# Patient Record
Sex: FEMALE | Race: WHITE | NOT HISPANIC OR LATINO | Employment: FULL TIME | URBAN - METROPOLITAN AREA
[De-identification: names, ages, dates, MRNs, and addresses within clinical notes are randomized per-mention and may not be internally consistent; named-entity substitution may affect disease eponyms.]

---

## 2017-01-03 ENCOUNTER — GENERIC CONVERSION - ENCOUNTER (OUTPATIENT)
Dept: OTHER | Facility: OTHER | Age: 32
End: 2017-01-03

## 2017-01-06 ENCOUNTER — ALLSCRIPTS OFFICE VISIT (OUTPATIENT)
Dept: OTHER | Facility: OTHER | Age: 32
End: 2017-01-06

## 2017-01-06 ENCOUNTER — GENERIC CONVERSION - ENCOUNTER (OUTPATIENT)
Dept: OTHER | Facility: OTHER | Age: 32
End: 2017-01-06

## 2017-01-06 LAB
GLUCOSE (HISTORICAL): NEGATIVE
PROT UR STRIP-MCNC: NEGATIVE MG/DL

## 2017-01-10 ENCOUNTER — GENERIC CONVERSION - ENCOUNTER (OUTPATIENT)
Dept: OTHER | Facility: OTHER | Age: 32
End: 2017-01-10

## 2017-01-12 ENCOUNTER — GENERIC CONVERSION - ENCOUNTER (OUTPATIENT)
Dept: OTHER | Facility: OTHER | Age: 32
End: 2017-01-12

## 2017-01-13 ENCOUNTER — GENERIC CONVERSION - ENCOUNTER (OUTPATIENT)
Dept: OTHER | Facility: OTHER | Age: 32
End: 2017-01-13

## 2017-01-14 ENCOUNTER — TRANSCRIBE ORDERS (OUTPATIENT)
Dept: LAB | Facility: CLINIC | Age: 32
End: 2017-01-14

## 2017-01-14 ENCOUNTER — APPOINTMENT (OUTPATIENT)
Dept: LAB | Facility: CLINIC | Age: 32
End: 2017-01-14
Payer: COMMERCIAL

## 2017-01-14 DIAGNOSIS — Z34.90 PREGNANCY, UNSPECIFIED GESTATIONAL AGE: ICD-10-CM

## 2017-01-14 DIAGNOSIS — Z34.90 PREGNANCY, UNSPECIFIED GESTATIONAL AGE: Primary | ICD-10-CM

## 2017-01-14 PROCEDURE — 36415 COLL VENOUS BLD VENIPUNCTURE: CPT

## 2017-01-15 LAB — SCAN RESULT: NORMAL

## 2017-01-17 ENCOUNTER — TRANSCRIBE ORDERS (OUTPATIENT)
Dept: URGENT CARE | Facility: MEDICAL CENTER | Age: 32
End: 2017-01-17

## 2017-01-17 ENCOUNTER — APPOINTMENT (OUTPATIENT)
Dept: URGENT CARE | Facility: MEDICAL CENTER | Age: 32
End: 2017-01-17
Payer: COMMERCIAL

## 2017-01-17 ENCOUNTER — OFFICE VISIT (OUTPATIENT)
Dept: URGENT CARE | Facility: MEDICAL CENTER | Age: 32
End: 2017-01-17
Payer: COMMERCIAL

## 2017-01-17 ENCOUNTER — APPOINTMENT (OUTPATIENT)
Dept: LAB | Facility: MEDICAL CENTER | Age: 32
End: 2017-01-17
Payer: COMMERCIAL

## 2017-01-17 ENCOUNTER — GENERIC CONVERSION - ENCOUNTER (OUTPATIENT)
Dept: OTHER | Facility: OTHER | Age: 32
End: 2017-01-17

## 2017-01-17 DIAGNOSIS — J02.9 SORE THROAT: ICD-10-CM

## 2017-01-17 DIAGNOSIS — J02.9 SORE THROAT: Primary | ICD-10-CM

## 2017-01-17 DIAGNOSIS — J02.9 ACUTE PHARYNGITIS: ICD-10-CM

## 2017-01-17 PROCEDURE — 99213 OFFICE O/P EST LOW 20 MIN: CPT

## 2017-01-17 PROCEDURE — 87070 CULTURE OTHR SPECIMN AEROBIC: CPT

## 2017-01-19 ENCOUNTER — APPOINTMENT (OUTPATIENT)
Dept: PERINATAL CARE | Facility: CLINIC | Age: 32
End: 2017-01-19
Payer: COMMERCIAL

## 2017-01-19 ENCOUNTER — GENERIC CONVERSION - ENCOUNTER (OUTPATIENT)
Dept: OTHER | Facility: OTHER | Age: 32
End: 2017-01-19

## 2017-01-19 LAB — BACTERIA THROAT CULT: NORMAL

## 2017-01-19 PROCEDURE — 96372 THER/PROPH/DIAG INJ SC/IM: CPT | Performed by: OBSTETRICS & GYNECOLOGY

## 2017-01-25 ENCOUNTER — GENERIC CONVERSION - ENCOUNTER (OUTPATIENT)
Dept: OTHER | Facility: OTHER | Age: 32
End: 2017-01-25

## 2017-01-26 ENCOUNTER — ALLSCRIPTS OFFICE VISIT (OUTPATIENT)
Dept: PERINATAL CARE | Facility: CLINIC | Age: 32
End: 2017-01-26
Payer: COMMERCIAL

## 2017-01-26 PROCEDURE — 96372 THER/PROPH/DIAG INJ SC/IM: CPT | Performed by: OBSTETRICS & GYNECOLOGY

## 2017-02-02 ENCOUNTER — APPOINTMENT (OUTPATIENT)
Dept: PERINATAL CARE | Facility: CLINIC | Age: 32
End: 2017-02-02
Payer: COMMERCIAL

## 2017-02-02 ENCOUNTER — GENERIC CONVERSION - ENCOUNTER (OUTPATIENT)
Dept: OTHER | Facility: OTHER | Age: 32
End: 2017-02-02

## 2017-02-02 PROCEDURE — 96372 THER/PROPH/DIAG INJ SC/IM: CPT | Performed by: OBSTETRICS & GYNECOLOGY

## 2017-02-03 ENCOUNTER — ALLSCRIPTS OFFICE VISIT (OUTPATIENT)
Dept: PERINATAL CARE | Facility: MEDICAL CENTER | Age: 32
End: 2017-02-03
Payer: COMMERCIAL

## 2017-02-03 ENCOUNTER — GENERIC CONVERSION - ENCOUNTER (OUTPATIENT)
Dept: OTHER | Facility: OTHER | Age: 32
End: 2017-02-03

## 2017-02-03 PROCEDURE — 76811 OB US DETAILED SNGL FETUS: CPT | Performed by: OBSTETRICS & GYNECOLOGY

## 2017-02-03 PROCEDURE — 76817 TRANSVAGINAL US OBSTETRIC: CPT | Performed by: OBSTETRICS & GYNECOLOGY

## 2017-02-06 ENCOUNTER — ALLSCRIPTS OFFICE VISIT (OUTPATIENT)
Dept: OTHER | Facility: OTHER | Age: 32
End: 2017-02-06

## 2017-02-06 LAB
GLUCOSE (HISTORICAL): NEGATIVE
PROT UR STRIP-MCNC: NEGATIVE MG/DL

## 2017-02-09 ENCOUNTER — ALLSCRIPTS OFFICE VISIT (OUTPATIENT)
Dept: PERINATAL CARE | Facility: CLINIC | Age: 32
End: 2017-02-09
Payer: COMMERCIAL

## 2017-02-09 PROCEDURE — 96372 THER/PROPH/DIAG INJ SC/IM: CPT | Performed by: OBSTETRICS & GYNECOLOGY

## 2017-02-16 ENCOUNTER — ALLSCRIPTS OFFICE VISIT (OUTPATIENT)
Dept: PERINATAL CARE | Facility: CLINIC | Age: 32
End: 2017-02-16
Payer: COMMERCIAL

## 2017-02-16 ENCOUNTER — GENERIC CONVERSION - ENCOUNTER (OUTPATIENT)
Dept: OTHER | Facility: OTHER | Age: 32
End: 2017-02-16

## 2017-02-16 PROCEDURE — 76815 OB US LIMITED FETUS(S): CPT | Performed by: OBSTETRICS & GYNECOLOGY

## 2017-02-16 PROCEDURE — 96372 THER/PROPH/DIAG INJ SC/IM: CPT | Performed by: OBSTETRICS & GYNECOLOGY

## 2017-02-16 PROCEDURE — 76816 OB US FOLLOW-UP PER FETUS: CPT | Performed by: OBSTETRICS & GYNECOLOGY

## 2017-02-23 ENCOUNTER — GENERIC CONVERSION - ENCOUNTER (OUTPATIENT)
Dept: OTHER | Facility: OTHER | Age: 32
End: 2017-02-23

## 2017-02-23 ENCOUNTER — APPOINTMENT (OUTPATIENT)
Dept: PERINATAL CARE | Facility: CLINIC | Age: 32
End: 2017-02-23
Payer: COMMERCIAL

## 2017-02-23 PROCEDURE — 96372 THER/PROPH/DIAG INJ SC/IM: CPT | Performed by: OBSTETRICS & GYNECOLOGY

## 2017-03-02 ENCOUNTER — GENERIC CONVERSION - ENCOUNTER (OUTPATIENT)
Dept: OTHER | Facility: OTHER | Age: 32
End: 2017-03-02

## 2017-03-02 ENCOUNTER — ALLSCRIPTS OFFICE VISIT (OUTPATIENT)
Dept: PERINATAL CARE | Facility: CLINIC | Age: 32
End: 2017-03-02
Payer: COMMERCIAL

## 2017-03-02 PROCEDURE — 76817 TRANSVAGINAL US OBSTETRIC: CPT | Performed by: OBSTETRICS & GYNECOLOGY

## 2017-03-08 ENCOUNTER — GENERIC CONVERSION - ENCOUNTER (OUTPATIENT)
Dept: OTHER | Facility: OTHER | Age: 32
End: 2017-03-08

## 2017-03-09 ENCOUNTER — GENERIC CONVERSION - ENCOUNTER (OUTPATIENT)
Dept: OTHER | Facility: OTHER | Age: 32
End: 2017-03-09

## 2017-03-09 ENCOUNTER — APPOINTMENT (OUTPATIENT)
Dept: PERINATAL CARE | Facility: CLINIC | Age: 32
End: 2017-03-09
Payer: COMMERCIAL

## 2017-03-09 PROCEDURE — 96372 THER/PROPH/DIAG INJ SC/IM: CPT | Performed by: OBSTETRICS & GYNECOLOGY

## 2017-03-15 ENCOUNTER — GENERIC CONVERSION - ENCOUNTER (OUTPATIENT)
Dept: OTHER | Facility: OTHER | Age: 32
End: 2017-03-15

## 2017-03-16 ENCOUNTER — ALLSCRIPTS OFFICE VISIT (OUTPATIENT)
Dept: PERINATAL CARE | Facility: CLINIC | Age: 32
End: 2017-03-16
Payer: COMMERCIAL

## 2017-03-16 PROCEDURE — 96372 THER/PROPH/DIAG INJ SC/IM: CPT | Performed by: OBSTETRICS & GYNECOLOGY

## 2017-03-23 ENCOUNTER — ALLSCRIPTS OFFICE VISIT (OUTPATIENT)
Dept: PERINATAL CARE | Facility: CLINIC | Age: 32
End: 2017-03-23
Payer: COMMERCIAL

## 2017-03-23 PROCEDURE — 96372 THER/PROPH/DIAG INJ SC/IM: CPT | Performed by: OBSTETRICS & GYNECOLOGY

## 2017-03-30 ENCOUNTER — GENERIC CONVERSION - ENCOUNTER (OUTPATIENT)
Dept: OTHER | Facility: OTHER | Age: 32
End: 2017-03-30

## 2017-03-30 ENCOUNTER — ALLSCRIPTS OFFICE VISIT (OUTPATIENT)
Dept: PERINATAL CARE | Facility: CLINIC | Age: 32
End: 2017-03-30
Payer: COMMERCIAL

## 2017-03-30 PROCEDURE — 76816 OB US FOLLOW-UP PER FETUS: CPT | Performed by: OBSTETRICS & GYNECOLOGY

## 2017-04-05 ENCOUNTER — GENERIC CONVERSION - ENCOUNTER (OUTPATIENT)
Dept: OTHER | Facility: OTHER | Age: 32
End: 2017-04-05

## 2017-04-05 DIAGNOSIS — Z34.82 ENCOUNTER FOR SUPERVISION OF OTHER NORMAL PREGNANCY, SECOND TRIMESTER: ICD-10-CM

## 2017-04-06 ENCOUNTER — APPOINTMENT (OUTPATIENT)
Dept: PERINATAL CARE | Facility: CLINIC | Age: 32
End: 2017-04-06
Payer: COMMERCIAL

## 2017-04-06 ENCOUNTER — GENERIC CONVERSION - ENCOUNTER (OUTPATIENT)
Dept: OTHER | Facility: OTHER | Age: 32
End: 2017-04-06

## 2017-04-06 PROCEDURE — 96372 THER/PROPH/DIAG INJ SC/IM: CPT | Performed by: OBSTETRICS & GYNECOLOGY

## 2017-04-08 ENCOUNTER — APPOINTMENT (OUTPATIENT)
Dept: LAB | Facility: CLINIC | Age: 32
End: 2017-04-08
Payer: COMMERCIAL

## 2017-04-08 ENCOUNTER — TRANSCRIBE ORDERS (OUTPATIENT)
Dept: LAB | Facility: CLINIC | Age: 32
End: 2017-04-08

## 2017-04-08 DIAGNOSIS — Z34.82 ENCOUNTER FOR SUPERVISION OF OTHER NORMAL PREGNANCY, SECOND TRIMESTER: ICD-10-CM

## 2017-04-08 LAB
BASOPHILS # BLD AUTO: 0.02 THOUSANDS/ΜL (ref 0–0.1)
BASOPHILS NFR BLD AUTO: 0 % (ref 0–1)
EOSINOPHIL # BLD AUTO: 1.09 THOUSAND/ΜL (ref 0–0.61)
EOSINOPHIL NFR BLD AUTO: 9 % (ref 0–6)
ERYTHROCYTE [DISTWIDTH] IN BLOOD BY AUTOMATED COUNT: 12.4 % (ref 11.6–15.1)
GLUCOSE 1H P 50 G GLC PO SERPL-MCNC: 98 MG/DL
HCT VFR BLD AUTO: 34.6 % (ref 34.8–46.1)
HGB BLD-MCNC: 11.8 G/DL (ref 11.5–15.4)
LYMPHOCYTES # BLD AUTO: 2.01 THOUSANDS/ΜL (ref 0.6–4.47)
LYMPHOCYTES NFR BLD AUTO: 17 % (ref 14–44)
MCH RBC QN AUTO: 29.9 PG (ref 26.8–34.3)
MCHC RBC AUTO-ENTMCNC: 34.1 G/DL (ref 31.4–37.4)
MCV RBC AUTO: 88 FL (ref 82–98)
MONOCYTES # BLD AUTO: 0.59 THOUSAND/ΜL (ref 0.17–1.22)
MONOCYTES NFR BLD AUTO: 5 % (ref 4–12)
NEUTROPHILS # BLD AUTO: 8.21 THOUSANDS/ΜL (ref 1.85–7.62)
NEUTS SEG NFR BLD AUTO: 69 % (ref 43–75)
PLATELET # BLD AUTO: 214 THOUSANDS/UL (ref 149–390)
PMV BLD AUTO: 10 FL (ref 8.9–12.7)
RBC # BLD AUTO: 3.95 MILLION/UL (ref 3.81–5.12)
WBC # BLD AUTO: 11.92 THOUSAND/UL (ref 4.31–10.16)

## 2017-04-08 PROCEDURE — 85025 COMPLETE CBC W/AUTO DIFF WBC: CPT

## 2017-04-08 PROCEDURE — 36415 COLL VENOUS BLD VENIPUNCTURE: CPT

## 2017-04-08 PROCEDURE — 82950 GLUCOSE TEST: CPT

## 2017-04-13 ENCOUNTER — APPOINTMENT (OUTPATIENT)
Dept: PERINATAL CARE | Facility: CLINIC | Age: 32
End: 2017-04-13
Payer: COMMERCIAL

## 2017-04-13 ENCOUNTER — GENERIC CONVERSION - ENCOUNTER (OUTPATIENT)
Dept: OTHER | Facility: OTHER | Age: 32
End: 2017-04-13

## 2017-04-13 PROCEDURE — 96372 THER/PROPH/DIAG INJ SC/IM: CPT | Performed by: OBSTETRICS & GYNECOLOGY

## 2017-04-18 ENCOUNTER — ALLSCRIPTS OFFICE VISIT (OUTPATIENT)
Dept: OTHER | Facility: OTHER | Age: 32
End: 2017-04-18

## 2017-04-18 ENCOUNTER — GENERIC CONVERSION - ENCOUNTER (OUTPATIENT)
Dept: OTHER | Facility: OTHER | Age: 32
End: 2017-04-18

## 2017-04-20 ENCOUNTER — ALLSCRIPTS OFFICE VISIT (OUTPATIENT)
Dept: PERINATAL CARE | Facility: CLINIC | Age: 32
End: 2017-04-20
Payer: COMMERCIAL

## 2017-04-20 PROCEDURE — 96372 THER/PROPH/DIAG INJ SC/IM: CPT | Performed by: OBSTETRICS & GYNECOLOGY

## 2017-04-27 ENCOUNTER — ALLSCRIPTS OFFICE VISIT (OUTPATIENT)
Dept: PERINATAL CARE | Facility: CLINIC | Age: 32
End: 2017-04-27
Payer: COMMERCIAL

## 2017-04-27 PROCEDURE — 96372 THER/PROPH/DIAG INJ SC/IM: CPT | Performed by: OBSTETRICS & GYNECOLOGY

## 2017-04-28 ENCOUNTER — GENERIC CONVERSION - ENCOUNTER (OUTPATIENT)
Dept: OBGYN CLINIC | Facility: CLINIC | Age: 32
End: 2017-04-28

## 2017-05-05 ENCOUNTER — GENERIC CONVERSION - ENCOUNTER (OUTPATIENT)
Dept: OTHER | Facility: OTHER | Age: 32
End: 2017-05-05

## 2017-05-05 ENCOUNTER — ALLSCRIPTS OFFICE VISIT (OUTPATIENT)
Dept: PERINATAL CARE | Facility: CLINIC | Age: 32
End: 2017-05-05
Payer: COMMERCIAL

## 2017-05-05 PROCEDURE — 96372 THER/PROPH/DIAG INJ SC/IM: CPT | Performed by: OBSTETRICS & GYNECOLOGY

## 2017-05-05 PROCEDURE — 76816 OB US FOLLOW-UP PER FETUS: CPT | Performed by: OBSTETRICS & GYNECOLOGY

## 2017-05-11 ENCOUNTER — APPOINTMENT (OUTPATIENT)
Dept: PERINATAL CARE | Facility: CLINIC | Age: 32
End: 2017-05-11
Payer: COMMERCIAL

## 2017-05-11 ENCOUNTER — GENERIC CONVERSION - ENCOUNTER (OUTPATIENT)
Dept: OTHER | Facility: OTHER | Age: 32
End: 2017-05-11

## 2017-05-11 PROCEDURE — 96372 THER/PROPH/DIAG INJ SC/IM: CPT | Performed by: OBSTETRICS & GYNECOLOGY

## 2017-05-12 ENCOUNTER — GENERIC CONVERSION - ENCOUNTER (OUTPATIENT)
Dept: OTHER | Facility: OTHER | Age: 32
End: 2017-05-12

## 2017-05-18 ENCOUNTER — APPOINTMENT (OUTPATIENT)
Dept: PERINATAL CARE | Facility: CLINIC | Age: 32
End: 2017-05-18
Payer: COMMERCIAL

## 2017-05-18 ENCOUNTER — GENERIC CONVERSION - ENCOUNTER (OUTPATIENT)
Dept: OTHER | Facility: OTHER | Age: 32
End: 2017-05-18

## 2017-05-18 PROCEDURE — 96372 THER/PROPH/DIAG INJ SC/IM: CPT | Performed by: OBSTETRICS & GYNECOLOGY

## 2017-05-25 ENCOUNTER — APPOINTMENT (OUTPATIENT)
Dept: PERINATAL CARE | Facility: CLINIC | Age: 32
End: 2017-05-25
Payer: COMMERCIAL

## 2017-05-25 ENCOUNTER — GENERIC CONVERSION - ENCOUNTER (OUTPATIENT)
Dept: OTHER | Facility: OTHER | Age: 32
End: 2017-05-25

## 2017-05-25 PROCEDURE — 96372 THER/PROPH/DIAG INJ SC/IM: CPT | Performed by: OBSTETRICS & GYNECOLOGY

## 2017-05-26 ENCOUNTER — ALLSCRIPTS OFFICE VISIT (OUTPATIENT)
Dept: OTHER | Facility: OTHER | Age: 32
End: 2017-05-26

## 2017-05-29 LAB — CULT.,B-STREP SENSI PENICILLIN (HISTORICAL): NEGATIVE

## 2017-06-01 ENCOUNTER — GENERIC CONVERSION - ENCOUNTER (OUTPATIENT)
Dept: OTHER | Facility: OTHER | Age: 32
End: 2017-06-01

## 2017-06-01 ENCOUNTER — APPOINTMENT (OUTPATIENT)
Dept: PERINATAL CARE | Facility: CLINIC | Age: 32
End: 2017-06-01
Payer: COMMERCIAL

## 2017-06-01 PROCEDURE — 96372 THER/PROPH/DIAG INJ SC/IM: CPT | Performed by: OBSTETRICS & GYNECOLOGY

## 2017-06-02 ENCOUNTER — GENERIC CONVERSION - ENCOUNTER (OUTPATIENT)
Dept: OTHER | Facility: OTHER | Age: 32
End: 2017-06-02

## 2017-06-09 ENCOUNTER — GENERIC CONVERSION - ENCOUNTER (OUTPATIENT)
Dept: OBGYN CLINIC | Facility: CLINIC | Age: 32
End: 2017-06-09

## 2017-06-16 ENCOUNTER — GENERIC CONVERSION - ENCOUNTER (OUTPATIENT)
Dept: OTHER | Facility: OTHER | Age: 32
End: 2017-06-16

## 2017-06-23 ENCOUNTER — GENERIC CONVERSION - ENCOUNTER (OUTPATIENT)
Dept: OBGYN CLINIC | Facility: CLINIC | Age: 32
End: 2017-06-23

## 2017-06-29 ENCOUNTER — HOSPITAL ENCOUNTER (OUTPATIENT)
Dept: LABOR AND DELIVERY | Facility: HOSPITAL | Age: 32
Discharge: HOME | End: 2017-06-29
Payer: COMMERCIAL

## 2017-06-29 ENCOUNTER — ANESTHESIA EVENT (INPATIENT)
Dept: LABOR AND DELIVERY | Facility: HOSPITAL | Age: 32
End: 2017-06-29
Payer: COMMERCIAL

## 2017-06-29 ENCOUNTER — ANESTHESIA (INPATIENT)
Dept: LABOR AND DELIVERY | Facility: HOSPITAL | Age: 32
End: 2017-06-29
Payer: COMMERCIAL

## 2017-06-29 ENCOUNTER — HOSPITAL ENCOUNTER (INPATIENT)
Facility: HOSPITAL | Age: 32
LOS: 2 days | Discharge: HOME | End: 2017-07-01
Attending: OBSTETRICS & GYNECOLOGY | Admitting: OBSTETRICS & GYNECOLOGY
Payer: COMMERCIAL

## 2017-06-29 DIAGNOSIS — Z3A.40 40 WEEKS GESTATION OF PREGNANCY: ICD-10-CM

## 2017-06-29 PROBLEM — J45.909 ASTHMA: Status: ACTIVE | Noted: 2017-06-29

## 2017-06-29 PROBLEM — F41.9 ANXIETY DISORDER: Status: ACTIVE | Noted: 2017-06-29

## 2017-06-29 LAB
ABO GROUP BLD: NORMAL
BASE EXCESS BLDCOA CALC-SCNC: -3.4 MMOL/L (ref 3–11)
BASE EXCESS BLDCOV CALC-SCNC: -1 MMOL/L (ref 1–9)
BASOPHILS # BLD AUTO: 0.03 THOUSANDS/ΜL (ref 0–0.1)
BASOPHILS NFR BLD AUTO: 0 % (ref 0–1)
BLD GP AB SCN SERPL QL: NEGATIVE
EOSINOPHIL # BLD AUTO: 0.09 THOUSAND/ΜL (ref 0–0.61)
EOSINOPHIL NFR BLD AUTO: 1 % (ref 0–6)
ERYTHROCYTE [DISTWIDTH] IN BLOOD BY AUTOMATED COUNT: 13.3 % (ref 11.6–15.1)
HCO3 BLDCOA-SCNC: 25.8 MMOL/L (ref 17.3–27.3)
HCO3 BLDCOV-SCNC: 24.8 MMOL/L (ref 12.2–28.6)
HCT VFR BLD AUTO: 35.5 % (ref 34.8–46.1)
HGB BLD-MCNC: 11.8 G/DL (ref 11.5–15.4)
LYMPHOCYTES # BLD AUTO: 1.76 THOUSANDS/ΜL (ref 0.6–4.47)
LYMPHOCYTES NFR BLD AUTO: 20 % (ref 14–44)
MCH RBC QN AUTO: 28.2 PG (ref 26.8–34.3)
MCHC RBC AUTO-ENTMCNC: 33.2 G/DL (ref 31.4–37.4)
MCV RBC AUTO: 85 FL (ref 82–98)
MONOCYTES # BLD AUTO: 0.5 THOUSAND/ΜL (ref 0.17–1.22)
MONOCYTES NFR BLD AUTO: 6 % (ref 4–12)
NEUTROPHILS # BLD AUTO: 6.55 THOUSANDS/ΜL (ref 1.85–7.62)
NEUTS SEG NFR BLD AUTO: 73 % (ref 43–75)
NRBC BLD AUTO-RTO: 0 /100 WBCS
O2 CT VFR BLDCOA CALC: 9.5 ML/DL
OXYHGB MFR BLDCOA: 37.5 %
OXYHGB MFR BLDCOV: 52.4 %
PCO2 BLDCOA: 62.5 MM[HG] (ref 30–60)
PCO2 BLDCOV: 44.9 MM HG (ref 27–43)
PH BLDCOA: 7.23 [PH] (ref 7.23–7.43)
PH BLDCOV: 7.36 [PH] (ref 7.19–7.49)
PLATELET # BLD AUTO: 180 THOUSANDS/UL (ref 149–390)
PMV BLD AUTO: 12.6 FL (ref 8.9–12.7)
PO2 BLDCOA: 21.9 MM HG (ref 5–25)
PO2 BLDCOV: 23.6 MM HG (ref 15–45)
RBC # BLD AUTO: 4.19 MILLION/UL (ref 3.81–5.12)
RH BLD: POSITIVE
SAO2 % BLDCOV: 13.1 ML/DL
SPECIMEN EXPIRATION DATE: NORMAL
WBC # BLD AUTO: 8.97 THOUSAND/UL (ref 4.31–10.16)

## 2017-06-29 PROCEDURE — 86900 BLOOD TYPING SEROLOGIC ABO: CPT | Performed by: OBSTETRICS & GYNECOLOGY

## 2017-06-29 PROCEDURE — 82805 BLOOD GASES W/O2 SATURATION: CPT | Performed by: OBSTETRICS & GYNECOLOGY

## 2017-06-29 PROCEDURE — 10D17ZZ EXTRACTION OF PRODUCTS OF CONCEPTION, RETAINED, VIA NATURAL OR ARTIFICIAL OPENING: ICD-10-PCS | Performed by: OBSTETRICS & GYNECOLOGY

## 2017-06-29 PROCEDURE — 86850 RBC ANTIBODY SCREEN: CPT | Performed by: OBSTETRICS & GYNECOLOGY

## 2017-06-29 PROCEDURE — 10907ZC DRAINAGE OF AMNIOTIC FLUID, THERAPEUTIC FROM PRODUCTS OF CONCEPTION, VIA NATURAL OR ARTIFICIAL OPENING: ICD-10-PCS | Performed by: OBSTETRICS & GYNECOLOGY

## 2017-06-29 PROCEDURE — 85025 COMPLETE CBC W/AUTO DIFF WBC: CPT | Performed by: OBSTETRICS & GYNECOLOGY

## 2017-06-29 PROCEDURE — 86901 BLOOD TYPING SEROLOGIC RH(D): CPT | Performed by: OBSTETRICS & GYNECOLOGY

## 2017-06-29 PROCEDURE — 88305 TISSUE EXAM BY PATHOLOGIST: CPT | Performed by: OBSTETRICS & GYNECOLOGY

## 2017-06-29 PROCEDURE — 3E033VJ INTRODUCTION OF OTHER HORMONE INTO PERIPHERAL VEIN, PERCUTANEOUS APPROACH: ICD-10-PCS | Performed by: OBSTETRICS & GYNECOLOGY

## 2017-06-29 PROCEDURE — 86592 SYPHILIS TEST NON-TREP QUAL: CPT | Performed by: OBSTETRICS & GYNECOLOGY

## 2017-06-29 RX ORDER — OXYTOCIN/RINGER'S LACTATE 30/500 ML
1-30 PLASTIC BAG, INJECTION (ML) INTRAVENOUS
Status: DISCONTINUED | OUTPATIENT
Start: 2017-06-29 | End: 2017-07-01 | Stop reason: HOSPADM

## 2017-06-29 RX ORDER — DOCUSATE SODIUM 100 MG/1
100 CAPSULE, LIQUID FILLED ORAL 2 TIMES DAILY
Status: DISCONTINUED | OUTPATIENT
Start: 2017-06-29 | End: 2017-07-01 | Stop reason: HOSPADM

## 2017-06-29 RX ORDER — ACETAMINOPHEN 325 MG/1
650 TABLET ORAL EVERY 6 HOURS PRN
Status: DISCONTINUED | OUTPATIENT
Start: 2017-06-29 | End: 2017-07-01 | Stop reason: HOSPADM

## 2017-06-29 RX ORDER — BISACODYL 10 MG
10 SUPPOSITORY, RECTAL RECTAL AS NEEDED
Status: DISCONTINUED | OUTPATIENT
Start: 2017-06-29 | End: 2017-07-01 | Stop reason: HOSPADM

## 2017-06-29 RX ORDER — LIDOCAINE HYDROCHLORIDE AND EPINEPHRINE 15; 5 MG/ML; UG/ML
INJECTION, SOLUTION EPIDURAL AS NEEDED
Status: DISCONTINUED | OUTPATIENT
Start: 2017-06-29 | End: 2017-06-29 | Stop reason: SURG

## 2017-06-29 RX ORDER — SENNOSIDES 8.6 MG
1 TABLET ORAL DAILY
Status: DISCONTINUED | OUTPATIENT
Start: 2017-06-30 | End: 2017-07-01 | Stop reason: HOSPADM

## 2017-06-29 RX ORDER — DIPHENHYDRAMINE HCL 25 MG
25 TABLET ORAL EVERY 6 HOURS PRN
Status: DISCONTINUED | OUTPATIENT
Start: 2017-06-29 | End: 2017-07-01 | Stop reason: HOSPADM

## 2017-06-29 RX ORDER — SIMETHICONE 80 MG
80 TABLET,CHEWABLE ORAL 4 TIMES DAILY PRN
Status: DISCONTINUED | OUTPATIENT
Start: 2017-06-29 | End: 2017-07-01 | Stop reason: HOSPADM

## 2017-06-29 RX ORDER — METOCLOPRAMIDE HYDROCHLORIDE 5 MG/ML
10 INJECTION INTRAMUSCULAR; INTRAVENOUS EVERY 6 HOURS PRN
Status: DISCONTINUED | OUTPATIENT
Start: 2017-06-29 | End: 2017-07-01 | Stop reason: HOSPADM

## 2017-06-29 RX ORDER — OXYCODONE HYDROCHLORIDE AND ACETAMINOPHEN 5; 325 MG/1; MG/1
1 TABLET ORAL EVERY 4 HOURS PRN
Status: DISCONTINUED | OUTPATIENT
Start: 2017-06-29 | End: 2017-07-01 | Stop reason: HOSPADM

## 2017-06-29 RX ORDER — OXYCODONE HYDROCHLORIDE AND ACETAMINOPHEN 5; 325 MG/1; MG/1
2 TABLET ORAL EVERY 4 HOURS PRN
Status: DISCONTINUED | OUTPATIENT
Start: 2017-06-29 | End: 2017-07-01 | Stop reason: HOSPADM

## 2017-06-29 RX ORDER — DIAPER,BRIEF,INFANT-TODD,DISP
1 EACH MISCELLANEOUS AS NEEDED
Status: DISCONTINUED | OUTPATIENT
Start: 2017-06-29 | End: 2017-07-01 | Stop reason: HOSPADM

## 2017-06-29 RX ORDER — ROPIVACAINE HYDROCHLORIDE 2 MG/ML
INJECTION, SOLUTION EPIDURAL; INFILTRATION; PERINEURAL AS NEEDED
Status: DISCONTINUED | OUTPATIENT
Start: 2017-06-29 | End: 2017-06-29 | Stop reason: SURG

## 2017-06-29 RX ORDER — OXYTOCIN/RINGER'S LACTATE 30/500 ML
250 PLASTIC BAG, INJECTION (ML) INTRAVENOUS CONTINUOUS
Status: DISPENSED | OUTPATIENT
Start: 2017-06-29 | End: 2017-06-29

## 2017-06-29 RX ORDER — SODIUM CHLORIDE, SODIUM LACTATE, POTASSIUM CHLORIDE, CALCIUM CHLORIDE 600; 310; 30; 20 MG/100ML; MG/100ML; MG/100ML; MG/100ML
125 INJECTION, SOLUTION INTRAVENOUS CONTINUOUS
Status: DISCONTINUED | OUTPATIENT
Start: 2017-06-29 | End: 2017-07-01 | Stop reason: HOSPADM

## 2017-06-29 RX ORDER — IBUPROFEN 600 MG/1
600 TABLET ORAL EVERY 6 HOURS PRN
Status: DISCONTINUED | OUTPATIENT
Start: 2017-06-29 | End: 2017-07-01 | Stop reason: HOSPADM

## 2017-06-29 RX ORDER — IBUPROFEN 600 MG/1
TABLET ORAL
Status: COMPLETED
Start: 2017-06-29 | End: 2017-06-29

## 2017-06-29 RX ORDER — OXYTOCIN/RINGER'S LACTATE 30/500 ML
250 PLASTIC BAG, INJECTION (ML) INTRAVENOUS CONTINUOUS
Status: ACTIVE | OUTPATIENT
Start: 2017-06-29 | End: 2017-06-29

## 2017-06-29 RX ORDER — ONDANSETRON 2 MG/ML
4 INJECTION INTRAMUSCULAR; INTRAVENOUS EVERY 6 HOURS PRN
Status: DISCONTINUED | OUTPATIENT
Start: 2017-06-29 | End: 2017-07-01 | Stop reason: HOSPADM

## 2017-06-29 RX ADMIN — SODIUM CHLORIDE, SODIUM LACTATE, POTASSIUM CHLORIDE, AND CALCIUM CHLORIDE 999 ML/HR: .6; .31; .03; .02 INJECTION, SOLUTION INTRAVENOUS at 12:15

## 2017-06-29 RX ADMIN — ROPIVACAINE HYDROCHLORIDE: 2 INJECTION, SOLUTION EPIDURAL; INFILTRATION at 16:05

## 2017-06-29 RX ADMIN — IBUPROFEN 600 MG: 600 TABLET, FILM COATED ORAL at 19:22

## 2017-06-29 RX ADMIN — ROPIVACAINE HYDROCHLORIDE 10 ML: 2 INJECTION, SOLUTION EPIDURAL; INFILTRATION at 16:02

## 2017-06-29 RX ADMIN — ONDANSETRON 4 MG: 2 INJECTION INTRAMUSCULAR; INTRAVENOUS at 16:32

## 2017-06-29 RX ADMIN — LIDOCAINE HYDROCHLORIDE AND EPINEPHRINE 3 ML: 15; 5 INJECTION, SOLUTION EPIDURAL at 15:57

## 2017-06-29 RX ADMIN — SODIUM CHLORIDE, SODIUM LACTATE, POTASSIUM CHLORIDE, AND CALCIUM CHLORIDE 125 ML/HR: .6; .31; .03; .02 INJECTION, SOLUTION INTRAVENOUS at 16:26

## 2017-06-29 RX ADMIN — SODIUM CHLORIDE, SODIUM LACTATE, POTASSIUM CHLORIDE, AND CALCIUM CHLORIDE 125 ML/HR: .6; .31; .03; .02 INJECTION, SOLUTION INTRAVENOUS at 13:04

## 2017-06-29 RX ADMIN — Medication 2 MILLI-UNITS/MIN: at 12:16

## 2017-06-29 RX ADMIN — Medication 250 MILLI-UNITS/MIN: at 20:39

## 2017-06-30 LAB
EXTERNAL GROUP B STREP ANTIGEN: NEGATIVE
RPR SER QL: NORMAL

## 2017-06-30 RX ADMIN — BENZOCAINE AND MENTHOL 1 APPLICATION: 20; .5 SPRAY TOPICAL at 08:35

## 2017-06-30 RX ADMIN — IBUPROFEN 600 MG: 600 TABLET, FILM COATED ORAL at 05:46

## 2017-06-30 RX ADMIN — IBUPROFEN 600 MG: 600 TABLET, FILM COATED ORAL at 18:20

## 2017-06-30 RX ADMIN — WITCH HAZEL 1 PAD: 500 SOLUTION RECTAL; TOPICAL at 08:35

## 2017-06-30 RX ADMIN — DOCUSATE SODIUM 100 MG: 100 CAPSULE, LIQUID FILLED ORAL at 08:35

## 2017-06-30 RX ADMIN — IBUPROFEN 600 MG: 600 TABLET, FILM COATED ORAL at 12:39

## 2017-07-01 VITALS
DIASTOLIC BLOOD PRESSURE: 71 MMHG | RESPIRATION RATE: 18 BRPM | HEART RATE: 67 BPM | TEMPERATURE: 98.1 F | OXYGEN SATURATION: 100 % | SYSTOLIC BLOOD PRESSURE: 128 MMHG

## 2017-07-01 RX ORDER — IBUPROFEN 600 MG/1
600 TABLET ORAL EVERY 6 HOURS PRN
Qty: 30 TABLET | Refills: 0 | Status: SHIPPED | OUTPATIENT
Start: 2017-07-01 | End: 2019-11-07

## 2017-07-01 RX ORDER — DOCUSATE SODIUM 100 MG/1
100 CAPSULE, LIQUID FILLED ORAL 2 TIMES DAILY
Qty: 10 CAPSULE | Refills: 0 | Status: SHIPPED | OUTPATIENT
Start: 2017-07-01 | End: 2019-11-07

## 2017-07-01 RX ADMIN — IBUPROFEN 600 MG: 600 TABLET, FILM COATED ORAL at 09:53

## 2017-07-01 RX ADMIN — DOCUSATE SODIUM 100 MG: 100 CAPSULE, LIQUID FILLED ORAL at 09:53

## 2017-07-07 LAB — PLACENTA IN STORAGE: NORMAL

## 2017-07-28 ENCOUNTER — GENERIC CONVERSION - ENCOUNTER (OUTPATIENT)
Dept: OTHER | Facility: OTHER | Age: 32
End: 2017-07-28

## 2018-01-09 NOTE — MISCELLANEOUS
Message  Left message on pt voicemail [facility] will be contacting her for shipment approval of Marisa to [facility]  Medication approved per [address] [facility] [place] supervisor  Phone number provided to pt [phone]  Active Problems    1  Allergic rhinitis (477 9) (J30 9)   2  Antepartum bleeding (641 93) (O46 90)   3  Anxiety disorder (300 00) (F41 9)   4  Asthma (493 90) (J45 909)   5  Encounter for pregnancy related examination in second trimester (V22 1) (Z34 82)   6  History of placental abruption (V13 29) (Z87 59)   7  History of premature delivery, currently pregnant (V23 41) (O09 219)   8  Nephrolithiasis (592 0) (N20 0)   9  History of  premature rupture of membranes in third trimester, unspecified   duration to onset of labor (658 13) (O42 913)   10  Rhinosinusitis (473 9) (J32 9)    Current Meds   1  Advair Diskus 250-50 MCG/DOSE Inhalation Aerosol Powder Breath Activated; PRN; Therapy: (Recorded:63Ljj7152) to Recorded   2  PreNatal [facility] CAPS; Therapy: (Recorded:73Vbp4859) to Recorded   3  Prenatal One Daily TABS; Therapy: (Recorded:98Gkv8712) to Recorded    Allergies    1  Sulfa Drugs    2  No Known Environmental Allergies   3   No Known Food Allergies    Signatures   Electronically signed by : Sal Garces RN; 2017  9:06AM EST                       (Author)

## 2018-01-10 NOTE — MISCELLANEOUS
Message  Contacted [employer] regarding if prior auth required for Willard code and diagnosis codes given  Maria Fernanda Ridley stated no prior auth required reference number [id number]  Jam FAGAN and reference info given to them to expedite approval process  Active Problems    1  Allergic rhinitis (477 9) (J30 9)   2  Antepartum bleeding (641 93) (O46 90)   3  Anxiety disorder (300 00) (F41 9)   4  Asthma (493 90) (J45 909)   5  Encounter for pregnancy related examination in second trimester (V22 1) (Z34 82)   6  History of placental abruption (V13 29) (Z87 59)   7  History of premature delivery, currently pregnant (V23 41) (O09 219)   8  Nephrolithiasis (592 0) (N20 0)   9  History of  premature rupture of membranes in third trimester, unspecified   duration to onset of labor (658 13) (O42 913)   10  Rhinosinusitis (473 9) (J32 9)    Current Meds   1  Advair Diskus 250-50 MCG/DOSE Inhalation Aerosol Powder Breath Activated; PRN; Therapy: (Recorded:51Yct5516) to Recorded   2  PreNatal [facility] CAPS; Therapy: (Recorded:92Dec2954) to Recorded   3  Prenatal One Daily TABS; Therapy: (Recorded:10Fhd0996) to Recorded    Allergies    1  Sulfa Drugs    2  No Known Environmental Allergies   3   No Known Food Allergies    Signatures   Electronically signed by : Shyla Land RN; Bruce 10 2017  9:41AM EST                       (Author)

## 2018-01-12 VITALS
SYSTOLIC BLOOD PRESSURE: 119 MMHG | DIASTOLIC BLOOD PRESSURE: 62 MMHG | HEIGHT: 62 IN | WEIGHT: 138.4 LBS | BODY MASS INDEX: 25.47 KG/M2

## 2018-01-12 VITALS
HEIGHT: 62 IN | SYSTOLIC BLOOD PRESSURE: 97 MMHG | DIASTOLIC BLOOD PRESSURE: 66 MMHG | WEIGHT: 154.03 LBS | BODY MASS INDEX: 28.35 KG/M2

## 2018-01-12 NOTE — PROGRESS NOTES
2017         RE: Nikko Bazzinik                                To: [facility]   MR#: [id number]                                   [address]   :  [address], [address] [facility]   ENC: [id number]                             Fax: [phone]   (Exam #: [id number])      The LMP of this 27year old,  G4, P0-1-2-1 patient was unknown, her   working [employer] is  and the current gestational age is 22 weeks 2   days by [address] [place] [address]  A sonographic examination was performed on 2017 using real time equipment  The ultrasound examination was   performed using abdominal & vaginal techniques  The patient has a BMI of   25 8  Her blood pressure today was 132/63  Patient reports irregular 28 to 32 day menstrual cycles prior to   conception  Earliest ultrasound found in her record:   16    5w2d  7/3/17   11/7/16    6w6d  17  This ultrasound was used for dating   11/15/16  8w1d  17 MICKY               Filomena Weathers has no complaints  She denies abdominal or pelvic pain, vaginal   bleeding, mucoid vaginal discharge, or suspected PPROM  Zeyad Bryan is   currently treated with weekly IM progesterone given her history of a prior   spontaneous  birth  Cardiac motion was observed at 129 bpm       INDICATIONS      previous  delivery   missed anatomy follow up      Exam Types      Transvaginal   Level I      RESULTS      Fetus # 1 of 1   Vertex presentation   Placenta Location = Anterior   No placenta previa   Placenta Grade = I      AMNIOTIC FLUID         Largest Vertical Pocket = 4 2 cm   Amniotic Fluid: Normal      CERVICAL EVALUATION      The cervix appeared normal (Ultrasound Examination)  SUPINE      Cervical Length: 3 60 cm      OTHER TEST RESULTS           Funneling?: No             Dynamic Changes?: No        Resp   To TFP?: No                      Debris?: No      ANATOMY DETAILS      Visualized Appearing Sonographically Normal:   HEART: (Four [facility] [employer], Ductal Arch, Interventricular Septum,   Interatrial Septum, IVC, SVC, Cardiac Axis, Cardiac Position)      ANATOMY COMMENTS      The prior fetal anatomic survey was limited with respect to evaluation of   the cardiac four-chamber, septal, ductal arch, and caval views  These   anatomic views were seen today as sonographically normal within the   inherent limitations of fetal ultrasound  IMPRESSION      Jimenez IUP   21 weeks and 2 days by 1st Tri Sono  (MICKY=JUN 27 2017)   Vertex presentation   Regular fetal heart rate of 129 bpm   Anterior placenta   No placenta previa      GENERAL COMMENT      The cervix is normal in appearance by transvaginal sonography  The   cervical length is normal   Cervical debris is not present  Cervical   funneling is not present  Neither provocative nor dynamic change is   appreciated  Detailed evaluation of fetal growth and anatomy was not performed at the   visit today  Instead, attention was drawn toward assessment of cardiac   anatomic targets not imaged well on the level II ultrasound study  These   cardiac views appear normal today  The placenta and amniotic fluid volume   appear normal       [facility] was given an IM injection of 250 mg of 17-OHPC at her visit today  Today's ultrasound findings and suggested follow-up were discussed in   detail with [facility]  She will return to the [facility]  in 2 weeks for   cervical sonography  Cervical cerclage is recommended with cervical   shortening below 25 mm prior to 24 weeks gestation  Fetal growth will be   reassessed in the [facility]  in the early third trimester  Continuation of weekly 17-OHPC is recommended through 36 completed weeks   gestation  The face to face time, in addition to time spent discussing ultrasound   results, was approximately 10 minutes, greater than 50% of which was spent   during counseling and coordination of care        Lor Bennett CHRISTINA Meyer , CHRISTINA PAYNE S  SHELDON Guzman     Maternal-Fetal Medicine   Electronically signed 02/16/17 15:11

## 2018-01-12 NOTE — PROGRESS NOTES
MAR 30 2017         RE: Bethany Mills                                To: [facility]   MR#: [id number]                                   [address]   :  [address] [place], [facility]   ENC: [id number]                             Fax: [phone]   (Exam #: [id number])      The LMP of this 34year old,  G4, P0-1-2-1 patient was unknown, her   working [employer] is  and the current gestational age is 32 weeks 2   days by 2rd Trimester Sono  A sonographic examination was performed on MAR   30 2017 using real time equipment  The ultrasound examination was   performed using abdominal technique  The patient has a BMI of 27 8  Her   blood pressure today was 120/62  Patient reports irregular 28 to 32 day menstrual cycles prior to   conception  Earliest ultrasound found in her record:   16    5w2d  7/3/17   11/7/16    6w6d  17  This ultrasound was used for dating   11/15/16  8w1d  17 MICKY      Problem list:   1  History of a prior pregnancy that developed vaginal bleeding at 30   weeks and possible leaking at 32 weeks but PPROM was not confirmed even by   amnio till 34 weeks and 5 days when she developed overt PPROM and required   an induction  Her baby weighed 5 lbs  5 oz  at 34 weeks and did well  She   is receiving Marisa injections through our office     2  History of 2 first trimester losses            Cardiac motion was observed at 140 bpm       INDICATIONS      previous  delivery      Exam Types      Level I      RESULTS      Fetus # 1 of 1   Vertex presentation   Placenta Location = Anterior   No placenta previa   Placenta Grade = I      MEASUREMENTS (* Included In Average GA)      AC              22 4 cm        26 weeks 5 days* (35%)   BPD              7 0 cm        28 weeks 1 day * (58%)   HC              26 2 cm        28 weeks 1 day * (56%)   Femur            5 1 cm        27 weeks 4 days* (45%)      Cerebellum 3 4 cm        30 weeks 1 day      HC/AC           1 17   FL/AC           0 23   FL/BPD          0 73   EFW (Ac/Fl/Hc)  1051 grams - 2 lbs 5 oz                 (46%)      THE AVERAGE GESTATIONAL AGE is 27 weeks 4 days +/- 14 days  AMNIOTIC FLUID      Q1: 4 2      Q2: 3 4      Q3: 4 2      Q4: 5 3   PAULIE Total = 17 1 cm   Amniotic Fluid: Normal      CERVICAL EVALUATION      SUPINE      Cervical Length: 4 60 cm      OTHER TEST RESULTS           Funneling?: No      ANATOMY DETAILS      Visualized Appearing Sonographically Normal:   HEAD: (Calvarium, BPD Level, Cavum, Lateral Ventricles, Choroid Plexus,   Cerebellum, Cisterna Magna);    TH  CAV : (Diaphragm); HEART: (Four   Chamber View, Proximal Left Outflow, Proximal Right Outflow, Cardiac Axis,   Cardiac Position);    STOMACH, RIGHT KIDNEY, LEFT KIDNEY, BLADDER, PLACENTA      ANATOMY COMMENTS      Her fetal anatomy was completed on a prior scan  IMPRESSION      Jimenez IUP   27 weeks and 4 days by this ultrasound  (MICKY=JUN 25 2017)   27 weeks and 2 days by 3rd Trimester Sono  (MICKY=JUN 27 2017)   Vertex presentation   Regular fetal heart rate of 140 bpm   Anterior placenta   No placenta previa      GENERAL COMMENT      The patient was informed of the findings and counseled about the   limitations of the exam in detecting all forms of fetal congenital   abnormalities  She denies any vaginal bleeding  She reports some episodes of uterine   cramping/contractions that occur at least 4 times or more in one hour   since her last US  She does feel fetal movement  She denies contractions   currently today  Her cervix is long and closed by review of her cervix   length by abdominal scan  Exam shows she is comfortable and her abdomen is non tender  Follow up recommended:   1  A 34 week US is planned           The face to face time, in addition to time spent discussing ultrasound   results, was approximately 15 minutes, greater than 50% of which was spent   during counseling and coordination of care  CHRISTINA Sheridan , CHRISTINA PAYNE S  SHELDON Riley     Maternal-Fetal Medicine   Electronically signed 03/30/17 16:41

## 2018-01-13 VITALS
SYSTOLIC BLOOD PRESSURE: 102 MMHG | DIASTOLIC BLOOD PRESSURE: 58 MMHG | BODY MASS INDEX: 27.6 KG/M2 | WEIGHT: 150 LBS | HEIGHT: 62 IN

## 2018-01-13 VITALS
BODY MASS INDEX: 28.71 KG/M2 | DIASTOLIC BLOOD PRESSURE: 61 MMHG | SYSTOLIC BLOOD PRESSURE: 110 MMHG | WEIGHT: 156 LBS | HEIGHT: 62 IN

## 2018-01-13 VITALS
WEIGHT: 152.4 LBS | BODY MASS INDEX: 28.05 KG/M2 | SYSTOLIC BLOOD PRESSURE: 120 MMHG | HEIGHT: 62 IN | DIASTOLIC BLOOD PRESSURE: 62 MMHG

## 2018-01-13 VITALS — DIASTOLIC BLOOD PRESSURE: 62 MMHG | SYSTOLIC BLOOD PRESSURE: 100 MMHG

## 2018-01-13 NOTE — PROGRESS NOTES
DEC 16 2016         RE: Chichi Molina                                To: [facility]   MR#: [id number]                                   [address]   : [address], [address] [place] [address]   ENC: [id number]                             Fax: [phone]   (Exam #: [id number])      The LMP of this 27year old,  G4, P0-1-2-1 patient was unknown, her   working [employer] is  and the current gestational age is 16 weeks 3   days by [address] [place] [address]  A sonographic examination was performed on DEC   16 2016 using real time equipment  The ultrasound examination was   performed using abdominal technique  The patient has a BMI of 22 7  Her   blood pressure today was 118/77  Patient reports irregular 28 to 32 day menstrual cycles prior to   conception  Earliest ultrasound found in her record:   16    5w2d  7/3/17   11/7/16    6w6d  17  This ultrasound was used for dating   11/15/16  8w1d  17 MICKY      Problem list:   1  Hx of 2 prior first trimester losses at 6 and 7 weeks  2  Hx of a prior 29 w5d PTD weighing 5lb 6 oz secondary to PPROM possibly   brought on by a prior history of vaginal bleeding from a probable   abruption in the third trimester  Her screen for Gc/chlamydia, bv and   urine infections were all normal in this pregnancy  3  First trimester bleeding with a small subchorionic bleed  Multiple longitudinal and transverse sections revealed a sy   intrauterine pregnancy with the fetus in variable presentation  The   placenta is anterior in implantation, and there is no placenta previa        Cardiac motion was observed at 160 bpm       INDICATIONS      first trimester genetic screening      Exam Types      Level I      RESULTS      Fetus # 1 of 1   Variable presentation   Fetal growth appeared normal      MEASUREMENTS (* Included In Average GA)      CRL              6 6 cm        12 weeks 5 days*   Nuchal Trans    1 00 mm      THE AVERAGE GESTATIONAL AGE is 12 weeks 5 days +/- 7 days  ANATOMY COMMENTS      Anatomic detail is limited at this gestational age  The yolk sac was not   noted  The fetal cranium appeared normal in shape and the nuchal   translucency was normal in size (1 0mm)  The nasal bone appears to be   present  The intracranial anatomy was unremarkable  Evaluation of the   spine revealed no obvious evidence for a neural tube defect  Anatomy of   the fetal thorax appeared within normal limits  The cardiac rhythm was   regular  Within the abdomen, stomach & bladder were visualized and the   abdominal wall appeared intact  A three vessel cord appears to be present  Active movement of the fetal body & extremities was seen  There is no   suspicion of a subchorionic bleed  The placental cord insertion was   normal    There is no suspicion of a uterine myoma  Free fluid is not seen   in the posterior cul-de-sac  ADNEXA      The left ovary appeared normal and measured 1 9 x 1 4 x 1 8 cm with a   volume of 2 5 cc  The right ovary appeared normal and measured 1 8 x 1 7 x   1 5 cm with a volume of 2 4 cc  AMNIOTIC FLUID         Largest Vertical Pocket = 3 5 cm   Amniotic Fluid: Normal      IMPRESSION      Jimenez IUP   12 weeks and 5 days by this ultrasound  (MICKY=JUN 25 2017)   12 weeks and 3 days by [facility] Tri Sono  (MICKY=JUN 27 2017)   Variable presentation   Fetal growth appeared normal   Regular fetal heart rate of 160 bpm   Anterior placenta   No placenta previa      GENERAL COMMENT      OFFICE CONSULT      As per your request, an ultrasound was performed on your patient for the   following indication: sequential screen      The patient was informed of the findings and counseled about the   limitations of the exam in detecting all forms of fetal congenital   abnormalities  She denies any vaginal bleeding or uterine cramping/contractions  Her   early glucola screen was 46        Today's ultrasound findings and suggested follow-up were discussed in   detail with the patient  The Sequential Screen was discussed in detail,   including the sensitivities for detection of Down syndrome, Trisomy 18,   and open neural tube defects  The patient underwent fingerstick blood   collection for hCG and VERÓNICA-A to complete the initial component of the   Sequential Screen  Results should be available within one week  Follow up recommended:   1  Follow-up multiple marker serum screening at 16-18 weeks' gestation is   recommended to complete the Sequential Screen  2  Fetal Level II ultrasound imaging is recommended at 19-20 weeks'   gestation  3  I reviewed my prior recommendations with Renee Plaza in depth  She thought   that today we would discuss whether I thought she would need Loveonx   prophylaxis  I thought I convinced her at her prior visit that she does   not have a hypercoaguable state that would benefit from Lovenox   prophylaxis  I do not feel though that she is convinced and I offered a   second opinion from Dr Serg Angel at her next visit  I have scheduled her for   a 16 week visit and a TVS with Dr Serg Angel  4  Please see my prior consult for the rest of the recommendations on   11/15/16  The face to face time, in addition to time spent discussing ultrasound   results, was approximately 15 minutes, greater than 50% of which was spent   during counseling and coordination of care  CHRISTINA Fields M D     Maternal-Fetal Medicine   Electronically signed 12/17/16 16:45

## 2018-01-13 NOTE — MISCELLANEOUS
Message  phone message from patient, wants to make appointment for [facility] injections  phone call to [facility] [phone], s/w Srini Gao has not heard back if prior auth completed, explained we already have reference # from [employer], per Gregorio we need to contact Galo Feliciano directly for any further information, [facility] is third party  Phone call to [employer] Felician[employer] [phone], s/w Jaylene Gomez, representative working on case is not available/ no further information  I told Jaylene Gomez we had this same message 17 and it is not acceptable  Per Jaylene Gomez she will forward case to another representative  Active Problems    1  Allergic rhinitis (477 9) (J30 9)   2  Antepartum bleeding (641 93) (O46 90)   3  Anxiety disorder (300 00) (F41 9)   4  Asthma (493 90) (J45 909)   5  Encounter for pregnancy related examination in second trimester (V22 1) (Z34 82)   6  History of placental abruption (V13 29) (Z87 59)   7  History of premature delivery, currently pregnant (V23 41) (O09 219)   8  Nephrolithiasis (592 0) (N20 0)   9  History of  premature rupture of membranes in third trimester, unspecified   duration to onset of labor (658 13) (O42 913)   10  Rhinosinusitis (473 9) (J32 9)    Current Meds   1  Advair Diskus 250-50 MCG/DOSE Inhalation Aerosol Powder Breath Activated; PRN; Therapy: (Recorded:11Lxv9955) to Recorded   2  PreNatal [facility] CAPS; Therapy: (Recorded:26Wdo0528) to Recorded   3  Prenatal One Daily TABS; Therapy: (Recorded:2016) to Recorded    Allergies    1  Sulfa Drugs    2  No Known Environmental Allergies   3   No Known Food Allergies    Signatures   Electronically signed by : Odette Ambrosio, ; 2017  1:44PM EST                       (Author)

## 2018-01-14 VITALS
HEIGHT: 62 IN | SYSTOLIC BLOOD PRESSURE: 132 MMHG | DIASTOLIC BLOOD PRESSURE: 63 MMHG | WEIGHT: 141.4 LBS | BODY MASS INDEX: 26.02 KG/M2

## 2018-01-14 VITALS
HEIGHT: 62 IN | BODY MASS INDEX: 26.5 KG/M2 | DIASTOLIC BLOOD PRESSURE: 60 MMHG | SYSTOLIC BLOOD PRESSURE: 110 MMHG | WEIGHT: 144 LBS

## 2018-01-14 VITALS
WEIGHT: 136 LBS | HEIGHT: 62 IN | DIASTOLIC BLOOD PRESSURE: 58 MMHG | SYSTOLIC BLOOD PRESSURE: 110 MMHG | BODY MASS INDEX: 25.03 KG/M2

## 2018-01-14 VITALS — DIASTOLIC BLOOD PRESSURE: 70 MMHG | BODY MASS INDEX: 25.42 KG/M2 | SYSTOLIC BLOOD PRESSURE: 120 MMHG | WEIGHT: 139 LBS

## 2018-01-14 VITALS — WEIGHT: 130 LBS | DIASTOLIC BLOOD PRESSURE: 66 MMHG | SYSTOLIC BLOOD PRESSURE: 102 MMHG | BODY MASS INDEX: 23.78 KG/M2

## 2018-01-14 VITALS
HEIGHT: 62 IN | WEIGHT: 134.4 LBS | BODY MASS INDEX: 24.73 KG/M2 | SYSTOLIC BLOOD PRESSURE: 120 MMHG | DIASTOLIC BLOOD PRESSURE: 61 MMHG

## 2018-01-14 NOTE — MISCELLANEOUS
Message  [facility] [place] [facility]   cannot commit to when [facility] will be delivered due to weather   She states [employer] will contact us with a delivery date  Active Problems    1  Allergic rhinitis (477 9) (J30 9)   2  Anxiety disorder (300 00) (F41 9)   3  Asthma (493 90) (J45 909)   4  Encounter for pregnancy related examination in second trimester (V22 1) (Z34 82)   5  History of placental abruption (V13 29) (Z87 59)   6  History of  delivery, currently pregnant in second trimester (V23 41) (O09 212)   7  History of  premature rupture of membranes in third trimester, unspecified   duration to onset of labor (658 13) (O42 913)    Current Meds   1  Advair Diskus 250-50 MCG/DOSE Inhalation Aerosol Powder Breath Activated; PRN; Therapy: (Recorded:25Mmf0469) to Recorded   2  Claritin 10 MG Oral Capsule; Therapy: (Recorded:2017) to Recorded   3  Marisa 250 MG/ML Intramuscular Oil (Hydroxyprogesterone Caproate); INJECT 250    MG Intramuscular 250mg/ml  weekly until 36 weeks gestation; Therapy: [id number] to (Last Rx:2017) Ordered   4  PreNatal [facility] CAPS; Therapy: (Recorded:2016) to Recorded   5  Prenatal One Daily TABS; Therapy: (Recorded:2016) to Recorded    Allergies    1  Sulfa Drugs    2  No Known Environmental Allergies   3   No Known Food Allergies    Signatures   Electronically signed by : Shemar Smith, ; Mar 15 2017  1:22PM EST                       (Author)

## 2018-01-14 NOTE — PROGRESS NOTES
NOV 15 2016         RE: Mk Temple                                To: [facility]   MR#: [id number]                                   [address]   : FEB [address], [address] [facility]   ENC: [id number]                             Fax: [phone]   (Exam #: [id number])      The LMP of this 27year old,  G4, P0-1-0-1 patient was SEP 13 2016, her   working MICKY is 2017 and the current gestational age is 9 weeks 6   days by [address] [place] [address]  A sonographic examination was performed on NOV   15 2016 using real time equipment  The ultrasound examination was   performed using abdominal technique  The patient has a BMI of 21 8  Her   blood pressure today was 125/56  Earliest ultrasound found in her record:   16    5w2d  7/3/17   11/7/16    6w6d  17  This ultrasound was used for dating   11/15/16  8w1d  17 MICKY      She is on Claritin 10 mg daily and prenatal vitamins daily  She is   allergic to sulfa drugs but she is not sure of her reaction as she had it   when she was a child  She denies any use of cigarettes, alcohol or any   illicit drugs  Her past medical history is not significant  Her OB history   includes a 29 w5d  delivery for a PPROM in   She reports she   started bleeding at approximately 30 weeks and delivered a 5 lbs  6 oz    baby at 34 weeks  She also has a seven-week missed AB that required a D&E   in 2016 and a six-week spontaneous AB in 2016  She reports a   family history of hypertension in her father and denies any family history   of diabetes, thrombosis, or congenital anomalies  I reviewed her 2014   delivery in depth  The delivery summary reports that she had an episode   of vaginal bleeding at 30 weeks  She was given steroids   She then had   symptoms of PPROM which could not be confirmed and had an amniocentesis   with placement of indigo carmine that showed no evidence of PPROM or infection  She then had overt rupture membranes at 34 weeks and 5 days  During the second stage of labor she had several gushes of blood from a   probable acute abruption  At the time of the delivery of the baby's   placenta there were some areas of clot consistent with a current abruption   and some areas of calcification consistent with an old abruption  The   patient required a vacuum for delivery  On March 11 2016 after her seven-week miscarriage was diagnosed, blood   work to rule out a clotting disorder was ordered : free protein S was   slightly low at 48 which is normal for pregnancy, anticardiolipin, lupus   anticoagulant, beta 2 glycoprotein, protein C, antithrombin III, factor V   5 Leiden and homocysteine were all normal  Prothrombin gene was ordered   but was not drawn for some reason  Follow-up protein S levels done outside   of pregnancy returned to normal proving this was a false positive of   pregnancy  I did not see any evidence of a MicroArray drawn after either   first trimester loss  She has had some spotting in this pregnancy  Her blood type is O+  Multiple longitudinal and transverse sections revealed a jimenez   intrauterine pregnancy  A normal gestational sac was documented  A normal fetal pole was   visualized  Cardiac motion was observed at 152 bpm  The yolk sac was not   seen  The amnion was documented  INDICATIONS      viability      Exam Types      Level I      MEASUREMENTS (* Included In Average GA)      CRL              1 7 cm        8 weeks 1 day  *      THE AVERAGE GESTATIONAL AGE is 8 weeks 1 day +/- 5 days  ADNEXA      The left ovary was not visualized  The right ovary was not visualized        IMPRESSION      Jimenez IUP   8 weeks and 1 day by this ultrasound  (MICKY=JUN 26 2017)   7 weeks and 6 days by [facility] Tri Sono  (MICKY=JUN 28 2017)   Regular fetal heart rate of 152 bpm      GENERAL COMMENT      OFFICE CONSULT      As per your request, a consultation was performed on your patient for the   following indication:viability scan and history of 2 prior first trimester   losses and a prior 34 week  delivery secondary to PPROM possibly   brought on by a prior history of vaginal bleeding from a probable   abruption in the third trimester  The patient was informed of the findings and counseled about the   limitations of the exam in detecting all forms of fetal congenital   abnormalities  She denies any vaginal bleeding today or any uterine cramping/contractions  Exam shows she is comfortable and her abdomen is non tender  Follow up recommended:   1  Recommend screening for GC, Chlamydia, urine culture and BV and   treating with oral antibiotics if any are found  2  Recommend offering [place] between 16-20 weeks which should continue   till her last dose at 36 weeks  This can be done locally or in [facility]  Please   let us know if [facility] should arrange these shots  3  Recommend a follow-up ultrasound at 12 weeks for sequential screen and   a 20 week anatomy scan  4  Recommend a follow-up ultrasound for growth at 28 and 34 weeks given   her possible history of an abruption  [address] does not have a protein S deficiency  No further protein S levels   are recommended  6  Recommend screening for diabetes which can be a potential cause for   early pregnancy loss  7  Recent literature does not promote a workup for an inherited   hypercoagulable state for patients with first trimester losses or   pregnancies complicated by prior abruptions  [facility]  This can be drawn   today to complete her workup but again inherited thrombophilias are not   felt to be associated with first trimester losses or abruption  6  Acacia Campos does not want to go on a baby aspirin daily   She was reassured   that currently baby aspirin is offered by some providers due to a history   of abruption or early pregnancy loss  The theoretic benefit though has not   been proven nor has there been any risk to taking baby aspirin been found  Women aged 22-40 with a history of one to two prior losses and actively   trying to conceive were randomized (n = 615 LDA and n = 613 placebo) at   four clinical centers in the [facility] ([id number])  Daily LDA initiated preconception was not associated with clinically   recognized pregnancy losses or implantation failures among women with   proved fecundity and a history of one to two prior losses  Specifically,   1088 (88 6%) women completed the trial with 797 having an hCG detected   pregnancy (64 9%)  Overall there were 133 clinical losses (12 7% LDA   versus 11 8% placebo, P = 0 71) and 55 implantation failures (5 2% LDA   versus 4 9% placebo, P = 0 89)  No differences were found in rate of   euploid losses (RR 1 11, 95% confidence interval: 0 99, 1 26)  Ron STEPHENS, et al Hum Reprod  2016;31(3):344       9  Recommend using her second ultrasound for dating her pregnancy, as I   suspect her 5 week ultrasound may be difficult to measure the fetal pole   well at such an early gestational age  10  Should she miscarry later on in this pregnancy recommend offering a   MicroArray  The majority of time (greater then 50%) was spent on counseling and   coordination of care of this patient and /or family member  Approximate   face to face time was 15 minutes  CHRISTINA Mccurdy M D     Maternal-Fetal Medicine   Electronically signed 11/17/16 15:48

## 2018-01-15 VITALS
WEIGHT: 147.4 LBS | BODY MASS INDEX: 27.12 KG/M2 | DIASTOLIC BLOOD PRESSURE: 62 MMHG | HEIGHT: 62 IN | SYSTOLIC BLOOD PRESSURE: 121 MMHG

## 2018-01-15 VITALS
SYSTOLIC BLOOD PRESSURE: 121 MMHG | DIASTOLIC BLOOD PRESSURE: 58 MMHG | WEIGHT: 155.2 LBS | HEIGHT: 62 IN | BODY MASS INDEX: 28.56 KG/M2

## 2018-01-15 NOTE — MISCELLANEOUS
Message  Cjntacted [facility] regarding status on Marisa approval-rep managing case not available (Mary)-message left with return call to [facility] to update us on status  Active Problems    1  Allergic rhinitis (477 9) (J30 9)   2  Antepartum bleeding (641 93) (O46 90)   3  Anxiety disorder (300 00) (F41 9)   4  Asthma (493 90) (J45 909)   5  Encounter for pregnancy related examination in first trimester (V22 1) (Z34 81)   6  History of placental abruption (V13 29) (Z87 59)   7  History of premature delivery, currently pregnant (V23 41) (O09 219)   8  Nephrolithiasis (592 0) (N20 0)   9  History of  premature rupture of membranes in third trimester, unspecified   duration to onset of labor (658 13) (O42 913)   10  Rhinosinusitis (473 9) (J32 9)    Current Meds   1  Advair Diskus 250-50 MCG/DOSE Inhalation Aerosol Powder Breath Activated; PRN; Therapy: (Recorded:[id number]) to Recorded   2  PreNatal [facility] CAPS; Therapy: (Recorded:[id number]) to Recorded   3  Prenatal One Daily TABS; Therapy: (Recorded:2016) to Recorded    Allergies    1  Sulfa Drugs    2  No Known Environmental Allergies   3   No Known Food Allergies    Signatures   Electronically signed by : Jose C Boyle RN; 2017 10:29AM EST                       (Author)

## 2018-01-15 NOTE — MISCELLANEOUS
Message  Contacted [facility] in [address] regarding [address] [facility] delivery status  Stated in process  Stated to representative to expedite  Contacted them regarding status on Thursday of last week and they stated they needed 24-48 hours  Will contact [facility] this pm for update  Active Problems    1  Allergic rhinitis (477 9) (J30 9)   2  Antepartum bleeding (641 93) (O46 90)   3  Anxiety disorder (300 00) (F41 9)   4  Asthma (493 90) (J45 909)   5  Encounter for pregnancy related examination in first trimester (V22 1) (Z34 81)   6  History of placental abruption (V13 29) (Z87 59)   7  History of premature delivery, currently pregnant (V23 41) (O09 219)   8  Nephrolithiasis (592 0) (N20 0)   9  History of  premature rupture of membranes in third trimester, unspecified   duration to onset of labor (658 13) (O42 913)   10  Rhinosinusitis (473 9) (J32 9)    Current Meds   1  Advair Diskus 250-50 MCG/DOSE Inhalation Aerosol Powder Breath Activated; PRN; Therapy: (Recorded:[id number]) to Recorded   2  PreNatal [facility] CAPS; Therapy: (Recorded:[id number]) to Recorded   3  Prenatal One Daily TABS; Therapy: (Recorded:2016) to Recorded    Allergies    1  Sulfa Drugs    2  No Known Environmental Allergies   3   No Known Food Allergies    Signatures   Electronically signed by : Shashi Dorsey RN; Bruce  3 2017  8:27AM EST                       (Author)

## 2018-01-15 NOTE — PROGRESS NOTES
FEB 3 2017         RE: Angely Kam                                To: [facility]   MR#: [id number]                                   [address]   :  Corin Gamble, [address] [place] [address]   ENC: [id number]                             Fax: [phone]   (Exam #: [id number])      The LMP of this 27year old,  G4, P0-1-2-1 patient was unknown, her   working MICKY is  and the current gestational age is 24 weeks 3   days by 1st Trimester Sono  A sonographic examination was performed on FEB   3 2017 using real time equipment  The ultrasound examination was performed   using abdominal & vaginal techniques  The patient has a BMI of 24 9  Her   blood pressure today was 110/58  Patient reports irregular 28 to 32 day menstrual cycles prior to   conception  Earliest ultrasound found in her record:   16    5w2d  7/3/17   11/7/16    6w6d  17  This ultrasound was used for dating   11/15/16  8w1d  17 MICKY      Problem list:   1  Hx of 2 prior first trimester losses at 6 and 7 weeks  Her early   diabetes screen was normal  Prior work up showed no evidence for thyroid   or antiphospholid abnormalties  2  Hx of a prior 29 w5d PTD weighing 5lb 6 oz secondary to PPROM possibly   brought on by a prior history of vaginal bleeding from a probable   abruption in the third trimester  Her screen for Gc/chlamydia, bv and   urine infections were all normal in this pregnancy  3  First trimester bleeding with a small subchorionic bleed        Cardiac motion was observed at 138 bpm       INDICATIONS      fetal anatomical survey   previous  delivery   previous placental abruption      Exam Types      LEVEL II   Transvaginal      RESULTS      Fetus # 1 of 1   Vertex presentation   Fetal growth appeared normal   Placenta Location = Anterior   No placenta previa   Placenta Grade = I      MEASUREMENTS (* Included In Average GA)      AC              15 2 cm        20 weeks 1 day * (67%)   BPD              4 7 cm        20 weeks 2 days* (73%)   HC              17 5 cm        19 weeks 6 days* (63%)   Femur            3 0 cm        19 weeks 4 days* (43%)      Nuchal Fold      2 7 mm   NBL              5 8 mm      Humerus          2 9 cm        19 weeks 2 days  (49%)      Cerebellum       2 1 cm        20 weeks 3 days   Biorbit          2 9 cm        18 weeks 6 days   CisternaMagna    3 8 mm      HC/AC           1 15   FL/AC           0 20   FL/BPD          0 64   EFW (Ac/Fl/Hc)   322 grams - 0 lbs 11 oz      THE AVERAGE GESTATIONAL AGE is 20 weeks 0 days +/- 10 days  AMNIOTIC FLUID         Largest Vertical Pocket = 5 0 cm   Amniotic Fluid: Normal      CERVICAL EVALUATION      The cervix appeared normal (Ultrasound Examination)  SUPINE      Cervical Length: 3 50 cm      OTHER TEST RESULTS           Funneling?: No             Dynamic Changes?: No        Resp  To TFP?: No      ANATOMY      Head                                    Normal   Face/Neck                               Normal   Th  Cav  Normal   Heart                                   See Details   Abd  Cav  Normal   Stomach                                 Normal   Right Kidney                            Normal   Left Kidney                             Normal   Bladder                                 Normal   Abd  Wall                               Normal   Spine                                   Normal   Extrems                                 Normal   Genitalia                               Normal   Placenta                                Normal   Umbl   Cord                              Normal   Uterus                                  Normal   PCI                                     Normal      ANATOMY DETAILS      Visualized Appearing Sonographically Normal:   HEAD: (Calvarium, BPD Level, Cavum, Lateral Ventricles, Choroid Plexus, Cerebellum, Cisterna Magna);    FACE/NECK: (Neck, Nuchal Fold, Profile,   Orbits, Nose/Lips, Palate, Face);    TH  CAV  : (Lungs, Diaphragm); HEART: (Proximal Left Outflow, Proximal Right Outflow, 3VV, 3 Vessel   Trachea, Short Axis of Greater Vessels, Aortic Arch, Cardiac Axis, Cardiac   Position);    ABD  CAV : (Liver);    STOMACH, RIGHT KIDNEY, LEFT KIDNEY,   BLADDER, ABD  WALL, SPINE: (Cervical Spine, Thoracic Spine, Lumbar Spine,   Sacrum);    EXTREMS: (Lt Humerus, Rt Humerus, Lt Forearm, Rt Forearm, Lt   Hand, Rt Hand, Lt Femur, Rt Femur, Lt Low Leg, Rt Low Leg, Lt Foot, Rt   Foot);    GENITALIA (Male), PLACENTA, UMBL  CORD, UTERUS, PCI      Not Visualized:   HEART: (Four Chamber View, Ductal Arch, Interventricular Septum,   Interatrial Septum, IVC, SVC)      ANATOMY COMMENTS      Her survey of the fetal anatomy is not complete  No fetal structural abnormality or ultrasound marker for aneuploidy is   identified on the Level II ultrasound study today  The missed or limited   views above are secondary to her skin thickness, fetal position, late   gestational age  Fetal growth and amniotic fluid volume appear normal     Active movement of the fetal body & extremities was seen  There is no   suspicion of a subchorionic bleed  The placental cord insertion was   normal       ADNEXA      The left ovary appeared normal and measured 2 4 x 1 9 x 1 0 cm with a   volume of 2 4 cc  The right ovary appeared normal and measured 2 7 x 1 6 x   1 2 cm with a volume of 2 7 cc        IMPRESSION      Jimenez IUP   20 weeks and 0 days by this ultrasound  (MICKY=JUN 23 2017)   19 weeks and 3 days by 1st Tri Sono  (MICKY=JUN 27 2017)   Vertex presentation   Fetal growth appeared normal   Regular fetal heart rate of 138 bpm   Anterior placenta   No placenta previa      GENERAL COMMENT      The patient was informed of the findings and counseled about the   limitations of the exam in detecting all forms of fetal congenital abnormalities  She denies any vaginal bleeding or uterine cramping/contractions  She does   feel fetal movement  Exam shows she is comfortable and her abdomen is non tender  Follow up recommended:   1  Recommend obtaining her [place] injections weekly till and including 36   weeks  2  Since her initial consult, [facility] has now made recommendations that   anybody with a prior  delivery before 37 weeks should be offered   every 2 week transvaginal scans till 24 weeks  We will add these to her   appointments that she already has scheduled for her [place] shots  3  Due to her history of an abruption in the prior pregnancy recommend a   fetal growth scan at 28 and 34 weeks         The face to face time, in addition to time spent discussing ultrasound   results, was approximately 15 minutes, greater than 50% of which was spent   during counseling and coordination of care  CHRISTINA Casillas M D     Maternal-Fetal Medicine   Electronically signed 17 20:25

## 2018-01-16 NOTE — PROGRESS NOTES
MAR 2 2017         RE: Chichi Molina                                To: [facility]   MR#: [id number]                                   [address]   : [facility], [address] [place] [address]   ENC: [id number]                             Fax: [phone]   (Exam #: [id number])      The LMP of this 34year old,  G4, P0-1-2-1 patient was unknown, her   working MICKY is  and the current gestational age is 23 weeks 2   days by 2rd Trimester Sono  A sonographic examination was performed on MAR   2 2017 using real time equipment  The ultrasound examination was performed   using abdominal & vaginal techniques  The patient has a BMI of 26 3  Her   blood pressure today was 110/60  Patient reports irregular 28 to 32 day menstrual cycles prior to   conception  Earliest ultrasound found in her record:   16    5w2d  7/3/17   11/7/16    6w6d  17  This ultrasound was used for dating   11/15/16  8w1d  17 MICKY      Problem list:   1  History of a prior pregnancy that developed vaginal bleeding at 30   weeks and possible leaking at 32 weeks but PPROM was not confirmed even by   amnio till 34 weeks and 5 days when she developed overt PPROM and required   an induction  Her baby weighed 5 lbs  5 oz  at 34 weeks and did well  She   is receiving [place] injections  2  History of 2 first trimester losses            Cardiac motion was observed at 126 bpm       INDICATIONS      previous  delivery   cervical evaluation      Exam Types      Transvaginal      RESULTS      Fetus # 1 of 1   Vertex presentation   Placenta Location = Anterior, left lateral   No placenta previa   Placenta Grade = I      AMNIOTIC FLUID         Largest Vertical Pocket = 5 3 cm   Amniotic Fluid: Normal      CERVICAL EVALUATION      SUPINE      Cervical Length: 4 00 cm      OTHER TEST RESULTS           Funneling?: No             Dynamic Changes?: No        Resp   To TFP?: No IMPRESSION      Jimenez IUP   23 weeks and 2 days by 3rd Trimester Sono  (MICKY=2017)   Vertex presentation   Regular fetal heart rate of 126 bpm   Anterior, left lateral placenta   No placenta previa      GENERAL COMMENT      Her cervix continues to be long  No further transvaginal scans are   recommended at this time unless symptoms of  labor arise  Recommend   a follow-up ultrasound for growth at 32 weeks as patients with a history   of  delivery may have an increased risk for fetal growth   restriction in future pregnancies  Ava Litten, R D M S Orrin Glisson, M D     Maternal-Fetal Medicine   Electronically signed 17 23:24

## 2018-01-16 NOTE — RESULT NOTES
Verified Results  (1) SEQUENTIAL SCREEN 2 [id number] 08:54AM Sabine Granados Quick     Test Name Result Flag Reference   SCAN RESULT      Results available in the [facility]

## 2018-01-17 NOTE — MISCELLANEOUS
Message  Marisa received in office  Active Problems    1  Acute pharyngitis (462) (J02 9)   2  Allergic rhinitis (477 9) (J30 9)   3  Antepartum bleeding (641 93) (O46 90)   4  Anxiety disorder (300 00) (F41 9)   5  Asthma (493 90) (J45 909)   6  Encounter for pregnancy related examination in second trimester (V22 1) (Z34 82)   7  History of placental abruption (V13 29) (Z87 59)   8  History of premature delivery, currently pregnant (V23 41) (O09 219)   9  Nephrolithiasis (592 0) (N20 0)   10  History of  premature rupture of membranes in third trimester, unspecified    duration to onset of labor (658 13) (O42 913)   11  Rhinosinusitis (473 9) (J32 9)   12  Sore throat (462) (J02 9)    Current Meds   1  Advair Diskus 250-50 MCG/DOSE Inhalation Aerosol Powder Breath Activated; PRN; Therapy: (Recorded:[id number]) to Recorded   2  Claritin 10 MG Oral Capsule; Therapy: (Recorded:2017) to Recorded   3  PreNatal [facility] CAPS; Therapy: (Recorded:2016) to Recorded   4  Prenatal One Daily TABS; Therapy: (Recorded:2016) to Recorded    Allergies    1  Sulfa Drugs    2  No Known Environmental Allergies   3   No Known Food Allergies    Signatures   Electronically signed by : Wallace Thomas RN; 2017 11:39AM EST                       (Author)

## 2018-01-18 NOTE — PROGRESS NOTES
MAY 5 2017         RE: Ella Silva                                To: [facility]   MR#: [id number]                                   [address]   [facility]   ENC: [id number]                             Fax: [phone]   (Exam #: [id number])      The LMP of this 34year old,  G4, P0-1-2-1 patient was unknown, her   working MICKY is  and the current gestational age is 26 weeks 3   days by 2rd Trimester Sono  A sonographic examination was performed on MAY   5 2017 using real time equipment  The ultrasound examination was performed   using abdominal technique  The patient has a BMI of 28 2  Her blood   pressure today was 97/66  Patient reports irregular 28 to 32 day menstrual cycles prior to   conception  Earliest ultrasound found in her record:   15    5w2d  7/3/17   11/7/16    6w6d  17  This ultrasound was used for dating   11/15/16  8w1d  17 MICKY      Problem list:   1  History of a prior pregnancy that developed vaginal bleeding at 30   weeks and possible leaking at 32 weeks but PPROM was not confirmed even by   amnio till 34 weeks and 5 days when she developed overt PPROM and required   an induction  Her baby weighed 5 lbs  5 oz  at 34 weeks and did well  She   is receiving Marisa injections through our office     2  History of 2 first trimester losses            Cardiac motion was observed at 134 bpm       INDICATIONS      previous  delivery   fetal growth      Exam Types      Level I      RESULTS      Fetus # 1 of 1   Vertex presentation   Fetal growth appeared normal   Placenta Location = Anterior, left lateral, fundal   No placenta previa   Placenta Grade = II      MEASUREMENTS (* Included In Average GA)      AC              28 5 cm        32 weeks 4 days* (49%)   BPD              8 1 cm        32 weeks 4 days* (44%)   HC              29 4 cm        32 weeks 0 days* (27%)   Femur 6 3 cm        32 weeks 1 day * (48%)      Cerebellum       4 5 cm        35 weeks 3 days      HC/AC           1 03   FL/AC           0 22   FL/BPD          0 77   EFW (Ac/Fl/Hc)  1969 grams - 4 lbs 5 oz                 (44%)      THE AVERAGE GESTATIONAL AGE is 32 weeks 2 days +/- 18 days  AMNIOTIC FLUID      Q1: 3 4      Q2: 4 4      Q3: 4 5      Q4: 4 2   PAULIE Total = 16 5 cm   Amniotic Fluid: Normal      ANATOMY DETAILS      Visualized Appearing Sonographically Normal:   HEAD: (Calvarium, BPD Level, Cavum, Lateral Ventricles, Cerebellum,   Cisterna Magna);    TH  CAV  : (Lungs, Diaphragm); HEART: (Four Chamber   View, Proximal Left Outflow, Proximal Right Outflow, 3VV, Cardiac Axis,   Cardiac Position);    STOMACH, RIGHT KIDNEY, LEFT KIDNEY, BLADDER, PLACENTA      IMPRESSION      Jimenez IUP   32 weeks and 2 days by this ultrasound  (MICKY=2017)   32 weeks and 3 days by 3rd Trimester Sono  (MICKY=2017)   Vertex presentation   Fetal growth appeared normal   Regular fetal heart rate of 134 bpm   Anterior, left lateral, fundal placenta   No placenta previa      GENERAL COMMENT      On exam today the patient appears well, in no acute distress, and denies   any complaints other than occasional right upper quadrant rib pain   primarily associated with fetal position  We discussed potentially   utilizing a belly band to help support her uterus to help relieve her   discomfort  There has been appropriate interval fetal growth  Good fetal movement and   tone are seen  The amniotic fluid volume appears normal   The placenta is   anterior fundal and it appears sonographically normal    The patient was   informed of today's findings and all of her questions were answered  The   limitations of ultrasound were reviewed with the patient   labor   precautions as well as fetal kick counts were reviewed  Recommend further ultrasounds as clinically indicated        Thank you very much for allowing us to participate in the care of this   very nice patient  Should you have any questions, please do not hesitate   to contact our office  Please note, in addition to the time spent discussing the results of the   ultrasound, I spent approximately 10 minutes of face-to-face time with the   patient, greater than 50% of which was spent in counseling and the   coordination of care for this patient  CHRISTINA Gardner M D     Electronically signed 05/05/17 10:55

## 2018-01-22 VITALS — WEIGHT: 156 LBS | BODY MASS INDEX: 28.53 KG/M2 | DIASTOLIC BLOOD PRESSURE: 64 MMHG | SYSTOLIC BLOOD PRESSURE: 100 MMHG

## 2018-01-22 VITALS
HEIGHT: 62 IN | SYSTOLIC BLOOD PRESSURE: 122 MMHG | BODY MASS INDEX: 28.52 KG/M2 | DIASTOLIC BLOOD PRESSURE: 69 MMHG | WEIGHT: 155 LBS

## 2018-01-22 VITALS
BODY MASS INDEX: 24.92 KG/M2 | SYSTOLIC BLOOD PRESSURE: 128 MMHG | HEIGHT: 62 IN | WEIGHT: 135.4 LBS | DIASTOLIC BLOOD PRESSURE: 54 MMHG

## 2018-01-22 VITALS — SYSTOLIC BLOOD PRESSURE: 108 MMHG | DIASTOLIC BLOOD PRESSURE: 62 MMHG | WEIGHT: 145 LBS | BODY MASS INDEX: 26.52 KG/M2

## 2018-01-22 VITALS — DIASTOLIC BLOOD PRESSURE: 62 MMHG | WEIGHT: 162 LBS | SYSTOLIC BLOOD PRESSURE: 98 MMHG | BODY MASS INDEX: 29.63 KG/M2

## 2018-01-22 VITALS
DIASTOLIC BLOOD PRESSURE: 67 MMHG | HEIGHT: 62 IN | BODY MASS INDEX: 27.97 KG/M2 | SYSTOLIC BLOOD PRESSURE: 123 MMHG | WEIGHT: 152 LBS

## 2018-01-22 VITALS — DIASTOLIC BLOOD PRESSURE: 72 MMHG | BODY MASS INDEX: 29.81 KG/M2 | WEIGHT: 163 LBS | SYSTOLIC BLOOD PRESSURE: 110 MMHG

## 2018-01-22 VITALS
SYSTOLIC BLOOD PRESSURE: 107 MMHG | HEIGHT: 62 IN | DIASTOLIC BLOOD PRESSURE: 74 MMHG | WEIGHT: 152.4 LBS | BODY MASS INDEX: 28.05 KG/M2

## 2018-01-22 VITALS — BODY MASS INDEX: 28.53 KG/M2 | WEIGHT: 156 LBS | DIASTOLIC BLOOD PRESSURE: 68 MMHG | SYSTOLIC BLOOD PRESSURE: 122 MMHG

## 2018-01-22 VITALS
DIASTOLIC BLOOD PRESSURE: 68 MMHG | WEIGHT: 141 LBS | BODY MASS INDEX: 25.95 KG/M2 | HEIGHT: 62 IN | SYSTOLIC BLOOD PRESSURE: 125 MMHG

## 2018-01-22 VITALS
HEIGHT: 62 IN | SYSTOLIC BLOOD PRESSURE: 115 MMHG | DIASTOLIC BLOOD PRESSURE: 63 MMHG | WEIGHT: 145.4 LBS | BODY MASS INDEX: 26.76 KG/M2

## 2018-01-22 VITALS
WEIGHT: 134.6 LBS | HEIGHT: 62 IN | SYSTOLIC BLOOD PRESSURE: 131 MMHG | DIASTOLIC BLOOD PRESSURE: 68 MMHG | BODY MASS INDEX: 24.77 KG/M2

## 2018-01-22 VITALS
HEIGHT: 62 IN | DIASTOLIC BLOOD PRESSURE: 59 MMHG | SYSTOLIC BLOOD PRESSURE: 118 MMHG | BODY MASS INDEX: 28.78 KG/M2 | WEIGHT: 156.4 LBS

## 2018-01-22 VITALS — WEIGHT: 149 LBS | SYSTOLIC BLOOD PRESSURE: 102 MMHG | DIASTOLIC BLOOD PRESSURE: 64 MMHG | BODY MASS INDEX: 27.25 KG/M2

## 2018-01-22 VITALS — BODY MASS INDEX: 29.81 KG/M2 | DIASTOLIC BLOOD PRESSURE: 88 MMHG | WEIGHT: 163 LBS | SYSTOLIC BLOOD PRESSURE: 124 MMHG

## 2018-01-22 VITALS — SYSTOLIC BLOOD PRESSURE: 123 MMHG | DIASTOLIC BLOOD PRESSURE: 71 MMHG | HEIGHT: 62 IN

## 2018-01-22 VITALS — SYSTOLIC BLOOD PRESSURE: 108 MMHG | DIASTOLIC BLOOD PRESSURE: 70 MMHG | BODY MASS INDEX: 27.98 KG/M2 | WEIGHT: 153 LBS

## 2018-01-22 VITALS — BODY MASS INDEX: 29.26 KG/M2 | DIASTOLIC BLOOD PRESSURE: 70 MMHG | SYSTOLIC BLOOD PRESSURE: 112 MMHG | WEIGHT: 160 LBS

## 2018-01-22 VITALS
BODY MASS INDEX: 28.6 KG/M2 | WEIGHT: 155.4 LBS | DIASTOLIC BLOOD PRESSURE: 69 MMHG | SYSTOLIC BLOOD PRESSURE: 135 MMHG | HEIGHT: 62 IN

## 2018-03-07 NOTE — PROGRESS NOTES
Education  [employer] Education 2nd Trimester:   Second Trimester Education provided:   benefits of breastfeeding, importance of exclusive breastfeeding, early initiation of breastfeeding, exclusive breastfeeding for the first 6 months, non-pharmacologic pain relief methods for labor, rooming-in on 24 hour basis and 28 week packet given  Mother has not registered for prenatal class  Thought has been given to selecting a pediatrician  The mother has not discussed personal preferences with her provider  Prenatal education provided by: Sarahi Castaneda PA-C      Signatures   Electronically signed by : Sarahi Castaneda PAC;  Apr 18 2017  9:23AM EST                       (Author)

## 2018-12-17 ENCOUNTER — OFFICE VISIT (OUTPATIENT)
Dept: URGENT CARE | Facility: CLINIC | Age: 33
End: 2018-12-17
Payer: COMMERCIAL

## 2018-12-17 VITALS
WEIGHT: 127 LBS | HEIGHT: 62 IN | RESPIRATION RATE: 16 BRPM | TEMPERATURE: 98.3 F | SYSTOLIC BLOOD PRESSURE: 121 MMHG | BODY MASS INDEX: 23.37 KG/M2 | DIASTOLIC BLOOD PRESSURE: 79 MMHG | HEART RATE: 85 BPM

## 2018-12-17 DIAGNOSIS — J01.90 ACUTE NON-RECURRENT SINUSITIS, UNSPECIFIED LOCATION: Primary | ICD-10-CM

## 2018-12-17 PROCEDURE — 99213 OFFICE O/P EST LOW 20 MIN: CPT | Performed by: PHYSICIAN ASSISTANT

## 2018-12-17 RX ORDER — PSEUDOEPHEDRINE HCL 60 MG/1
60 TABLET ORAL EVERY 6 HOURS PRN
Qty: 30 TABLET | Refills: 0 | Status: SHIPPED | OUTPATIENT
Start: 2018-12-17 | End: 2019-11-07

## 2018-12-17 RX ORDER — CETIRIZINE HYDROCHLORIDE 10 MG/1
10 TABLET, CHEWABLE ORAL DAILY
COMMUNITY

## 2018-12-17 RX ORDER — AMOXICILLIN AND CLAVULANATE POTASSIUM 875; 125 MG/1; MG/1
1 TABLET, FILM COATED ORAL EVERY 12 HOURS SCHEDULED
Qty: 14 TABLET | Refills: 0 | Status: SHIPPED | OUTPATIENT
Start: 2018-12-17 | End: 2018-12-24

## 2018-12-17 NOTE — PROGRESS NOTES
[facility]        NAME: Donell Stone is a 28 y o  female  : 1985    MRN: [id number]  DATE: 2018  TIME: 5:12 PM    Assessment and Plan   Acute non-recurrent sinusitis, unspecified location [J01 90]  1  Acute non-recurrent sinusitis, unspecified location  amoxicillin-clavulanate (AUGMENTIN) 875-125 mg per tablet    pseudoephedrine (SUDAFED) 60 mg tablet         Patient Instructions   Prescriptions sent to pharmacy for Augmentin and pseudoephedrine-take as directed  Saline nasal spray, cool mist humidifier, Flonase daily  Tylenol/ibuprofen as needed for fever or pain  Follow up with PCP in 3-5 days  Proceed to  ER if symptoms worsen  Chief Complaint     Chief Complaint   Patient presents with    Headache     sinus pressure, eye tearing, PND, sx for 2 weeks, cough         History of Present Illness   The patient is a 14-year-old female who presents with sinus pressure and pain for approximately 2 weeks  She states that initially she started with cold-like symptoms including nasal and sinus congestion, cough, sore throat  All of her symptoms have improved aside from the nasal and sinus congestion  She now has severe frontal and maxillary sinus pain right greater than left  Positive headache  Positive dizziness  Bilateral ear pressure  Negative drainage from her ears  Low-grade fever without chills  Negative cough or shortness of breath  Negative chest pain  Negative abdominal pain, nausea, vomiting or diarrhea  Negative myalgias  For her symptoms she states that she is using an antihistamine and Flonase  HPI    Review of Systems   Review of Systems   Constitutional: Negative for activity change, chills, fatigue and fever  HENT: Positive for congestion, postnasal drip, rhinorrhea, sinus pain and sinus pressure  Negative for ear discharge, ear pain, facial swelling, mouth sores, sneezing, sore throat and trouble swallowing      Eyes: Negative for pain, discharge, redness and itching  Respiratory: Negative for apnea, cough, chest tightness, shortness of breath, wheezing and stridor  Cardiovascular: Negative for chest pain  Gastrointestinal: Negative for abdominal distention, abdominal pain, diarrhea, nausea and vomiting  Genitourinary: Negative for difficulty urinating  Musculoskeletal: Negative for arthralgias and myalgias  Skin: Negative for pallor and rash  Allergic/Immunologic: Negative  Neurological: Positive for dizziness and headaches  Negative for light-headedness  Hematological: Negative  Psychiatric/Behavioral: Negative  All other systems reviewed and are negative  Current Medications       Current Outpatient Prescriptions:     albuterol (PROVENTIL HFA,VENTOLIN HFA) 90 mcg/act inhaler, Inhale 2 puffs every 6 (six) hours as needed for wheezing, Disp: , Rfl:     cetirizine (ZyrTEC) 10 MG chewable tablet, Chew 10 mg daily, Disp: , Rfl:     ibuprofen (MOTRIN) 600 mg tablet, Take 1 tablet by mouth every 6 (six) hours as needed for mild pain, Disp: 30 tablet, Rfl: 0    amoxicillin-clavulanate (AUGMENTIN) 875-125 mg per tablet, Take 1 tablet by mouth every 12 (twelve) hours for 7 days, Disp: 14 tablet, Rfl: 0    docusate sodium (COLACE) 100 mg capsule, Take 1 capsule by mouth 2 (two) times a day (Patient not taking: Reported on 12/17/2018 ), Disp: 10 capsule, Rfl: 0    fluticasone-salmeterol (ADVAIR DISKUS) 100-50 mcg/dose, Inhale 1 puff 2 (two) times a day as needed, Disp: , Rfl:     loratadine (CLARITIN) 10 mg tablet, Take 10 mg by mouth daily  , Disp: , Rfl:     Prenatal Vit-Fe Fumarate-FA (PRENATAL VITAMIN PO), Take 1 tablet by mouth daily, Disp: , Rfl:     pseudoephedrine (SUDAFED) 60 mg tablet, Take 1 tablet (60 mg total) by mouth every 6 (six) hours as needed for congestion, Disp: 30 tablet, Rfl: 0    Current Allergies     Allergies as of 12/17/2018 - Reviewed 12/17/2018   Allergen Reaction Noted    Sulfa antibiotics 03/10/2016            The following portions of the patient's history were reviewed and updated as appropriate: allergies, current medications, past family history, past medical history, past social history, past surgical history and problem list      Past Medical History:   Diagnosis Date    Anxiety     Asthma     Chronic kidney disease     stones    Varicella     childhood       Past Surgical History:   Procedure Laterality Date    [place] SURG RX MISSED ABORTN,1ST TRI N/A 3/11/2016    Procedure: DILATATION AND EVACUATION (D&E); Surgeon: David Cornejo MD;  Location: [facility];  Service: Gynecology       Family History   Problem Relation Age of Onset    Hyperlipidemia Mother     Hyperlipidemia Father     Hypertension Father     Heart disease Father     Cancer Maternal Uncle     Cancer Paternal Uncle     Diabetes Paternal Uncle     Cancer Maternal Grandmother     Vision loss Maternal Grandfather     Heart disease Paternal Grandfather     Diabetes Paternal Uncle          Medications have been verified  Objective   /79 (Patient Position: Sitting)   Pulse 85   Temp 98 3 °F (36 8 °C) (Tympanic)   Resp 16   Ht 5' 2" (1 575 m)   Wt 57 6 kg (127 lb)   BMI 23 23 kg/m²        Physical Exam     Physical Exam   Constitutional: She is oriented to person, place, and time  She appears well-developed and well-nourished  She appears ill  No distress  HENT:   Head: Normocephalic and atraumatic  Right Ear: Hearing, tympanic membrane, external ear and ear canal normal  No drainage or tenderness  Tympanic membrane is not perforated, not erythematous, not retracted and not bulging  No middle ear effusion  No decreased hearing is noted  Left Ear: Hearing, tympanic membrane, external ear and ear canal normal  No drainage or tenderness  Tympanic membrane is not perforated, not erythematous, not retracted and not bulging  No middle ear effusion  No decreased hearing is noted     Nose: Mucosal edema and rhinorrhea present  No septal deviation  Right sinus exhibits maxillary sinus tenderness and frontal sinus tenderness  Left sinus exhibits maxillary sinus tenderness and frontal sinus tenderness  Mouth/Throat: Uvula is midline, oropharynx is clear and moist and mucous membranes are normal  No oral lesions  No dental abscesses or uvula swelling  No oropharyngeal exudate, posterior oropharyngeal edema, posterior oropharyngeal erythema or tonsillar abscesses  Eyes: Pupils are equal, round, and reactive to light  Conjunctivae and EOM are normal    Neck: Trachea normal, normal range of motion and full passive range of motion without pain  Neck supple  No JVD present  No edema and no erythema present  No thyromegaly present  Cardiovascular: Normal rate, regular rhythm, S1 normal, S2 normal, normal heart sounds, intact distal pulses and normal pulses  No murmur heard  Pulmonary/Chest: Effort normal and breath sounds normal  No accessory muscle usage or stridor  No respiratory distress  She has no wheezes  Abdominal: Soft  Normal appearance and bowel sounds are normal  She exhibits no distension  There is no hepatosplenomegaly  There is no tenderness  Musculoskeletal: Normal range of motion  She exhibits no edema  Neurological: She is alert and oriented to person, place, and time  Skin: Skin is warm, dry and intact  No abrasion, no lesion and no rash noted  She is not diaphoretic  No cyanosis or erythema  Nails show no clubbing  Psychiatric: She has a normal mood and affect  Her speech is normal and behavior is normal    Nursing note and vitals reviewed

## 2018-12-17 NOTE — PATIENT INSTRUCTIONS
Prescriptions sent to pharmacy for Augmentin and pseudoephedrine-take as directed  Saline nasal spray, cool mist humidifier, Flonase daily  Tylenol/ibuprofen as needed for fever or pain  Follow up with PCP in 3-5 days  Proceed to  ER if symptoms worsen  Rhinosinusitis   AMBULATORY CARE:   Rhinosinusitis (RS)  is inflammation of your nose and sinuses  It commonly begins as a virus, often as a common cold  Viruses usually last 7 to 10 days and do not need treatment  When the virus does not get better on its own, you may have bacterial RS  This means that bacteria have begun to grow inside your sinuses  Acute RS lasts less than 4 weeks  Chronic RS lasts 12 weeks or more  Recurrent RS is when you have 4 or more episodes of RS in one year  Your signs and symptoms  may be worse when you lie on your back or try to sleep  You may have any of the following:  · Stuffy nose and reduced sense of smell     · Runny nose with thick yellow or green mucus     · Pressure or pain on your face or a headache     · Pain in your teeth or bad breath     · Ear pain or pressure     · Fever or cough     · Tiredness  Seek care immediately if:   · You have double vision or you cannot see  · You have a stiff neck, a fever, or a bad headache  · Your eyeball bulges out or you cannot move your eye  · Your eye and eyelid are red, swollen, and painful  · You cannot open your eye  · You are more sleepy than normal, or you notice changes in your ability to think, move, or talk  · You have swelling of your forehead or scalp  Contact your healthcare provider if:   · Your symptoms are worse or do not improve after 3 to 5 days of treatment  · You have questions or concerns about your condition or care  Treatment for rhinosinusitis  may include any of the following:  · Acetaminophen  decreases pain and fever  It is available without a doctor's order  Ask how much to take and how often to take it  Follow directions  Acetaminophen can cause liver damage if not taken correctly  · NSAIDs , such as ibuprofen, help decrease swelling, pain, and fever  This medicine is available with or without a doctor's order  NSAIDs can cause stomach bleeding or kidney problems in certain people  If you take blood thinner medicine, always ask your healthcare provider if NSAIDs are safe for you  Always read the medicine label and follow directions  · Nasal steroid sprays  decrease inflammation in your nose and sinuses  · Decongestants  reduce swelling and drain mucus in the nose and sinuses  They may help you breathe easier  · Antihistamines  dry mucus in the nose and relieve sneezing  · Antibiotics  treat a bacterial infection and may be needed if your symptoms do not improve or they get worse  · Take your medicine as directed  Contact your healthcare provider if you think your medicine is not helping or if you have side effects  Tell him or her if you are allergic to any medicine  Keep a list of the medicines, vitamins, and herbs you take  Include the amounts, and when and why you take them  Bring the list or the pill bottles to follow-up visits  Carry your medicine list with you in case of an emergency  Self-care:   · Rinse your sinuses  Use a sinus rinse device to rinse your nasal passages with a saline (salt water) solution  This will help thin the mucus in your nose and rinse away pollen and dirt  It will also help reduce swelling so you can breathe normally  Ask your healthcare provider how often to do this  · Breathe in steam   Heat a bowl of water until you see steam  Lean over the bowl and make a tent over your head with a large towel  Breathe deeply for about 20 minutes  Be careful not to get too close to the steam or burn yourself  Do this 3 times a day  You can also breathe deeply when you take a hot shower  · Sleep with your head elevated    Place an extra pillow under your head before you go to sleep to help your sinuses drain  · Drink liquids as directed  Ask your healthcare provider how much liquid to drink each day and which liquids are best for you  Liquids will thin the mucus in your nose and help it drain  Avoid drinks that contain alcohol or caffeine  · Do not smoke, and avoid secondhand smoke  Nicotine and other chemicals in cigarettes and cigars can make your symptoms worse  Ask your healthcare provider for information if you currently smoke and need help to quit  E-cigarettes or smokeless tobacco still contain nicotine  Talk to your healthcare provider before you use these products  Follow up with your healthcare provider as directed: Follow up if your symptoms are worse or not better after 3 to 5 days of treatment  Write down your questions so you remember to ask them during your visits  © 2017 [facility] Information is for End User's use only and may not be sold, redistributed or otherwise used for commercial purposes  All illustrations and images included in CareNotes® are the copyrighted property of [employer]  or [employer]  The above information is an  only  It is not intended as medical advice for individual conditions or treatments  Talk to your doctor, nurse or pharmacist before following any medical regimen to see if it is safe and effective for you

## 2019-03-29 ENCOUNTER — OFFICE VISIT (OUTPATIENT)
Dept: URGENT CARE | Facility: CLINIC | Age: 34
End: 2019-03-29
Payer: COMMERCIAL

## 2019-03-29 VITALS
DIASTOLIC BLOOD PRESSURE: 74 MMHG | TEMPERATURE: 98.7 F | HEART RATE: 55 BPM | BODY MASS INDEX: 22.52 KG/M2 | WEIGHT: 122.4 LBS | OXYGEN SATURATION: 100 % | SYSTOLIC BLOOD PRESSURE: 136 MMHG | HEIGHT: 62 IN | RESPIRATION RATE: 18 BRPM

## 2019-03-29 DIAGNOSIS — J01.00 ACUTE MAXILLARY SINUSITIS, RECURRENCE NOT SPECIFIED: Primary | ICD-10-CM

## 2019-03-29 PROCEDURE — 99213 OFFICE O/P EST LOW 20 MIN: CPT | Performed by: PHYSICIAN ASSISTANT

## 2019-03-29 RX ORDER — AMOXICILLIN AND CLAVULANATE POTASSIUM 875; 125 MG/1; MG/1
1 TABLET, FILM COATED ORAL EVERY 12 HOURS SCHEDULED
Qty: 14 TABLET | Refills: 0 | Status: SHIPPED | OUTPATIENT
Start: 2019-03-29 | End: 2019-04-05

## 2019-03-29 NOTE — PROGRESS NOTES
[facility]        NAME: Nasreen Castro is a 27 y o  female  : 1985    MRN: [id number]  DATE: 2019  TIME: 10:13 AM    Assessment and Plan   Acute maxillary sinusitis, recurrence not specified [J01 00]  1  Acute maxillary sinusitis, recurrence not specified  amoxicillin-clavulanate (AUGMENTIN) 875-125 mg per tablet         Patient Instructions   Prescription sent to the pharmacy for Augmentin-use as directed  Prescription administered in office for Mucinex-use as directed  Saline nasal spray several times daily, cool mist humidifier, Tylenol/ibuprofen as needed for fever or pain  Follow up with PCP in 3-5 days  Proceed to  ER if symptoms worsen  Chief Complaint     Chief Complaint   Patient presents with    Sinusitis     x 1 week          History of Present Illness   The patient is a 26-year-old female who presents with worsening sinus pain and pressure x1 week  She states that initially she started with nasal and sinus congestion that has somewhat improved but now has turned in to facial pain right greater than left  Positive right jaw and neck pain  Positive dizziness  Positive headache  She did have a fever at the start of her symptoms, however, that has resolved  Negative chills  Mild nonproductive cough without shortness of breath or wheezing  Mild sore throat with postnasal drip  Negative abdominal pain, nausea, vomiting or diarrhea  Negative myalgias  She states that she is currently taking pseudoephedrine for her symptoms  HPI    Review of Systems   Review of Systems   Constitutional: Positive for fever  Negative for activity change, chills and fatigue  HENT: Positive for congestion, postnasal drip, rhinorrhea, sinus pressure, sinus pain and sore throat  Negative for ear discharge, ear pain (Bilateral), facial swelling, mouth sores, sneezing and trouble swallowing  Eyes: Negative for pain, discharge, redness and itching     Respiratory: Positive for cough  Negative for apnea, chest tightness, shortness of breath, wheezing and stridor  Cardiovascular: Negative for chest pain  Gastrointestinal: Negative for abdominal distention, abdominal pain, diarrhea, nausea and vomiting  Genitourinary: Negative for difficulty urinating  Musculoskeletal: Negative for arthralgias and myalgias  Skin: Negative for pallor and rash  Allergic/Immunologic: Negative  Neurological: Positive for dizziness and headaches  Negative for light-headedness  Hematological: Negative  Psychiatric/Behavioral: Negative  All other systems reviewed and are negative  Current Medications       Current Outpatient Medications:     albuterol (PROVENTIL HFA,VENTOLIN HFA) 90 mcg/act inhaler, Inhale 2 puffs every 6 (six) hours as needed for wheezing, Disp: , Rfl:     cetirizine (ZyrTEC) 10 MG chewable tablet, Chew 10 mg daily, Disp: , Rfl:     amoxicillin-clavulanate (AUGMENTIN) 875-125 mg per tablet, Take 1 tablet by mouth every 12 (twelve) hours for 7 days, Disp: 14 tablet, Rfl: 0    docusate sodium (COLACE) 100 mg capsule, Take 1 capsule by mouth 2 (two) times a day (Patient not taking: Reported on 12/17/2018 ), Disp: 10 capsule, Rfl: 0    fluticasone-salmeterol (ADVAIR DISKUS) 100-50 mcg/dose, Inhale 1 puff 2 (two) times a day as needed, Disp: , Rfl:     ibuprofen (MOTRIN) 600 mg tablet, Take 1 tablet by mouth every 6 (six) hours as needed for mild pain (Patient not taking: Reported on 3/29/2019), Disp: 30 tablet, Rfl: 0    loratadine (CLARITIN) 10 mg tablet, Take 10 mg by mouth daily  , Disp: , Rfl:     Prenatal Vit-Fe Fumarate-FA (PRENATAL VITAMIN PO), Take 1 tablet by mouth daily, Disp: , Rfl:     pseudoephedrine (SUDAFED) 60 mg tablet, Take 1 tablet (60 mg total) by mouth every 6 (six) hours as needed for congestion (Patient not taking: Reported on 3/29/2019), Disp: 30 tablet, Rfl: 0    Current Allergies     Allergies as of 03/29/2019 - Reviewed 03/29/2019 Allergen Reaction Noted    Sulfa antibiotics  03/10/2016            The following portions of the patient's history were reviewed and updated as appropriate: allergies, current medications, past family history, past medical history, past social history, past surgical history and problem list      Past Medical History:   Diagnosis Date    Anxiety     Asthma     Chronic kidney disease     stones    Varicella     childhood       Past Surgical History:   Procedure Laterality Date    [place] SURG RX MISSED ABORTN,1ST TRI N/A 3/11/2016    Procedure: DILATATION AND EVACUATION (D&E); Surgeon: Kaitlyn Sanchez MD;  Location: [facility];  Service: Gynecology       Family History   Problem Relation Age of Onset    Hyperlipidemia Mother     Hyperlipidemia Father     Hypertension Father     Heart disease Father     Cancer Maternal Uncle     Cancer Paternal Uncle     Diabetes Paternal Uncle     Cancer Maternal Grandmother     Vision loss Maternal Grandfather     Heart disease Paternal Grandfather     Diabetes Paternal Uncle          Medications have been verified  Objective   /74 (BP Location: Right arm, Patient Position: Sitting, Cuff Size: Standard)   Pulse 55   Temp 98 7 °F (37 1 °C) (Temporal)   Resp 18   Ht 5' 2" (1 575 m)   Wt 55 5 kg (122 lb 6 4 oz)   LMP 03/28/2019   SpO2 100%   BMI 22 39 kg/m²        Physical Exam     Physical Exam   Constitutional: She is oriented to person, place, and time  Vital signs are normal  She appears well-developed and well-nourished  Non-toxic appearance  She does not have a sickly appearance  She appears ill  No distress  HENT:   Head: Normocephalic and atraumatic  Right Ear: Hearing, tympanic membrane, external ear and ear canal normal  No drainage or tenderness  Tympanic membrane is not perforated, not erythematous, not retracted and not bulging  No middle ear effusion  No decreased hearing is noted     Left Ear: Hearing, tympanic membrane, external ear and ear canal normal  No drainage or tenderness  Tympanic membrane is not perforated, not erythematous, not retracted and not bulging  No middle ear effusion  No decreased hearing is noted  Nose: Rhinorrhea present  No mucosal edema or septal deviation  Right sinus exhibits maxillary sinus tenderness  Right sinus exhibits no frontal sinus tenderness  Left sinus exhibits maxillary sinus tenderness  Left sinus exhibits no frontal sinus tenderness  Mouth/Throat: Uvula is midline, oropharynx is clear and moist and mucous membranes are normal  No oral lesions  No dental abscesses or uvula swelling  No oropharyngeal exudate, posterior oropharyngeal edema, posterior oropharyngeal erythema or tonsillar abscesses  Eyes: Pupils are equal, round, and reactive to light  Conjunctivae and EOM are normal    Neck: Trachea normal, normal range of motion and full passive range of motion without pain  Neck supple  No JVD present  No edema and no erythema present  No thyromegaly present  Cardiovascular: Normal rate, regular rhythm, S1 normal, S2 normal, normal heart sounds, intact distal pulses and normal pulses  No murmur heard  Pulmonary/Chest: Effort normal and breath sounds normal  No accessory muscle usage or stridor  No respiratory distress  She has no decreased breath sounds  She has no wheezes  She has no rhonchi  She has no rales  Abdominal: Soft  Normal appearance and bowel sounds are normal  She exhibits no distension  There is no hepatosplenomegaly  There is no tenderness  Musculoskeletal: Normal range of motion  She exhibits no edema  Neurological: She is alert and oriented to person, place, and time  Skin: Skin is warm, dry and intact  No abrasion, no lesion and no rash noted  She is not diaphoretic  No cyanosis or erythema  Nails show no clubbing  Psychiatric: She has a normal mood and affect  Her speech is normal and behavior is normal    Nursing note and vitals reviewed

## 2019-03-29 NOTE — PATIENT INSTRUCTIONS
Prescription sent to the pharmacy for Augmentin-use as directed  Prescription administered in office for Mucinex-use as directed  Saline nasal spray several times daily, cool mist humidifier, Tylenol/ibuprofen as needed for fever or pain  Follow up with PCP in 3-5 days  Proceed to  ER if symptoms worsen  Rhinosinusitis   AMBULATORY CARE:   Rhinosinusitis (RS)  is inflammation of your nose and sinuses  It commonly begins as a virus, often as a common cold  Viruses usually last 7 to 10 days and do not need treatment  When the virus does not get better on its own, you may have bacterial RS  This means that bacteria have begun to grow inside your sinuses  Acute RS lasts less than 4 weeks  Chronic RS lasts 12 weeks or more  Recurrent RS is when you have 4 or more episodes of RS in one year  Your signs and symptoms  may be worse when you lie on your back or try to sleep  You may have any of the following:  · Stuffy nose and reduced sense of smell     · Runny nose with thick yellow or green mucus     · Pressure or pain on your face or a headache     · Pain in your teeth or bad breath     · Ear pain or pressure     · Fever or cough     · Tiredness  Seek care immediately if:   · You have double vision or you cannot see  · You have a stiff neck, a fever, or a bad headache  · Your eyeball bulges out or you cannot move your eye  · Your eye and eyelid are red, swollen, and painful  · You cannot open your eye  · You are more sleepy than normal, or you notice changes in your ability to think, move, or talk  · You have swelling of your forehead or scalp  Contact your healthcare provider if:   · Your symptoms are worse or do not improve after 3 to 5 days of treatment  · You have questions or concerns about your condition or care  Treatment for rhinosinusitis  may include any of the following:  · Acetaminophen  decreases pain and fever  It is available without a doctor's order   Ask how much to take and how often to take it  Follow directions  Acetaminophen can cause liver damage if not taken correctly  · NSAIDs , such as ibuprofen, help decrease swelling, pain, and fever  This medicine is available with or without a doctor's order  NSAIDs can cause stomach bleeding or kidney problems in certain people  If you take blood thinner medicine, always ask your healthcare provider if NSAIDs are safe for you  Always read the medicine label and follow directions  · Nasal steroid sprays  decrease inflammation in your nose and sinuses  · Decongestants  reduce swelling and drain mucus in the nose and sinuses  They may help you breathe easier  · Antihistamines  dry mucus in the nose and relieve sneezing  · Antibiotics  treat a bacterial infection and may be needed if your symptoms do not improve or they get worse  · Take your medicine as directed  Contact your healthcare provider if you think your medicine is not helping or if you have side effects  Tell him or her if you are allergic to any medicine  Keep a list of the medicines, vitamins, and herbs you take  Include the amounts, and when and why you take them  Bring the list or the pill bottles to follow-up visits  Carry your medicine list with you in case of an emergency  Self-care:   · Rinse your sinuses  Use a sinus rinse device to rinse your nasal passages with a saline (salt water) solution  This will help thin the mucus in your nose and rinse away pollen and dirt  It will also help reduce swelling so you can breathe normally  Ask your healthcare provider how often to do this  · Breathe in steam   Heat a bowl of water until you see steam  Lean over the bowl and make a tent over your head with a large towel  Breathe deeply for about 20 minutes  Be careful not to get too close to the steam or burn yourself  Do this 3 times a day  You can also breathe deeply when you take a hot shower  · Sleep with your head elevated    Place an extra pillow under your head before you go to sleep to help your sinuses drain  · Drink liquids as directed  Ask your healthcare provider how much liquid to drink each day and which liquids are best for you  Liquids will thin the mucus in your nose and help it drain  Avoid drinks that contain alcohol or caffeine  · Do not smoke, and avoid secondhand smoke  Nicotine and other chemicals in cigarettes and cigars can make your symptoms worse  Ask your healthcare provider for information if you currently smoke and need help to quit  E-cigarettes or smokeless tobacco still contain nicotine  Talk to your healthcare provider before you use these products  Follow up with your healthcare provider as directed: Follow up if your symptoms are worse or not better after 3 to 5 days of treatment  Write down your questions so you remember to ask them during your visits  © 2017 [employer] Information is for End User's use only and may not be sold, redistributed or otherwise used for commercial purposes  All illustrations and images included in CareNotes® are the copyrighted property of [employer]  or [employer]  The above information is an  only  It is not intended as medical advice for individual conditions or treatments  Talk to your doctor, nurse or pharmacist before following any medical regimen to see if it is safe and effective for you

## 2019-11-04 ENCOUNTER — OFFICE VISIT (OUTPATIENT)
Dept: URGENT CARE | Facility: CLINIC | Age: 34
End: 2019-11-04
Payer: COMMERCIAL

## 2019-11-04 VITALS
WEIGHT: 122 LBS | BODY MASS INDEX: 22.45 KG/M2 | RESPIRATION RATE: 18 BRPM | OXYGEN SATURATION: 100 % | TEMPERATURE: 97.9 F | HEART RATE: 62 BPM | HEIGHT: 62 IN | SYSTOLIC BLOOD PRESSURE: 131 MMHG | DIASTOLIC BLOOD PRESSURE: 75 MMHG

## 2019-11-04 DIAGNOSIS — J01.00 ACUTE NON-RECURRENT MAXILLARY SINUSITIS: Primary | ICD-10-CM

## 2019-11-04 PROCEDURE — 99213 OFFICE O/P EST LOW 20 MIN: CPT | Performed by: FAMILY MEDICINE

## 2019-11-04 RX ORDER — AMOXICILLIN AND CLAVULANATE POTASSIUM 875; 125 MG/1; MG/1
1 TABLET, FILM COATED ORAL EVERY 12 HOURS SCHEDULED
Qty: 20 TABLET | Refills: 0 | Status: SHIPPED | COMMUNITY
Start: 2019-11-04 | End: 2019-11-14

## 2019-11-04 NOTE — PROGRESS NOTES
[facility]        NAME: Jorge Hoyos is a 27 y o  female  : 1985    MRN: [id number]  DATE: 2019  TIME: 5:13 PM    Assessment and Plan   Acute non-recurrent maxillary sinusitis [J01 00]  1  Acute non-recurrent maxillary sinusitis  amoxicillin-clavulanate (AUGMENTIN) 875-125 mg per tablet         Patient Instructions     Sinus and eustachian tube/ear massage as demonstrated, every 2-3 hours  Continue Sudafed, or you can try switching to plain Mucinex or plain guaifenesin  Follow up with PCP in 3-5 days  Proceed to  ER if symptoms worsen  Chief Complaint     Chief Complaint   Patient presents with    Cough     Sinus congestion x 1 week  Sore throat, shortness of breath, cough x 3 days  Taking nasal decongestant and day quil          History of Present Illness       Cough (Sinus congestion x 1 week  Sore throat, shortness of breath, cough x 3 days  Taking nasal decongestant and day quil )    Cough   Associated symptoms include a sore throat  Sinusitis   This is a new problem  The current episode started 1 to 4 weeks ago  The problem has been gradually worsening since onset  There has been no fever  The pain is moderate  Associated symptoms include congestion, coughing, sinus pressure and a sore throat  Review of Systems   Review of Systems   Constitutional: Negative  HENT: Positive for congestion, sinus pressure and sore throat  Respiratory: Positive for cough  Cardiovascular: Negative            Current Medications       Current Outpatient Medications:     albuterol (PROVENTIL HFA,VENTOLIN HFA) 90 mcg/act inhaler, Inhale 2 puffs every 6 (six) hours as needed for wheezing, Disp: , Rfl:     cetirizine (ZyrTEC) 10 MG chewable tablet, Chew 10 mg daily, Disp: , Rfl:     ibuprofen (MOTRIN) 600 mg tablet, Take 1 tablet by mouth every 6 (six) hours as needed for mild pain, Disp: 30 tablet, Rfl: 0    pseudoephedrine (SUDAFED) 60 mg tablet, Take 1 tablet (60 mg total) by mouth every 6 (six) hours as needed for congestion, Disp: 30 tablet, Rfl: 0    amoxicillin-clavulanate (AUGMENTIN) 875-125 mg per tablet, Take 1 tablet by mouth every 12 (twelve) hours for 10 days, Disp: 20 tablet, Rfl: 0    docusate sodium (COLACE) 100 mg capsule, Take 1 capsule by mouth 2 (two) times a day (Patient not taking: Reported on 12/17/2018 ), Disp: 10 capsule, Rfl: 0    fluticasone-salmeterol (ADVAIR DISKUS) 100-50 mcg/dose, Inhale 1 puff 2 (two) times a day as needed, Disp: , Rfl:     loratadine (CLARITIN) 10 mg tablet, Take 10 mg by mouth daily  , Disp: , Rfl:     Prenatal Vit-Fe Fumarate-FA (PRENATAL VITAMIN PO), Take 1 tablet by mouth daily, Disp: , Rfl:     Current Allergies     Allergies as of 11/04/2019 - Reviewed 11/04/2019   Allergen Reaction Noted    Sulfa antibiotics  03/10/2016            The following portions of the patient's history were reviewed and updated as appropriate: allergies, current medications, past family history, past medical history, past social history, past surgical history and problem list      Past Medical History:   Diagnosis Date    Anxiety     Asthma     Chronic kidney disease     stones    Varicella     childhood       Past Surgical History:   Procedure Laterality Date    [facility] SURG RX MISSED ABORTN,1ST TRI N/A 3/11/2016    Procedure: DILATATION AND EVACUATION (D&E); Surgeon: Jill Vaca MD;  Location: [facility];  Service: Gynecology       Family History   Problem Relation Age of Onset    Hyperlipidemia Mother     Hyperlipidemia Father     Hypertension Father     Heart disease Father     Cancer Maternal Uncle     Cancer Paternal Uncle     Diabetes Paternal Uncle     Cancer Maternal Grandmother     Vision loss Maternal Grandfather     Heart disease Paternal Grandfather     Diabetes Paternal Uncle          Medications have been verified          Objective   /75   Pulse 62   Temp 97 9 °F (36 6 °C) (Oral)   Resp 18   Ht 5' 2" (1 575 m) Wt 55 3 kg (122 lb)   LMP 11/01/2019   SpO2 100%   BMI 22 31 kg/m²        Physical Exam     Physical Exam   Constitutional: She is oriented to person, place, and time  She appears well-developed and well-nourished  She appears ill  HENT:   Right Ear: External ear normal    Left Ear: External ear normal    Nose: Right sinus exhibits maxillary sinus tenderness  Left sinus exhibits maxillary sinus tenderness  Mouth/Throat: Oropharynx is clear and moist  No oropharyngeal exudate  Eyes: Conjunctivae are normal    Neck: Normal range of motion  Neck supple  Cardiovascular: Normal rate, regular rhythm and normal heart sounds  No murmur heard  Pulmonary/Chest: Effort normal and breath sounds normal  No respiratory distress  She has no wheezes  She has no rales  She exhibits no tenderness  Abdominal: Soft  Musculoskeletal: Normal range of motion  Lymphadenopathy:     She has no cervical adenopathy  Neurological: She is alert and oriented to person, place, and time  Skin: Skin is warm  No rash noted  No erythema

## 2019-11-04 NOTE — PATIENT INSTRUCTIONS
Sinus and eustachian tube/ear massage as demonstrated, every 2-3 hours  Continue Sudafed, or you can try switching to plain Mucinex or plain guaifenesin  Follow up with PCP in 3-5 days  Proceed to  ER if symptoms worsen  Sinusitis   AMBULATORY CARE:   Sinusitis  is inflammation or infection of your sinuses  It is most often caused by a virus  Acute sinusitis may last up to 12 weeks  Chronic sinusitis lasts longer than 12 weeks  Recurrent sinusitis means you have 4 or more times in 1 year  Common symptoms include the following:   · Fever    · Pain, pressure, redness, or swelling around the forehead, cheeks, or eyes    · Thick yellow or green discharge from your nose    · Tenderness when you touch your face over your sinuses    · Dry cough that happens mostly at night or when you lie down    · Headache and face pain that is worse when you lean forward    · Tooth pain, or pain when you chew  Seek care immediately if:   · Your eye and eyelid are red, swollen, and painful  · You cannot open your eye  · You have vision changes, such as double vision  · Your eyeball bulges out or you cannot move your eye  · You are more sleepy than normal, or you notice changes in your ability to think, move, or talk  · You have a stiff neck, a fever, or a bad headache  · You have swelling of your forehead or scalp  Contact your healthcare provider if:   · Your symptoms do not improve after 3 days  · Your symptoms do not go away after 10 days  · You have nausea and are vomiting  · Your nose is bleeding  · You have questions or concerns about your condition or care  Treatment for sinusitis:  Your symptoms may go away on their own  Your healthcare provider may recommend watchful waiting for up to 10 days before starting antibiotics  You may  need any of the following:  · Acetaminophen  decreases pain and fever  It is available without a doctor's order  Ask how much to take and how often to take it  Follow directions  Read the labels of all other medicines you are using to see if they also contain acetaminophen, or ask your doctor or pharmacist  Acetaminophen can cause liver damage if not taken correctly  Do not use more than 4 grams (4,000 milligrams) total of acetaminophen in one day  · NSAIDs , such as ibuprofen, help decrease swelling, pain, and fever  This medicine is available with or without a doctor's order  NSAIDs can cause stomach bleeding or kidney problems in certain people  If you take blood thinner medicine, always ask your healthcare provider if NSAIDs are safe for you  Always read the medicine label and follow directions  · Nasal steroid sprays  may help decrease inflammation in your nose and sinuses  · Decongestants  help reduce swelling and drain mucus in the nose and sinuses  They may help you breathe easier  · Antihistamines  help dry mucus in the nose and relieve sneezing  · Antibiotics  help treat or prevent a bacterial infection  · Take your medicine as directed  Contact your healthcare provider if you think your medicine is not helping or if you have side effects  Tell him or her if you are allergic to any medicine  Keep a list of the medicines, vitamins, and herbs you take  Include the amounts, and when and why you take them  Bring the list or the pill bottles to follow-up visits  Carry your medicine list with you in case of an emergency  Self-care:   · Rinse your sinuses  Use a sinus rinse device to rinse your nasal passages with a saline (salt water) solution or distilled water  Do not use tap water  This will help thin the mucus in your nose and rinse away pollen and dirt  It will also help reduce swelling so you can breathe normally  Ask your healthcare provider how often to do this  · Breathe in steam   Heat a bowl of water until you see steam  Lean over the bowl and make a tent over your head with a large towel  Breathe deeply for about 20 minutes   Be careful not to get too close to the steam or burn yourself  Do this 3 times a day  You can also breathe deeply when you take a hot shower  · Sleep with your head elevated  Place an extra pillow under your head before you go to sleep to help your sinuses drain  · Drink liquids as directed  Ask your healthcare provider how much liquid to drink each day and which liquids are best for you  Liquids will thin the mucus in your nose and help it drain  Avoid drinks that contain alcohol or caffeine  · Do not smoke, and avoid secondhand smoke  Nicotine and other chemicals in cigarettes and cigars can make your symptoms worse  Ask your healthcare provider for information if you currently smoke and need help to quit  E-cigarettes or smokeless tobacco still contain nicotine  Talk to your healthcare provider before you use these products  Prevent the spread of germs that cause sinusitis:  Wash your hands often with soap and water  Wash your hands after you use the bathroom, change a child's diaper, or sneeze  Wash your hands before you prepare or eat food  Follow up with your healthcare provider as directed: You may be referred to an ear, nose, and throat specialist  Write down your questions so you remember to ask them during your visits  © 2017 [facility] Information is for End User's use only and may not be sold, redistributed or otherwise used for commercial purposes  All illustrations and images included in CareNotes® are the copyrighted property of [employer]  or [employer]  The above information is an  only  It is not intended as medical advice for individual conditions or treatments  Talk to your doctor, nurse or pharmacist before following any medical regimen to see if it is safe and effective for you

## 2019-11-07 ENCOUNTER — OFFICE VISIT (OUTPATIENT)
Dept: FAMILY MEDICINE CLINIC | Facility: CLINIC | Age: 34
End: 2019-11-07
Payer: COMMERCIAL

## 2019-11-07 VITALS
DIASTOLIC BLOOD PRESSURE: 66 MMHG | HEIGHT: 62 IN | SYSTOLIC BLOOD PRESSURE: 110 MMHG | BODY MASS INDEX: 22.63 KG/M2 | TEMPERATURE: 97.6 F | OXYGEN SATURATION: 99 % | WEIGHT: 123 LBS | HEART RATE: 65 BPM | RESPIRATION RATE: 16 BRPM

## 2019-11-07 DIAGNOSIS — Z00.00 ANNUAL PHYSICAL EXAM: Primary | ICD-10-CM

## 2019-11-07 DIAGNOSIS — Z13.6 SCREENING FOR CARDIOVASCULAR CONDITION: ICD-10-CM

## 2019-11-07 DIAGNOSIS — J45.20 MILD INTERMITTENT ASTHMA WITHOUT COMPLICATION: ICD-10-CM

## 2019-11-07 DIAGNOSIS — Z13.1 SCREENING FOR DIABETES MELLITUS: ICD-10-CM

## 2019-11-07 DIAGNOSIS — J30.1 ALLERGIC RHINITIS DUE TO POLLEN, UNSPECIFIED SEASONALITY: ICD-10-CM

## 2019-11-07 PROBLEM — Z3A.40 40 WEEKS GESTATION OF PREGNANCY: Status: RESOLVED | Noted: 2017-06-29 | Resolved: 2019-11-07

## 2019-11-07 PROCEDURE — 99385 PREV VISIT NEW AGE 18-39: CPT | Performed by: FAMILY MEDICINE

## 2019-11-07 NOTE — PATIENT INSTRUCTIONS

## 2019-11-07 NOTE — PROGRESS NOTES
[address]    NAME: Jonathon Pagan  AGE: 27 y o  SEX: female  : 1985     DATE: 2019     Assessment and Plan:     Problem List Items Addressed This Visit        Respiratory    Asthma     Her asthma is stable she has mild intermittent asthma, she is advised to have pneumonia vaccine once her cough is resolved and with her next visit         Allergic rhinitis     She use antihistamine intermittently            Other    Screening for cardiovascular condition - Primary    Relevant Orders    Lipid panel    CBC and Platelet    Comprehensive metabolic panel    TSH, 3rd generation       She has recently treated for sinusitis and currently taking Augmentin, she is still coughing, her lung exam is clear advised to continue Augmentin and Robitussin DM for the cough    Immunizations and preventive care screenings were discussed with patient today  Appropriate education was printed on patient's after visit summary  Counseling:  Alcohol/drug use: discussed moderation in alcohol intake, the recommendations for healthy alcohol use, and avoidance of illicit drug use  Dental Health: discussed importance of regular tooth brushing, flossing, and dental visits  Sexual health: discussed sexually transmitted diseases, partner selection, use of condoms, avoidance of unintended pregnancy, and contraceptive alternatives  · Exercise: the importance of regular exercise/physical activity was discussed  Recommend exercise 3-5 times per week for at least 30 minutes  No follow-ups on file  Chief Complaint:     Chief Complaint   Patient presents with    Annual Exam      History of Present Illness:     Adult Annual Physical   Patient here for a comprehensive physical exam  The patient reports problems - mild intermittent asthma , stable   Diet and Physical Activity  · Diet/Nutrition: well balanced diet     · Exercise: moderate cardiovascular exercise  Depression Screening  PHQ-9 Depression Screening    PHQ-9:    Frequency of the following problems over the past two weeks:       Little interest or pleasure in doing things:  0 - not at all  Feeling down, depressed, or hopeless:  0 - not at all  PHQ-2 Score:  0       General Health  · Sleep: sleeps well  · Hearing: normal - bilateral   · Vision: no vision problems  · Dental: regular dental visits  /GYN Health  · Last menstrual period: regular periods     · History of STDs?: no      Review of Systems:     Review of Systems   Constitutional: Negative for activity change, appetite change, chills, diaphoresis, fatigue, fever and unexpected weight change  HENT: Positive for congestion  Negative for dental problem, ear discharge, ear pain, facial swelling, hearing loss, mouth sores, nosebleeds, postnasal drip, rhinorrhea, sinus pressure, sinus pain, sneezing, sore throat, trouble swallowing and voice change  Eyes: Negative for photophobia, pain, discharge, redness and itching  Respiratory: Positive for cough  Negative for chest tightness, shortness of breath and wheezing  Cardiovascular: Negative for chest pain, palpitations and leg swelling  Gastrointestinal: Negative for abdominal distention, abdominal pain, blood in stool, constipation, diarrhea and nausea  Endocrine: Negative for cold intolerance, heat intolerance, polydipsia, polyphagia and polyuria  Genitourinary: Negative for dysuria, flank pain, frequency, hematuria and urgency  Musculoskeletal: Negative for arthralgias, back pain, myalgias and neck pain  Skin: Negative for color change and pallor  Allergic/Immunologic: Negative for environmental allergies and food allergies  Neurological: Negative for dizziness, weakness, light-headedness, numbness and headaches  Hematological: Negative for adenopathy  Does not bruise/bleed easily     Psychiatric/Behavioral: Negative for behavioral problems, sleep disturbance and suicidal ideas  The patient is not nervous/anxious  Past Medical History:     Past Medical History:   Diagnosis Date    Anxiety     Asthma     Chronic kidney disease     stones    Varicella     childhood      Past Surgical History:     Past Surgical History:   Procedure Laterality Date    [facility] RX MISSED ABORTN,1ST TRI N/A 3/11/2016    Procedure: DILATATION AND EVACUATION (D&E);   Surgeon: Drake Willams MD;  Location: [facility];  Service: Gynecology      Social History:     Social History     Socioeconomic History    Marital status: /Civil Union     Spouse name: Not on file    Number of children: Not on file    Years of education: Not on file    Highest education level: Not on file   Occupational History    Not on file   Social Needs    Financial resource strain: Not on file    Food insecurity:     Worry: Not on file     Inability: Not on file    Transportation needs:     Medical: Not on file     Non-medical: Not on file   Tobacco Use    Smoking status: Never Smoker    Smokeless tobacco: Never Used   Substance and Sexual Activity    Alcohol use: No    Drug use: No    Sexual activity: Yes     Partners: Male   Lifestyle    Physical activity:     Days per week: Not on file     Minutes per session: Not on file    Stress: Not on file   Relationships    Social connections:     Talks on phone: Not on file     Gets together: Not on file     Attends Jainism service: Not on file     Active member of club or organization: Not on file     Attends meetings of clubs or organizations: Not on file     Relationship status: Not on file    Intimate partner violence:     Fear of current or ex partner: Not on file     Emotionally abused: Not on file     Physically abused: Not on file     Forced sexual activity: Not on file   Other Topics Concern    Not on file   Social History Narrative    Not on file      Family History:     Family History   Problem Relation Age of Onset    Hyperlipidemia Mother     Hyperlipidemia Father     Hypertension Father     Heart disease Father     Cancer Maternal Uncle     Cancer Paternal Uncle     Diabetes Paternal Uncle     Cancer Maternal Grandmother     Vision loss Maternal Grandfather     Heart disease Paternal Grandfather     Diabetes Paternal Uncle       Current Medications:     Current Outpatient Medications   Medication Sig Dispense Refill    albuterol (PROVENTIL HFA,VENTOLIN HFA) 90 mcg/act inhaler Inhale 2 puffs every 6 (six) hours as needed for wheezing      amoxicillin-clavulanate (AUGMENTIN) 875-125 mg per tablet Take 1 tablet by mouth every 12 (twelve) hours for 10 days 20 tablet 0    cetirizine (ZyrTEC) 10 MG chewable tablet Chew 10 mg daily       No current facility-administered medications for this visit  Allergies: Allergies   Allergen Reactions    Sulfa Antibiotics      Unknown, childhood alllergy      Physical Exam:     /66   Pulse 65   Temp 97 6 °F (36 4 °C)   Resp 16   Ht 5' 2" (1 575 m)   Wt 55 8 kg (123 lb)   LMP 11/01/2019   SpO2 99%   BMI 22 50 kg/m²     Physical Exam   Constitutional: She is oriented to person, place, and time  She appears well-developed and well-nourished  HENT:   Head: Normocephalic and atraumatic  Right Ear: External ear normal    Left Ear: External ear normal    Nose: Nose normal    Mouth/Throat: Oropharynx is clear and moist  No oropharyngeal exudate  Eyes: Pupils are equal, round, and reactive to light  Conjunctivae and EOM are normal  Right eye exhibits no discharge  Left eye exhibits no discharge  No scleral icterus  Neck: Normal range of motion  Neck supple  No tracheal deviation present  No thyromegaly present  Cardiovascular: Normal rate, regular rhythm and normal heart sounds  No murmur heard  Pulmonary/Chest: Effort normal and breath sounds normal  No respiratory distress  She has no wheezes  She has no rales  Abdominal: Soft   Bowel sounds are normal  She exhibits no distension and no mass  There is no tenderness  There is no rebound  Musculoskeletal: Normal range of motion  She exhibits no edema  Lymphadenopathy:     She has no cervical adenopathy  Neurological: She is alert and oriented to person, place, and time  She has normal reflexes  No cranial nerve deficit  Skin: Skin is warm  No rash noted  No erythema  No pallor  Psychiatric: She has a normal mood and affect  Her behavior is normal  Judgment and thought content normal    Nursing note and vitals reviewed        Carli Adames MD   [place] [address]

## 2019-11-07 NOTE — ASSESSMENT & PLAN NOTE
Her asthma is stable she has mild intermittent asthma, she is advised to have pneumonia vaccine once her cough is resolved and with her next visit

## 2020-02-13 ENCOUNTER — OFFICE VISIT (OUTPATIENT)
Dept: URGENT CARE | Facility: CLINIC | Age: 35
End: 2020-02-13
Payer: COMMERCIAL

## 2020-02-13 VITALS
HEART RATE: 80 BPM | BODY MASS INDEX: 22.82 KG/M2 | WEIGHT: 124 LBS | TEMPERATURE: 99.1 F | RESPIRATION RATE: 20 BRPM | SYSTOLIC BLOOD PRESSURE: 119 MMHG | HEIGHT: 62 IN | OXYGEN SATURATION: 99 % | DIASTOLIC BLOOD PRESSURE: 62 MMHG

## 2020-02-13 DIAGNOSIS — J01.00 ACUTE NON-RECURRENT MAXILLARY SINUSITIS: Primary | ICD-10-CM

## 2020-02-13 PROCEDURE — 99213 OFFICE O/P EST LOW 20 MIN: CPT | Performed by: NURSE PRACTITIONER

## 2020-02-13 RX ORDER — AMOXICILLIN AND CLAVULANATE POTASSIUM 875; 125 MG/1; MG/1
1 TABLET, FILM COATED ORAL EVERY 12 HOURS SCHEDULED
Qty: 14 TABLET | Refills: 0 | Status: SHIPPED | OUTPATIENT
Start: 2020-02-13 | End: 2020-02-20

## 2020-02-13 NOTE — PATIENT INSTRUCTIONS
Augmentin twice a day for 7 days  Flonase 1 spray each nostril daily  Saline nasal spray as directed to rinse sinus passages  Delsym or robitussin for cough  Pseudoephedrine 1-2 tablets every 6 hours as needed for congestion  Increase your fluid intake  Tylenol and/or Motrin as needed for pain or fever  Follow up with your PCP for worsening or concerning symptoms    Sinusitis   WHAT YOU NEED TO KNOW:   Sinusitis is inflammation or infection of your sinuses  It is most often caused by a virus  Acute sinusitis may last up to 12 weeks  Chronic sinusitis lasts longer than 12 weeks  Recurrent sinusitis means you have 4 or more times in 1 year  DISCHARGE INSTRUCTIONS:   Return to the emergency department if:   · Your eye and eyelid are red, swollen, and painful  · You cannot open your eye  · You have vision changes, such as double vision  · Your eyeball bulges out or you cannot move your eye  · You are more sleepy than normal, or you notice changes in your ability to think, move, or talk  · You have a stiff neck, a fever, or a bad headache  · You have swelling of your forehead or scalp  Contact your healthcare provider if:   · Your symptoms do not improve after 3 days  · Your symptoms do not go away after 10 days  · You have nausea and are vomiting  · Your nose is bleeding  · You have questions or concerns about your condition or care  Medicines: Your symptoms may go away on their own  Your healthcare provider may recommend watchful waiting for up to 10 days before starting antibiotics  You may  need any of the following:  · Acetaminophen  decreases pain and fever  It is available without a doctor's order  Ask how much to take and how often to take it  Follow directions  Read the labels of all other medicines you are using to see if they also contain acetaminophen, or ask your doctor or pharmacist  Acetaminophen can cause liver damage if not taken correctly   Do not use more than 4 grams (4,000 milligrams) total of acetaminophen in one day  · NSAIDs , such as ibuprofen, help decrease swelling, pain, and fever  This medicine is available with or without a doctor's order  NSAIDs can cause stomach bleeding or kidney problems in certain people  If you take blood thinner medicine, always ask your healthcare provider if NSAIDs are safe for you  Always read the medicine label and follow directions  · Nasal steroid sprays  may help decrease inflammation in your nose and sinuses  · Decongestants  help reduce swelling and drain mucus in the nose and sinuses  They may help you breathe easier  · Antihistamines  help dry mucus in the nose and relieve sneezing  · Antibiotics  help treat or prevent a bacterial infection  · Take your medicine as directed  Contact your healthcare provider if you think your medicine is not helping or if you have side effects  Tell him or her if you are allergic to any medicine  Keep a list of the medicines, vitamins, and herbs you take  Include the amounts, and when and why you take them  Bring the list or the pill bottles to follow-up visits  Carry your medicine list with you in case of an emergency  Self-care:   · Rinse your sinuses  Use a sinus rinse device to rinse your nasal passages with a saline (salt water) solution or distilled water  Do not use tap water  This will help thin the mucus in your nose and rinse away pollen and dirt  It will also help reduce swelling so you can breathe normally  Ask your healthcare provider how often to do this  · Breathe in steam   Heat a bowl of water until you see steam  Lean over the bowl and make a tent over your head with a large towel  Breathe deeply for about 20 minutes  Be careful not to get too close to the steam or burn yourself  Do this 3 times a day  You can also breathe deeply when you take a hot shower  · Sleep with your head elevated    Place an extra pillow under your head before you go to sleep to help your sinuses drain  · Drink liquids as directed  Ask your healthcare provider how much liquid to drink each day and which liquids are best for you  Liquids will thin the mucus in your nose and help it drain  Avoid drinks that contain alcohol or caffeine  · Do not smoke, and avoid secondhand smoke  Nicotine and other chemicals in cigarettes and cigars can make your symptoms worse  Ask your healthcare provider for information if you currently smoke and need help to quit  E-cigarettes or smokeless tobacco still contain nicotine  Talk to your healthcare provider before you use these products  Prevent the spread of germs that cause sinusitis:  Wash your hands often with soap and water  Wash your hands after you use the bathroom, change a child's diaper, or sneeze  Wash your hands before you prepare or eat food  Follow up with your healthcare provider as directed: You may be referred to an ear, nose, and throat specialist  Write down your questions so you remember to ask them during your visits  © 2017 [facility] Information is for End User's use only and may not be sold, redistributed or otherwise used for commercial purposes  All illustrations and images included in CareNotes® are the copyrighted property of [employer] , [employer]  or [employer]  The above information is an  only  It is not intended as medical advice for individual conditions or treatments  Talk to your doctor, nurse or pharmacist before following any medical regimen to see if it is safe and effective for you

## 2020-02-13 NOTE — LETTER
February 13, 2020     Patient: Kwaku Campos   YOB: 1985   Date of Visit: 2/13/2020       To Whom it May Concern:    Kwaku Campos was seen in my clinic on 2/13/2020  She may return to work on 2/14/2020  If you have any questions or concerns, please don't hesitate to call           Sincerely,          KARON Vinson      CC: Kwaku Campos

## 2020-02-14 NOTE — PROGRESS NOTES
[facility]        NAME: Cheryle Chase is a 27 y o  female  : 1985    MRN: [id number]  DATE: 2020  TIME: 7:04 PM    Assessment and Plan   Acute non-recurrent maxillary sinusitis [J01 00]  1  Acute non-recurrent maxillary sinusitis  amoxicillin-clavulanate (AUGMENTIN) 875-125 mg per tablet         Patient Instructions   Augmentin twice a day for 7 days  Flonase 1 spray each nostril daily  Saline nasal spray as directed to rinse sinus passages  Delsym or robitussin for cough  Pseudoephedrine 1-2 tablets every 6 hours as needed for congestion  Increase your fluid intake  Tylenol and/or Motrin as needed for pain or fever  Follow up with your PCP for worsening or concerning symptoms    Follow up with PCP in 3-5 days  Proceed to  ER if symptoms worsen  Chief Complaint     Chief Complaint   Patient presents with    Cold Like Symptoms     sinus pain and congestion, cough, sx started a few days ago  History of Present Illness       Patient is a 39-year-old female presenting with sinus pressure and congestion for the past 7 days  Associated with postnasal drip and dry cough  Denies fever or chills  Denies ear pain or sore throat  She has been using saline spray, Sudafed, and Mucinex with minimal improvement  Review of Systems   Review of Systems   Constitutional: Negative for activity change, chills and fever  HENT: Positive for congestion, dental problem (upper dental pressure), postnasal drip, sinus pressure and sinus pain  Negative for sneezing and sore throat  Gastrointestinal: Negative for abdominal pain, diarrhea and vomiting  Skin: Negative for rash  Neurological: Negative for headaches           Current Medications       Current Outpatient Medications:     albuterol (PROVENTIL HFA,VENTOLIN HFA) 90 mcg/act inhaler, Inhale 2 puffs every 6 (six) hours as needed for wheezing, Disp: , Rfl:     amoxicillin-clavulanate (AUGMENTIN) 875-125 mg per tablet, Take 1 tablet by mouth every 12 (twelve) hours for 7 days, Disp: 14 tablet, Rfl: 0    cetirizine (ZyrTEC) 10 MG chewable tablet, Chew 10 mg daily, Disp: , Rfl:     Current Allergies     Allergies as of 02/13/2020 - Reviewed 02/13/2020   Allergen Reaction Noted    Sulfa antibiotics  03/10/2016            The following portions of the patient's history were reviewed and updated as appropriate: allergies, current medications, past family history, past medical history, past social history, past surgical history and problem list      Past Medical History:   Diagnosis Date    Anxiety     Asthma     Chronic kidney disease     stones    Varicella     childhood       Past Surgical History:   Procedure Laterality Date    [facility] SURG RX MISSED ABORTN,1ST TRI N/A 3/11/2016    Procedure: DILATATION AND EVACUATION (D&E); Surgeon: Beatrice Nicole MD;  Location: [facility];  Service: Gynecology       Family History   Problem Relation Age of Onset    Hyperlipidemia Mother     Hyperlipidemia Father     Hypertension Father     Heart disease Father     Cancer Maternal Uncle     Cancer Paternal Uncle     Diabetes Paternal Uncle     Cancer Maternal Grandmother     Vision loss Maternal Grandfather     Heart disease Paternal Grandfather     Diabetes Paternal Uncle          Medications have been verified  Objective   /62 (Patient Position: Sitting)   Pulse 80   Temp 99 1 °F (37 3 °C) (Temporal)   Resp 20   Ht 5' 2" (1 575 m)   Wt 56 2 kg (124 lb)   SpO2 99%   BMI 22 68 kg/m²        Physical Exam     Physical Exam   Constitutional: She is oriented to person, place, and time  Vital signs are normal  She appears well-developed and well-nourished  She is active  No distress  HENT:   Head: Normocephalic  Right Ear: Hearing, tympanic membrane, external ear and ear canal normal    Left Ear: Hearing, tympanic membrane, external ear and ear canal normal    Nose: Mucosal edema present   Right sinus exhibits maxillary sinus tenderness  Left sinus exhibits maxillary sinus tenderness  Mouth/Throat: Uvula is midline, oropharynx is clear and moist and mucous membranes are normal    Cardiovascular: Normal rate, regular rhythm, S1 normal, S2 normal and normal heart sounds  Pulmonary/Chest: Effort normal and breath sounds normal  No respiratory distress  She has no decreased breath sounds  She has no wheezes  She has no rhonchi  She has no rales  Neurological: She is alert and oriented to person, place, and time  She is not disoriented  Skin: Skin is warm, dry and intact

## 2021-01-27 ENCOUNTER — TELEPHONE (OUTPATIENT)
Dept: OBGYN CLINIC | Facility: CLINIC | Age: 36
End: 2021-01-27

## 2021-01-27 NOTE — TELEPHONE ENCOUNTER
Pt states her LMP is 1/21/21, menstrual bleeding usually lasts 4 to 5 days  Bleeding decreased Saturday 1/23/21 which she thought her period was finishing but on Sunday her bleeding increased which lasted until yesterday, causing her to change her tampon hourly  Today bleeding has slowed down not needing to change her tampons, changing every 2-3 hours  Wearing super tampons  No clots with bleeding  Lower pelvic and back pain, 7/10 pain scale  Not taking any medication for pain  Not taking any birth control  Has not taken UPT to rule out pregnancy, states spouse had a vasectomy three years ago unsure about follow up sample  This past month pt has had spotting and experiencing bleeding during and after intercourse

## 2021-01-27 NOTE — TELEPHONE ENCOUNTER
Pt called complaining that her period is on day 10 which she says is abnormal for her  She says she's bleeding very heavy going to a tampon every hour and has lower pelvic pain  Had cramps most of this month before her period  Wants a call back

## 2021-01-28 ENCOUNTER — OFFICE VISIT (OUTPATIENT)
Dept: OBGYN CLINIC | Facility: CLINIC | Age: 36
End: 2021-01-28
Payer: COMMERCIAL

## 2021-01-28 VITALS
HEIGHT: 62 IN | BODY MASS INDEX: 23.37 KG/M2 | SYSTOLIC BLOOD PRESSURE: 112 MMHG | WEIGHT: 127 LBS | DIASTOLIC BLOOD PRESSURE: 78 MMHG

## 2021-01-28 DIAGNOSIS — F32.81 PMDD (PREMENSTRUAL DYSPHORIC DISORDER): ICD-10-CM

## 2021-01-28 DIAGNOSIS — N93.9 ABNORMAL UTERINE BLEEDING: Primary | ICD-10-CM

## 2021-01-28 DIAGNOSIS — N92.6 IRREGULAR MENSES: ICD-10-CM

## 2021-01-28 PROCEDURE — 99214 OFFICE O/P EST MOD 30 MIN: CPT | Performed by: PHYSICIAN ASSISTANT

## 2021-01-28 RX ORDER — FLUOXETINE 10 MG/1
10 CAPSULE ORAL DAILY
Qty: 30 CAPSULE | Refills: 1 | Status: SHIPPED | OUTPATIENT
Start: 2021-01-28 | End: 2021-05-20

## 2021-01-28 NOTE — PROGRESS NOTES
Assessment/Plan:    PMDD (premenstrual dysphoric disorder)  Reviewed PMDD with patient in length  Reviewed dietary and lifestyle modifications that can help  Reviewed options such as hormonal OCP to help with mood swings and menses  Patient has tolerated well in the past and would like to avoid if possible  Reviewed recommendation to see therapist/counselor  Reviewed option of Prozac week prior to menses and week of menses  Patient is interested in trying  Will plan to start Prozac 10mg  Reviewed possible side effects of SSRIs including decreased libido, weight gain, GI upset, fatigue  Aware if has thoughts to harm self or others to stop immediately and seek medical attention  Will plan to follow up on effectiveness at scheduled annual exam      Abnormal uterine bleeding  Reviewed irregular, heavy uterine bleeding with patient  Will plan blood work and ultrasound to further evaluate  Office will call with results and appropriate follow up recommendations  Problem List Items Addressed This Visit        Genitourinary    Abnormal uterine bleeding - Primary     Reviewed irregular, heavy uterine bleeding with patient  Will plan blood work and ultrasound to further evaluate  Office will call with results and appropriate follow up recommendations  Relevant Orders    US pelvis complete w transvaginal       Other    PMDD (premenstrual dysphoric disorder)     Reviewed PMDD with patient in length  Reviewed dietary and lifestyle modifications that can help  Reviewed options such as hormonal OCP to help with mood swings and menses  Patient has tolerated well in the past and would like to avoid if possible  Reviewed recommendation to see therapist/counselor  Reviewed option of Prozac week prior to menses and week of menses  Patient is interested in trying  Will plan to start Prozac 10mg  Reviewed possible side effects of SSRIs including decreased libido, weight gain, GI upset, fatigue   Aware if has thoughts to harm self or others to stop immediately and seek medical attention  Will plan to follow up on effectiveness at scheduled annual exam           Relevant Medications    FLUoxetine (PROzac) 10 mg capsule    Other Relevant Orders    TSH, 3rd generation    T4, free    CBC and differential      Other Visit Diagnoses     Irregular menses        Relevant Orders    TSH, 3rd generation    T4, free    CBC and differential    US pelvis complete w transvaginal            Subjective:      Patient ID: Vicenta Lindsay is a 38 y o  female  HPI   39 yo seen for irregular bleeding and pelvic pain  Last menses ended 1/1/2021  Spotted for several days after  Cramping and spotting  Got menses on 1/20/2021 (Day 24)  Was like a normal menses  Monday cramps woke up in middle of night, shooting pain, cramping  Changing every hour  After exercising worse  Typically exercises 7 days a week  Did pass a clot yesterday  Having issues with anxiety  Not sleeping well  Denies thoughts to harm self or others  Has situational stressors  Pain mostly on the right side  Last few months pain is more on right side mostly in back  Ibuprofen does not help with the pain  This month felt like gained 5 lbs  Diet is better, exercising more often  Pregnancy test negative  Has tried hormones in the past, did not tolerate ortho tricyclen well  Last 3 times after intercourse, did have bleeding  No pain while it is occurring  Denies bowel or bladder issues  The following portions of the patient's history were reviewed and updated as appropriate:   She  has a past medical history of Anxiety, Asthma, Chronic kidney disease, and Varicella    She   Patient Active Problem List    Diagnosis Date Noted    Abnormal uterine bleeding 02/01/2021    PMDD (premenstrual dysphoric disorder) 02/01/2021    Screening for cardiovascular condition 11/07/2019    Asthma 06/29/2017    Vaginal delivery 06/29/2017    Allergic rhinitis 11/23/2015    Anxiety disorder 09/04/2012     She  has a past surgical history that includes pr surg rx missed abortn,1st tri (N/A, 3/11/2016)  Her family history includes Cancer in her maternal grandmother, maternal uncle, and paternal uncle; Diabetes in her paternal uncle and paternal uncle; Heart disease in her father and paternal grandfather; Hyperlipidemia in her father and mother; Hypertension in her father; Vision loss in her maternal grandfather  She  reports that she has never smoked  She has never used smokeless tobacco  She reports current alcohol use  She reports that she does not use drugs  Current Outpatient Medications   Medication Sig Dispense Refill    albuterol (PROVENTIL HFA,VENTOLIN HFA) 90 mcg/act inhaler Inhale 2 puffs every 6 (six) hours as needed for wheezing      cetirizine (ZyrTEC) 10 MG chewable tablet Chew 10 mg daily      FLUoxetine (PROzac) 10 mg capsule Take 1 capsule (10 mg total) by mouth daily The week prior and week of menses  30 capsule 1     No current facility-administered medications for this visit  She is allergic to sulfa antibiotics       Review of Systems   Constitutional: Negative for fatigue, fever and unexpected weight change  HENT: Negative for dental problem and sinus pressure  Eyes: Negative for visual disturbance  Respiratory: Negative for cough, shortness of breath and wheezing  Cardiovascular: Negative for chest pain  Gastrointestinal: Negative for abdominal pain, blood in stool, constipation, diarrhea, nausea and vomiting  Endocrine: Negative for polydipsia  Genitourinary: Positive for menstrual problem  Negative for difficulty urinating, dyspareunia, dysuria, frequency, hematuria, pelvic pain and urgency  Musculoskeletal: Negative for arthralgias and back pain  Neurological: Negative for dizziness, seizures, light-headedness and headaches  Psychiatric/Behavioral: Negative for suicidal ideas  The patient is nervous/anxious            Objective:      BP 112/78 (BP Location: Left arm, Patient Position: Sitting, Cuff Size: Standard)   Ht 5' 2" (1 575 m)   Wt 57 6 kg (127 lb)   LMP 01/20/2021 (Approximate)   BMI 23 23 kg/m²          Physical Exam  Constitutional:       Appearance: Normal appearance  She is well-developed  Neck:      Musculoskeletal: Neck supple  Thyroid: No thyroid mass or thyromegaly  Cardiovascular:      Rate and Rhythm: Normal rate and regular rhythm  Heart sounds: Normal heart sounds  No murmur  No friction rub  No gallop  Pulmonary:      Effort: Pulmonary effort is normal  No respiratory distress  Breath sounds: Normal breath sounds  No wheezing or rales  Abdominal:      General: Bowel sounds are normal  There is no distension  Palpations: Abdomen is soft  There is no mass  Tenderness: There is no abdominal tenderness  There is no guarding or rebound  Genitourinary:     Labia:         Right: No rash, tenderness, lesion or injury  Left: No rash, tenderness, lesion or injury  Urethra: No prolapse, urethral pain, urethral swelling or urethral lesion  Vagina: No signs of injury  No vaginal discharge, erythema, tenderness or bleeding  Cervix: No cervical motion tenderness, discharge or friability  Uterus: Not deviated, not enlarged and not tender  Adnexa:         Right: No mass, tenderness or fullness  Left: No mass, tenderness or fullness  Lymphadenopathy:      Cervical: No cervical adenopathy  Upper Body:      Right upper body: No supraclavicular adenopathy  Left upper body: No supraclavicular adenopathy  Skin:     General: Skin is warm and dry  Coloration: Skin is not pale  Findings: No erythema or rash  Neurological:      Mental Status: She is alert and oriented to person, place, and time  Psychiatric:         Behavior: Behavior normal          Thought Content:  Thought content normal          Judgment: Judgment normal

## 2021-02-01 PROBLEM — N93.9 ABNORMAL UTERINE BLEEDING: Status: ACTIVE | Noted: 2021-02-01

## 2021-02-01 PROBLEM — F32.81 PMDD (PREMENSTRUAL DYSPHORIC DISORDER): Status: ACTIVE | Noted: 2021-02-01

## 2021-02-02 NOTE — ASSESSMENT & PLAN NOTE
Reviewed PMDD with patient in length  Reviewed dietary and lifestyle modifications that can help  Reviewed options such as hormonal OCP to help with mood swings and menses  Patient has tolerated well in the past and would like to avoid if possible  Reviewed recommendation to see therapist/counselor  Reviewed option of Prozac week prior to menses and week of menses  Patient is interested in trying  Will plan to start Prozac 10mg  Reviewed possible side effects of SSRIs including decreased libido, weight gain, GI upset, fatigue  Aware if has thoughts to harm self or others to stop immediately and seek medical attention     Will plan to follow up on effectiveness at scheduled annual exam

## 2021-02-02 NOTE — ASSESSMENT & PLAN NOTE
Reviewed irregular, heavy uterine bleeding with patient  Will plan blood work and ultrasound to further evaluate  Office will call with results and appropriate follow up recommendations

## 2021-02-03 ENCOUNTER — HOSPITAL ENCOUNTER (OUTPATIENT)
Dept: RADIOLOGY | Facility: MEDICAL CENTER | Age: 36
Discharge: HOME | End: 2021-02-03
Payer: COMMERCIAL

## 2021-02-03 ENCOUNTER — LAB (OUTPATIENT)
Dept: LAB | Facility: MEDICAL CENTER | Age: 36
End: 2021-02-03
Payer: COMMERCIAL

## 2021-02-03 DIAGNOSIS — N93.9 ABNORMAL UTERINE BLEEDING: ICD-10-CM

## 2021-02-03 DIAGNOSIS — N92.6 IRREGULAR MENSES: ICD-10-CM

## 2021-02-03 DIAGNOSIS — F32.81 PMDD (PREMENSTRUAL DYSPHORIC DISORDER): ICD-10-CM

## 2021-02-03 LAB
BASOPHILS # BLD AUTO: 0.04 THOUSANDS/ΜL (ref 0–0.1)
BASOPHILS NFR BLD AUTO: 1 % (ref 0–1)
EOSINOPHIL # BLD AUTO: 0.07 THOUSAND/ΜL (ref 0–0.61)
EOSINOPHIL NFR BLD AUTO: 1 % (ref 0–6)
ERYTHROCYTE [DISTWIDTH] IN BLOOD BY AUTOMATED COUNT: 12 % (ref 11.6–15.1)
HCT VFR BLD AUTO: 40.3 % (ref 34.8–46.1)
HGB BLD-MCNC: 13.2 G/DL (ref 11.5–15.4)
IMM GRANULOCYTES # BLD AUTO: 0.01 THOUSAND/UL (ref 0–0.2)
IMM GRANULOCYTES NFR BLD AUTO: 0 % (ref 0–2)
LYMPHOCYTES # BLD AUTO: 1.26 THOUSANDS/ΜL (ref 0.6–4.47)
LYMPHOCYTES NFR BLD AUTO: 25 % (ref 14–44)
MCH RBC QN AUTO: 30.1 PG (ref 26.8–34.3)
MCHC RBC AUTO-ENTMCNC: 32.8 G/DL (ref 31.4–37.4)
MCV RBC AUTO: 92 FL (ref 82–98)
MONOCYTES # BLD AUTO: 0.34 THOUSAND/ΜL (ref 0.17–1.22)
MONOCYTES NFR BLD AUTO: 7 % (ref 4–12)
NEUTROPHILS # BLD AUTO: 3.33 THOUSANDS/ΜL (ref 1.85–7.62)
NEUTS SEG NFR BLD AUTO: 66 % (ref 43–75)
NRBC BLD AUTO-RTO: 0 /100 WBCS
PLATELET # BLD AUTO: 290 THOUSANDS/UL (ref 149–390)
PMV BLD AUTO: 10.3 FL (ref 8.9–12.7)
RBC # BLD AUTO: 4.39 MILLION/UL (ref 3.81–5.12)
T4 FREE SERPL-MCNC: 0.75 NG/DL (ref 0.76–1.46)
TSH SERPL DL<=0.05 MIU/L-ACNC: 1.26 UIU/ML (ref 0.36–3.74)
WBC # BLD AUTO: 5.05 THOUSAND/UL (ref 4.31–10.16)

## 2021-02-03 PROCEDURE — 84443 ASSAY THYROID STIM HORMONE: CPT

## 2021-02-03 PROCEDURE — 76830 TRANSVAGINAL US NON-OB: CPT

## 2021-02-03 PROCEDURE — 85025 COMPLETE CBC W/AUTO DIFF WBC: CPT

## 2021-02-03 PROCEDURE — 76856 US EXAM PELVIC COMPLETE: CPT

## 2021-02-03 PROCEDURE — 84439 ASSAY OF FREE THYROXINE: CPT

## 2021-02-03 PROCEDURE — 36415 COLL VENOUS BLD VENIPUNCTURE: CPT

## 2021-03-01 NOTE — PROGRESS NOTES
Assessment/Plan   Diagnoses and all orders for this visit:    Well woman exam  -     IGP, Aptima HPV    Family history of breast cancer  -     Mammo screening bilateral w 3d & cad; Future    Encounter for screening mammogram for malignant neoplasm of breast  -     Mammo screening bilateral w 3d & cad; Future    Postcoital bleeding  -     Chlamydia/GC amplified DNA by PCR  -     Trichomonas Vaginalis, [place]        Discussion    All questions have been answered to her satisfaction  RTO for APE or sooner if needed      Subjective     HPI   Cloycrochelle Davis is a 39 y o  female who presents for annual well woman exam    Menarche - ; LMP -2/17/21  ;Periods previously were q 28 days very light and regular  Since the fall cycles have been much heavier  Saw us in January with irreg bleeding  Had 12 day cycle in January  Then February started normal, then stopped for a few day, then took a few days off then very heavy and crampy on return  Feb cycle lasted 7 days  Will also have cramping and spotting after IC, no issues w IC  Had work up was [employer]  Menorrhagia/dysmenorrhea:  I r/w pt heavy menstrual cycles  We reviewed multiple different etiologies and  tx options that can help w menses including but not limited to: NSAIDs, Lysteda, hormonal options including OCPs, IUDs and Depo Provera, as well as surgical options including endometrial ablation and hysterectomy  I r/w pt risks and benefits of each therapy option and side effects as well  No vulvar itch/burn; No vaginal itch/burn; No abn discharge or odor; No urinary sx - burning/pain/frequency/hematuria    (+) SBEs - no breast masses, asymmetry, nipple discharge or bleeding, changes in skin of breast, or breast tenderness bilaterally    No abd/pelvic pain or HAs; No menopausal symptoms: No hot flashes/night sweats, problems with intercourse, vaginal dryness; sleeping well;     Pt is sexually active in a mutually monog/ sexual relationship;  She does desire std testing; She states this past year has been very stressful and desires to be sure  Feels safe at home    Current contraception: vasectomy for [facility]  (+) PCP for routine Bw/care; Last Pap - years ago  History of abnormal Pap smear: denies     Review of Systems   Constitutional: Negative  Respiratory: Negative  Gastrointestinal: Negative  Endocrine: Negative  Genitourinary: Negative  The following portions of the patient's history were reviewed and updated as appropriate: allergies, current medications, past family history, past medical history, past social history, past surgical history and problem list          OB History        4    Para   2    Term   1       1    AB   2    Living   2       SAB   1    TAB   1    Ectopic        Multiple   0    Live Births   2                 Past Medical History:   Diagnosis Date    Anxiety     Asthma     Chronic kidney disease     stones    Varicella     childhood       Past Surgical History:   Procedure Laterality Date    [place] SURG RX MISSED ABORTN,1ST TRI N/A 3/11/2016    Procedure: DILATATION AND EVACUATION (D&E);   Surgeon: Carlitos Dos Santos MD;  [facility]: [facility];  Service: Gynecology    RECONSTRUCTIVE REPAIR STERNAL  2005    leg       Family History   Problem Relation Age of Onset    Hyperlipidemia Mother     Hyperlipidemia Father     Hypertension Father     Heart disease Father     Cancer Maternal Uncle     Cancer Paternal Uncle     Diabetes Paternal Uncle     Cancer Maternal Grandmother     Vision loss Maternal Grandfather     Heart disease Paternal Grandfather     Diabetes Paternal Uncle        Social History     Socioeconomic History    Marital status: /Civil Union     Spouse name: Not on file    Number of children: Not on file    Years of education: Not on file    Highest education level: Not on file   Occupational History    Not on file   Social Needs    Financial resource strain: Not on file   [place] insecurity     Worry: Not on file     Inability: Not on file    Transportation needs     Medical: Not on file     Non-medical: Not on file   Tobacco Use    Smoking status: Never Smoker    Smokeless tobacco: Never Used   Substance and Sexual Activity    Alcohol use: Yes     Frequency: 2-4 times a month     Comment: occasional    Drug use: No    Sexual activity: Yes     Partners: Male   Lifestyle    Physical activity     Days per week: Not on file     Minutes per session: Not on file    Stress: Not on file   Relationships    Social connections     Talks on phone: Not on file     Gets together: Not on file     Attends [employer] service: Not on file     Active member of club or organization: Not on file     Attends meetings of clubs or organizations: Not on file     Relationship status: Not on file    Intimate partner violence     Fear of current or ex partner: Not on file     Emotionally abused: Not on file     Physically abused: Not on file     Forced sexual activity: Not on file   Other Topics Concern    Not on file   Social History Narrative    Not on file         Current Outpatient Medications:     albuterol (PROVENTIL HFA,VENTOLIN HFA) 90 mcg/act inhaler, Inhale 2 puffs every 6 (six) hours as needed for wheezing, Disp: , Rfl:     cetirizine (ZyrTEC) 10 MG chewable tablet, Chew 10 mg daily, Disp: , Rfl:     FLUoxetine (PROzac) 10 mg capsule, Take 1 capsule (10 mg total) by mouth daily The week prior and week of menses  , Disp: 30 capsule, Rfl: 1    Allergies   Allergen Reactions    Sulfa Antibiotics      Unknown, childhood alllergy       Objective   Vitals:    03/02/21 0832   BP: 106/72   BP Location: Left arm   Patient Position: Sitting   Cuff Size: Standard   Weight: 56 2 kg (124 lb)   Height: 5' 2" (1 575 m)     Physical Exam  Constitutional:       Appearance: She is well-developed  HENT:      Head: Normocephalic and atraumatic     Cardiovascular:      Rate and Rhythm: Normal rate and regular rhythm  Pulmonary:      Effort: Pulmonary effort is normal       Breath sounds: Normal breath sounds  Chest:      Breasts: Breasts are symmetrical          Right: No inverted nipple, mass, nipple discharge, skin change or tenderness  Left: No inverted nipple, mass, nipple discharge, skin change or tenderness  Comments: Pt with dense breast tissue  Very thick and fibrous b/l  Abdominal:      General: There is no distension  Palpations: Abdomen is soft  There is no mass  Tenderness: There is no guarding or rebound  Genitourinary:     Exam position: Supine  Labia:         Right: No rash  Left: No rash  Vagina: No signs of injury and foreign body  No vaginal discharge or erythema  Cervix: No cervical motion tenderness  Adnexa:         Right: No mass  Left: No mass  Skin:     General: Skin is warm and dry  Neurological:      Mental Status: She is alert and oriented to person, place, and time  Patient Instructions   Pap and STD cx's done  Baseline mammo ordered due to [place] h[place] breast [place] [facility] [place] 35  Will plan NSAIDs for now  She does not desire any further tx at this point  She is against any hormonal tx for menses at this point  Pt does note that she took the prozac with her last cycle to help improve her PMDD and felt sx were much better  Will plan to take monthly approx 4 days prior to through her early cycle

## 2021-03-02 ENCOUNTER — OFFICE VISIT (OUTPATIENT)
Dept: OBGYN CLINIC | Facility: CLINIC | Age: 36
End: 2021-03-02
Payer: COMMERCIAL

## 2021-03-02 VITALS
DIASTOLIC BLOOD PRESSURE: 72 MMHG | SYSTOLIC BLOOD PRESSURE: 106 MMHG | HEIGHT: 62 IN | WEIGHT: 124 LBS | BODY MASS INDEX: 22.82 KG/M2

## 2021-03-02 DIAGNOSIS — Z80.3 FAMILY HISTORY OF BREAST CANCER: ICD-10-CM

## 2021-03-02 DIAGNOSIS — N93.0 POSTCOITAL BLEEDING: ICD-10-CM

## 2021-03-02 DIAGNOSIS — Z12.31 ENCOUNTER FOR SCREENING MAMMOGRAM FOR MALIGNANT NEOPLASM OF BREAST: ICD-10-CM

## 2021-03-02 DIAGNOSIS — Z01.419 WELL WOMAN EXAM: Primary | ICD-10-CM

## 2021-03-02 PROBLEM — Z13.6 SCREENING FOR CARDIOVASCULAR CONDITION: Status: RESOLVED | Noted: 2019-11-07 | Resolved: 2021-03-02

## 2021-03-02 PROCEDURE — 1036F TOBACCO NON-USER: CPT | Performed by: PHYSICIAN ASSISTANT

## 2021-03-02 PROCEDURE — 99395 PREV VISIT EST AGE 18-39: CPT | Performed by: PHYSICIAN ASSISTANT

## 2021-03-02 PROCEDURE — 3008F BODY MASS INDEX DOCD: CPT | Performed by: PHYSICIAN ASSISTANT

## 2021-03-02 NOTE — PATIENT INSTRUCTIONS
Pap and STD cx's done  Baseline mammo ordered due to family h/o breast cancer in [place] at age 35  Will plan NSAIDs for now  She does not desire any further tx at this point  She is against any hormonal tx for menses at this point  Pt does note that she took the prozac with her last cycle to help improve her PMDD and felt sx were much better  Will plan to take monthly approx 4 days prior to through her early cycle

## 2021-03-07 LAB
C TRACH RRNA SPEC QL NAA+PROBE: NEGATIVE
CYTOLOGIST CVX/VAG CYTO: ABNORMAL
DX ICD CODE: ABNORMAL
HPV I/H RISK 4 DNA CVX QL PROBE+SIG AMP: POSITIVE
Lab: ABNORMAL
N GONORRHOEA RRNA SPEC QL NAA+PROBE: NEGATIVE
OTHER STN SPEC: ABNORMAL
PATH REPORT.FINAL DX SPEC: ABNORMAL
SL AMB NOTE:: ABNORMAL
SL AMB SPECIMEN ADEQUACY: ABNORMAL
SL AMB TEST METHODOLOGY: ABNORMAL
T VAGINALIS RRNA SPEC QL NAA+PROBE: NEGATIVE

## 2021-03-10 DIAGNOSIS — Z23 ENCOUNTER FOR IMMUNIZATION: ICD-10-CM

## 2021-03-23 ENCOUNTER — TELEPHONE (OUTPATIENT)
Dept: OBGYN CLINIC | Facility: CLINIC | Age: 36
End: 2021-03-23

## 2021-03-23 NOTE — TELEPHONE ENCOUNTER
Sallie states that the specimen is in process  It does not look like the request was ever sent properly to the department  Sallie states I can call her tomorrow to discuss the specimen - [id number]

## 2021-03-23 NOTE — TELEPHONE ENCOUNTER
Contacted [facility] again to check on pts specimen for her HPV genotype  Sallie states she is going to contact the department  Sallie is aware we have been waiting on the test results since we requested them on 3/8/21  Unsure if specimen is in process  Sallie states she will contact the office back once she hears back from the department, office number provided

## 2021-03-31 LAB
HPV I/H RISK 4 DNA CVX QL PROBE+SIG AMP: POSITIVE
HPV16 DNA CVX QL PROBE+SIG AMP: NEGATIVE
HPV18+45 E6+E7 MRNA CVX QL NAA+PROBE: NEGATIVE

## 2021-05-20 DIAGNOSIS — F32.81 PMDD (PREMENSTRUAL DYSPHORIC DISORDER): ICD-10-CM

## 2021-05-20 RX ORDER — FLUOXETINE 10 MG/1
10 CAPSULE ORAL DAILY
Qty: 30 CAPSULE | Refills: 1 | Status: SHIPPED | OUTPATIENT
Start: 2021-05-20 | End: 2021-08-19

## 2021-08-16 DIAGNOSIS — F32.81 PMDD (PREMENSTRUAL DYSPHORIC DISORDER): ICD-10-CM

## 2021-08-19 RX ORDER — FLUOXETINE 10 MG/1
10 CAPSULE ORAL DAILY
Qty: 45 CAPSULE | Refills: 1 | Status: SHIPPED | OUTPATIENT
Start: 2021-08-19 | End: 2022-05-25 | Stop reason: SDUPTHER

## 2021-11-05 ENCOUNTER — OFFICE VISIT (OUTPATIENT)
Dept: URGENT CARE | Facility: CLINIC | Age: 36
End: 2021-11-05
Payer: COMMERCIAL

## 2021-11-05 VITALS
OXYGEN SATURATION: 98 % | SYSTOLIC BLOOD PRESSURE: 141 MMHG | HEART RATE: 52 BPM | TEMPERATURE: 98.1 F | WEIGHT: 115 LBS | BODY MASS INDEX: 21.16 KG/M2 | DIASTOLIC BLOOD PRESSURE: 70 MMHG | RESPIRATION RATE: 14 BRPM | HEIGHT: 62 IN

## 2021-11-05 DIAGNOSIS — J01.00 ACUTE MAXILLARY SINUSITIS, RECURRENCE NOT SPECIFIED: Primary | ICD-10-CM

## 2021-11-05 DIAGNOSIS — J45.21 MILD INTERMITTENT ASTHMA WITH ACUTE EXACERBATION: ICD-10-CM

## 2021-11-05 PROCEDURE — 99213 OFFICE O/P EST LOW 20 MIN: CPT | Performed by: NURSE PRACTITIONER

## 2021-11-05 RX ORDER — AMOXICILLIN AND CLAVULANATE POTASSIUM 875; 125 MG/1; MG/1
1 TABLET, FILM COATED ORAL EVERY 12 HOURS SCHEDULED
Qty: 14 TABLET | Refills: 0 | Status: SHIPPED | OUTPATIENT
Start: 2021-11-05 | End: 2021-11-12

## 2021-11-05 RX ORDER — PREDNISONE 10 MG/1
TABLET ORAL
Qty: 30 TABLET | Refills: 0 | Status: SHIPPED | OUTPATIENT
Start: 2021-11-05 | End: 2022-05-22 | Stop reason: ALTCHOICE

## 2022-05-22 ENCOUNTER — OFFICE VISIT (OUTPATIENT)
Dept: URGENT CARE | Facility: CLINIC | Age: 37
End: 2022-05-22
Payer: COMMERCIAL

## 2022-05-22 VITALS
OXYGEN SATURATION: 100 % | RESPIRATION RATE: 16 BRPM | BODY MASS INDEX: 21.71 KG/M2 | HEIGHT: 62 IN | HEART RATE: 68 BPM | TEMPERATURE: 98.4 F | WEIGHT: 118 LBS

## 2022-05-22 DIAGNOSIS — J45.901 MILD ASTHMA WITH ACUTE EXACERBATION, UNSPECIFIED WHETHER PERSISTENT: Primary | ICD-10-CM

## 2022-05-22 PROCEDURE — 99213 OFFICE O/P EST LOW 20 MIN: CPT

## 2022-05-22 RX ORDER — PREDNISONE 20 MG/1
20 TABLET ORAL 2 TIMES DAILY WITH MEALS
Qty: 20 TABLET | Refills: 0 | Status: SHIPPED | OUTPATIENT
Start: 2022-05-22 | End: 2022-06-01

## 2022-05-22 NOTE — PROGRESS NOTES
[facility]        NAME: Pito Ko is a 44 y o  female  : 1985    MRN: [id number]  DATE: May 22, 2022  TIME: 12:04 PM    Assessment and Plan   Mild asthma with acute exacerbation, unspecified whether persistent [J45 901]  1  Mild asthma with acute exacerbation, unspecified whether persistent  predniSONE 20 mg tablet    65-year-old female presents for asthma exacerbation  She notes that the last exacerbation she had in November was well controlled with a 10 day course of prednisone  At this point, agree with plan to initiate short course of steroids, advised patient follows up with her primary care provider for refill of Singulair and Advair Diskus  May continue to use p r n  inhaler as needed, in combination with hot shower humidifier  Patient Instructions   Report to emergency department if:  -Chest pain/SOB/wheezing uncontrolled by rescue inhaler  -Intractable fever > 100 4   -Unable to tolerate P O  fluids or manage saliva    Follow up with PCP in 3-5 days  Proceed to  ER if symptoms worsen  Chief Complaint     Chief Complaint   Patient presents with    Asthma     Pt is c/o asthma flare up- she used her rescue inhaler but now she needs singular or advair inhaler- she also reports post nasal drip and not being tested for covid x 3 weeks  Denies any fever or chills  History of Present Illness       Patient is a 42-year-old female with past medical history significant for asthma and allergies who presents for chief complaint of congestion and dry cough since yesterday  She notes that her asthma typically flares in the winter and spring months, and notes that she was running in the humid weather yesterday, which she feels may have triggered an asthma flare  She reports some mild congestion and postnasal drip, as well as a dry cough  She is a runner and notes that she has had decreased exercise tolerance over the past 24-48 hours as well    She had to use her Advair inhaler once yesterday, and took a hot shower which she notes helped  She normally takes Singulair and Advair, but has not been able to refill these medications within the last year, and notes that typically her asthma is well controlled  She denies fever, body aches, chest pain, shortness a breath  Asthma  She complains of cough  There is no shortness of breath or wheezing  Associated symptoms include postnasal drip  Pertinent negatives include no chest pain, ear pain, fever, headaches, myalgias, rhinorrhea, sneezing or sore throat  Her past medical history is significant for asthma  Review of Systems   Review of Systems   Constitutional: Negative for chills, fatigue and fever  HENT: Positive for congestion and postnasal drip  Negative for ear pain, rhinorrhea, sinus pressure, sinus pain, sneezing and sore throat  Eyes: Negative for pain and visual disturbance  Respiratory: Positive for cough  Negative for apnea, choking, chest tightness, shortness of breath, wheezing and stridor  Cardiovascular: Negative for chest pain, palpitations and leg swelling  Gastrointestinal: Negative for abdominal pain, constipation, diarrhea, nausea and vomiting  Endocrine: Negative  Genitourinary: Negative for dysuria and hematuria  Musculoskeletal: Negative for arthralgias, back pain, joint swelling, myalgias, neck pain and neck stiffness  Skin: Negative for color change and rash  Allergic/Immunologic: Positive for environmental allergies  Neurological: Negative for dizziness, seizures, syncope, facial asymmetry, light-headedness and headaches  Hematological: Negative for adenopathy  Psychiatric/Behavioral: Negative  All other systems reviewed and are negative          Current Medications       Current Outpatient Medications:     albuterol (PROVENTIL HFA,VENTOLIN HFA) 90 mcg/act inhaler, Inhale 2 puffs every 6 (six) hours as needed for wheezing, Disp: , Rfl:     cetirizine (ZyrTEC) 10 MG chewable tablet, Chew 10 mg daily, Disp: , Rfl:     predniSONE 20 mg tablet, Take 1 tablet (20 mg total) by mouth in the morning and 1 tablet (20 mg total) in the evening  Take with meals  Do all this for 10 days  , Disp: 20 tablet, Rfl: 0    FLUoxetine (PROzac) 10 mg capsule, TAKE 1 CAPSULE (10 MG TOTAL) BY MOUTH DAILY THE WEEK PRIOR AND WEEK OF MENSES  (Patient not taking: Reported on 5/22/2022), Disp: 45 capsule, Rfl: 1    Current Allergies     Allergies as of 05/22/2022 - Reviewed 05/22/2022   Allergen Reaction Noted    Sulfa antibiotics  03/10/2016            The following portions of the patient's history were reviewed and updated as appropriate: allergies, current medications, past family history, past medical history, past social history, past surgical history and problem list      Past Medical History:   Diagnosis Date    Anxiety     Asthma     Chronic kidney disease     stones    Varicella     childhood       Past Surgical History:   Procedure Laterality Date    NE SURG RX MISSED ABORTN,1ST TRI N/A 3/11/2016    Procedure: DILATATION AND EVACUATION (D&E); Surgeon: Misty Rose MD;  Location: [facility];  Service: Gynecology    RECONSTRUCTIVE REPAIR STERNAL  2005    leg       Family History   Problem Relation Age of Onset    Hyperlipidemia Mother     Hyperlipidemia Father     Hypertension Father     Heart disease Father     Cancer Maternal Uncle     Cancer Paternal Uncle     Diabetes Paternal Uncle     Cancer Maternal Grandmother     Vision loss Maternal Grandfather     Heart disease Paternal Grandfather     Diabetes Paternal Uncle          Medications have been verified  Objective   Pulse 68   Temp 98 4 °F (36 9 °C)   Resp 16   Ht 5' 2" (1 575 m)   Wt 53 5 kg (118 lb)   SpO2 100%   BMI 21 58 kg/m²        Physical Exam     Physical Exam  Vitals and nursing note reviewed  Constitutional:       General: She is not in acute distress  Appearance: Normal appearance   She is not ill-appearing, toxic-appearing or diaphoretic  Interventions: She is not intubated  HENT:      Head: Normocephalic and atraumatic  Right Ear: Tympanic membrane, ear canal and external ear normal  There is no impacted cerumen  Left Ear: Tympanic membrane, ear canal and external ear normal  There is no impacted cerumen  Nose: Congestion present  No rhinorrhea  Mouth/Throat:      Mouth: Mucous membranes are moist       Tongue: No lesions  Tongue does not deviate from midline  Pharynx: Oropharynx is clear  No pharyngeal swelling, oropharyngeal exudate, posterior oropharyngeal erythema or uvula swelling  Tonsils: No tonsillar exudate or tonsillar abscesses  1+ on the right  1+ on the left  Eyes:      Extraocular Movements: Extraocular movements intact  Conjunctiva/sclera: Conjunctivae normal       Pupils: Pupils are equal, round, and reactive to light  Cardiovascular:      Rate and Rhythm: Normal rate and regular rhythm  Pulses: Normal pulses  Heart sounds: Normal heart sounds, S1 normal and S2 normal  No murmur heard  No friction rub  No gallop  Pulmonary:      Effort: Pulmonary effort is normal  No tachypnea, bradypnea, accessory muscle usage, prolonged expiration, respiratory distress or retractions  She is not intubated  Breath sounds: Normal breath sounds  No stridor, decreased air movement or transmitted upper airway sounds  No decreased breath sounds, wheezing, rhonchi or rales  Abdominal:      General: Bowel sounds are normal       Palpations: Abdomen is soft  Tenderness: There is no abdominal tenderness  There is no guarding or rebound  Musculoskeletal:         General: Normal range of motion  Cervical back: Normal range of motion and neck supple  No rigidity or tenderness  Lymphadenopathy:      Cervical: No cervical adenopathy  Skin:     General: Skin is warm and dry        Capillary Refill: Capillary refill takes less than 2 seconds  Neurological:      General: No focal deficit present  Mental Status: She is alert and oriented to person, place, and time  Cranial Nerves: No cranial nerve deficit     Psychiatric:         Mood and Affect: Mood normal          Behavior: Behavior normal

## 2022-05-25 ENCOUNTER — AMB VIDEO VISIT (OUTPATIENT)
Dept: OTHER | Facility: HOSPITAL | Age: 37
End: 2022-05-25

## 2022-05-25 DIAGNOSIS — J01.00 ACUTE NON-RECURRENT MAXILLARY SINUSITIS: Primary | ICD-10-CM

## 2022-05-25 DIAGNOSIS — F32.81 PMDD (PREMENSTRUAL DYSPHORIC DISORDER): ICD-10-CM

## 2022-05-25 PROCEDURE — ECARE PR SL URGENT CARE VIRTUAL VISIT: Performed by: PHYSICIAN ASSISTANT

## 2022-05-25 RX ORDER — AMOXICILLIN AND CLAVULANATE POTASSIUM 875; 125 MG/1; MG/1
1 TABLET, FILM COATED ORAL EVERY 12 HOURS SCHEDULED
Qty: 20 TABLET | Refills: 0 | Status: SHIPPED | OUTPATIENT
Start: 2022-05-25 | End: 2022-06-04

## 2022-05-25 NOTE — PATIENT INSTRUCTIONS
Sinusitis   WHAT YOU NEED TO KNOW:   Sinusitis is inflammation or infection of your sinuses  Sinusitis is most often caused by a virus  Acute sinusitis may last up to 12 weeks  Chronic sinusitis lasts longer than 12 weeks  Recurrent sinusitis means you have 4 or more infections in 1 year  DISCHARGE INSTRUCTIONS:   Return to the emergency department if:   You have trouble breathing or wheezing that is getting worse  You have a stiff neck, a fever, or a bad headache  You cannot open your eye  Your eyeball bulges out or you cannot move your eye  You are more sleepy than normal, or you notice changes in your ability to think, move, or talk  You have swelling of your forehead or scalp  Call your doctor if:   You have vision changes, such as double vision  Your eye and eyelid are red, swollen, and painful  Your symptoms do not improve or go away after 10 days  You have nausea and are vomiting  Your nose is bleeding  You have questions or concerns about your condition or care  Medicines: Your symptoms may go away on their own  Your healthcare provider may recommend watchful waiting for up to 10 days before starting antibiotics  You may need any of the following:  Acetaminophen  decreases pain and fever  It is available without a doctor's order  Ask how much to take and how often to take it  Follow directions  Read the labels of all other medicines you are using to see if they also contain acetaminophen, or ask your doctor or pharmacist  Acetaminophen can cause liver damage if not taken correctly  Do not use more than 4 grams (4,000 milligrams) total of acetaminophen in one day  NSAIDs , such as ibuprofen, help decrease swelling, pain, and fever  This medicine is available with or without a doctor's order  NSAIDs can cause stomach bleeding or kidney problems in certain people   If you take blood thinner medicine, always ask your healthcare provider if NSAIDs are safe for you  Always read the medicine label and follow directions  Nasal steroid sprays  may help decrease inflammation in your nose and sinuses  Decongestants  help reduce swelling and drain mucus in the nose and sinuses  They may help you breathe easier  Antihistamines  help dry mucus in the nose and relieve sneezing  Antibiotics  help treat or prevent a bacterial infection  Take your medicine as directed  Contact your healthcare provider if you think your medicine is not helping or if you have side effects  Tell him or her if you are allergic to any medicine  Keep a list of the medicines, vitamins, and herbs you take  Include the amounts, and when and why you take them  Bring the list or the pill bottles to follow-up visits  Carry your medicine list with you in case of an emergency  Self-care:   Rinse your sinuses as directed  Use a sinus rinse device to rinse your nasal passages with a saline (salt water) solution or distilled water  Do not use tap water  This will help thin the mucus in your nose and rinse away pollen and dirt  It will also help reduce swelling so you can breathe normally  Use a humidifier  to increase air moisture in your home  This may make it easier for you to breathe and help decrease your cough  Sleep with your head elevated  Place an extra pillow under your head before you go to sleep to help your sinuses drain  Drink liquids as directed  Ask your healthcare provider how much liquid to drink each day and which liquids are best for you  Liquids will thin the mucus in your nose and help it drain  Avoid drinks that contain alcohol or caffeine  Do not smoke, and avoid secondhand smoke  Nicotine and other chemicals in cigarettes and cigars can make your symptoms worse  Ask your healthcare provider for information if you currently smoke and need help to quit  E-cigarettes or smokeless tobacco still contain nicotine   Talk to your healthcare provider before you use these products  Prevent the spread of germs:   Wash your hands often with soap and water  Wash your hands after you use the bathroom, change a child's diaper, or sneeze  Wash your hands before you prepare or eat food  Stay away from people who are sick  Some germs spread easily and quickly through contact  Follow up with your doctor as directed: You may be referred to an ear, nose, and throat specialist  Write down your questions so you remember to ask them during your visits  © Copyright [address] 2022 Information is for End User's use only and may not be sold, redistributed or otherwise used for commercial purposes  All illustrations and images included in CareNotes® are the copyrighted property of [employer]  or [facility] Sharon Flores   The above information is an  only  It is not intended as medical advice for individual conditions or treatments  Talk to your doctor, nurse or pharmacist before following any medical regimen to see if it is safe and effective for you  no

## 2022-05-25 NOTE — PROGRESS NOTES
Video Visit - Jeffy Rader 44 y o  female MRN: [id number]    REQUIRED DOCUMENTATION:         1  This service was provided via [employer]  2  Provider located at [address] [place] [phone]  3  [facility] provider: Fay Trinh PA-C   4  Identify all parties in room with patient during [facility] visit:  No one else  5  After connecting through [employer], patient was identified by name and date of birth  Patient was then informed that this was a Telemedicine visit and that the exam was being conducted confidentially over secure lines  My office door was closed  No one else was in the room  Patient acknowledged consent and understanding of privacy and security of the Telemedicine visit  I informed the patient that I have reviewed their record in Epic and presented the opportunity for them to ask any questions regarding the visit today  The patient agreed to participate  HPI  She was at the urgent care on Sunday  She had sinuses issues last week asthma was flared up  She was told to continue OTC meds for her sinuses and was prescribed prednisone on Sunday for her breathing  She has a lot of pressure in her face and ears  It is painful to sit up due to the pressure  She does feel like her chest isn't as tight and her cough is getting better  She has been taking covid test which are negative  The top of her teeth hurts as well  Physical Exam  Constitutional:       General: She is not in acute distress  Appearance: Normal appearance  She is not ill-appearing, toxic-appearing or diaphoretic  HENT:      Head: Normocephalic and atraumatic  Nose: Congestion present  Comments: TTP over sinuses  Pulmonary:      Effort: Pulmonary effort is normal  No respiratory distress  Comments: Talking in complete sentences,   Skin:     General: Skin is dry  Neurological:      General: No focal deficit present        Mental Status: She is alert and oriented to person, place, and time  Psychiatric:         Mood and Affect: Mood normal          Behavior: Behavior normal          Diagnoses and all orders for this visit:    Acute non-recurrent maxillary sinusitis  -     amoxicillin-clavulanate (AUGMENTIN) 875-125 mg per tablet; Take 1 tablet by mouth every 12 (twelve) hours for 10 days    continue with prednisone as directed by UC  Start antibiotics  Follow up with PCP  ER if worsen  Patient Instructions     Sinusitis   WHAT YOU NEED TO KNOW:   Sinusitis is inflammation or infection of your sinuses  Sinusitis is most often caused by a virus  Acute sinusitis may last up to 12 weeks  Chronic sinusitis lasts longer than 12 weeks  Recurrent sinusitis means you have 4 or more infections in 1 year  DISCHARGE INSTRUCTIONS:   Return to the emergency department if:   · You have trouble breathing or wheezing that is getting worse  · You have a stiff neck, a fever, or a bad headache  · You cannot open your eye  · Your eyeball bulges out or you cannot move your eye  · You are more sleepy than normal, or you notice changes in your ability to think, move, or talk  · You have swelling of your forehead or scalp  Call your doctor if:   · You have vision changes, such as double vision  · Your eye and eyelid are red, swollen, and painful  · Your symptoms do not improve or go away after 10 days  · You have nausea and are vomiting  · Your nose is bleeding  · You have questions or concerns about your condition or care  Medicines: Your symptoms may go away on their own  Your healthcare provider may recommend watchful waiting for up to 10 days before starting antibiotics  You may need any of the following:  · Acetaminophen  decreases pain and fever  It is available without a doctor's order  Ask how much to take and how often to take it  Follow directions   Read the labels of all other medicines you are using to see if they also contain acetaminophen, or ask your doctor or pharmacist  Acetaminophen can cause liver damage if not taken correctly  Do not use more than 4 grams (4,000 milligrams) total of acetaminophen in one day  · NSAIDs , such as ibuprofen, help decrease swelling, pain, and fever  This medicine is available with or without a doctor's order  NSAIDs can cause stomach bleeding or kidney problems in certain people  If you take blood thinner medicine, always ask your healthcare provider if NSAIDs are safe for you  Always read the medicine label and follow directions  · Nasal steroid sprays  may help decrease inflammation in your nose and sinuses  · Decongestants  help reduce swelling and drain mucus in the nose and sinuses  They may help you breathe easier  · Antihistamines  help dry mucus in the nose and relieve sneezing  · Antibiotics  help treat or prevent a bacterial infection  · Take your medicine as directed  Contact your healthcare provider if you think your medicine is not helping or if you have side effects  Tell him or her if you are allergic to any medicine  Keep a list of the medicines, vitamins, and herbs you take  Include the amounts, and when and why you take them  Bring the list or the pill bottles to follow-up visits  Carry your medicine list with you in case of an emergency  Self-care:   · Rinse your sinuses as directed  Use a sinus rinse device to rinse your nasal passages with a saline (salt water) solution or distilled water  Do not use tap water  This will help thin the mucus in your nose and rinse away pollen and dirt  It will also help reduce swelling so you can breathe normally  · Use a humidifier  to increase air moisture in your home  This may make it easier for you to breathe and help decrease your cough  · Sleep with your head elevated  Place an extra pillow under your head before you go to sleep to help your sinuses drain  · Drink liquids as directed    Ask your healthcare provider how much liquid to drink each day and which liquids are best for you  Liquids will thin the mucus in your nose and help it drain  Avoid drinks that contain alcohol or caffeine  · Do not smoke, and avoid secondhand smoke  Nicotine and other chemicals in cigarettes and cigars can make your symptoms worse  Ask your healthcare provider for information if you currently smoke and need help to quit  E-cigarettes or smokeless tobacco still contain nicotine  Talk to your healthcare provider before you use these products  Prevent the spread of germs:   · Wash your hands often with soap and water  Wash your hands after you use the bathroom, change a child's diaper, or sneeze  Wash your hands before you prepare or eat food  · Stay away from people who are sick  Some germs spread easily and quickly through contact  Follow up with your doctor as directed: You may be referred to an ear, nose, and throat specialist  Write down your questions so you remember to ask them during your visits  © Copyright [employer] 2022 Information is for End User's use only and may not be sold, redistributed or otherwise used for commercial purposes  All illustrations and images included in CareNotes® are the copyrighted property of [facility] [employer]  or [address] [place], [place] [address]kyle tristan   The above information is an  only  It is not intended as medical advice for individual conditions or treatments  Talk to your doctor, nurse or pharmacist before following any medical regimen to see if it is safe and effective for you

## 2022-05-25 NOTE — CARE ANYWHERE EVISITS
Visit Summary for Selvin Terry - Gender: Female - Date of Birth: [id number]  Date: [id number] - Duration: 5 minutes  Patient: Selvin Terry  Provider: Jason Nielsen PA-C    Patient Contact Information  Address  [address]; [address] [place] [address]  [phone]    Visit Topics  Sinuses  [Added By: Self - 2022-05-25]    Triage Questions   What is your current physical address in the event of a medical emergency? Answer []  Are you allergic to any medications? Answer []  Are you now or could you be pregnant? Answer []  Do you have any immune system compromise or chronic lung   disease? Answer []  Do you have any vulnerable family members in the home (infant, pregnant, cancer, elderly)? Answer []     Conversation Transcripts  [0A][0A] [Notification] You are connected with Jason Nielsen PA-C, Urgent Care Specialist [0A][Notification] Ronaldo Grayson is located in [place]  [0A][Notification] Ronaldo Grayson has shared health history  Marcie Izquierdo  [0A]    Diagnosis  Acute maxillary sinusitis, unspecified    Procedures  Value: [id number] Code: CPT-4 UNLISTED E&M SERVICE    Medications Prescribed    No prescriptions ordered    Electronically signed by: Minerva Pacheco(NPI [id number])

## 2022-06-02 RX ORDER — FLUOXETINE 10 MG/1
10 CAPSULE ORAL DAILY
Qty: 45 CAPSULE | Refills: 0 | Status: SHIPPED | OUTPATIENT
Start: 2022-06-02

## 2022-10-03 ENCOUNTER — TELEPHONE (OUTPATIENT)
Dept: OBGYN CLINIC | Facility: CLINIC | Age: 37
End: 2022-10-03

## 2022-10-03 NOTE — TELEPHONE ENCOUNTER
Pt lmom - wanted to schedule apt with [facility], has been having issues with period talked about having ablation last time there and was told to call back when was over it and will schedule something or see what next steps are to take care of it

## 2022-10-03 NOTE — TELEPHONE ENCOUNTER
Reviewed with patient, she has been having intermittent bleeding that is sometimes normal and then can be very heavy and clotty  She is not interested in starting OCP  Per ultrasound notes endo bx was recommended  Pt scheduled and aware can discuss options for the bleeding at that time as well  Not sure how she would like to proceed

## 2022-10-27 ENCOUNTER — AMB VIDEO VISIT (OUTPATIENT)
Dept: OTHER | Facility: HOSPITAL | Age: 37
End: 2022-10-27

## 2022-10-27 NOTE — CARE ANYWHERE EVISITS
Visit Summary for JOHN CLAY - Gender: Female - Date of Birth: [id number]  Date: [id number] - Duration: 3 minutes  Patient: Rosalva Solis   [address]  Provider: Olamide Langston    Patient Contact Information  [place] [facility] [address]  [phone]    Visit Topics    Triage Questions   What is your current physical address in the event of a medical emergency? Answer []  Are you allergic to any medications? Answer []  Are you now or could you be pregnant? Answer []  Do you have any immune system compromise or chronic lung   disease? Answer []  Do you have any vulnerable family members in the home (infant, pregnant, cancer, elderly)? Answer []     Conversation Transcripts  [0A][0A] [Notification] You are connected with Olamide Langston, Family Physician [0A][Notification] Evelina Hodgkins is located in [place]  [0A][Notification] Evelina Hodgkins has shared health history  Donaldconniesagarlesly Haro  [0A][Notification] Olamide Langston has added a   diagnosis/procedure code  [0A][Notification] Olamide Langston has added a prescription [0A][Notification] Kristine Ashleysage has added a prescription  [0A]    Diagnosis  Acute maxillary sinusitis, unspecified    Procedures    Medications Prescribed    benzonatate  Dose : 1 capsule  Route : oral  Frequency : 3 times a day  As needed  Until directed to stop  Refills : 0  Instructions to the Pharmacist : Substitutions allowed    prednisone  Dose : 2 tablets  Route : oral  Frequency : every day  Refills : 0  Instructions to the Pharmacist : Substitutions allowed      Provider Notes  [0A][0A] We strongly encourage you to share the following record of today's visit with your primary care physician  [0A]Contact phone number: as charted[0A]Mode of Communication: Video[0A]HPI: The patient has been having sinus pressure, post nasal drip,   mucous for a week  No fever, no swollen glands or sore throat  There is a slight cough but no shortness of breath  The patient has been achy and tired  [0A]PMH: reviewedMeds: flonaseAllergies: sulfadenies pregnancy or breastfeeding[0A]PE:Gen: Patient is   well appearing and in no acute distressEyes: Normal appearingNose: CongestedPharynx: Normal appearing tonsils and posterior pharynxResp: No respiratory distress[0A]Assessment: Sinusitis Diagnosis Code: acute sinusitis NOS J01 90[0A]Patient with typical   viral sinus infection symptoms including fullness, sinus pressure etc  Exam grossly normal  Discussed likely viral as opposed to bacterial etiology  Will try mucinex, Neti pot, trial of daily anti-allergy medication and anticipate that symptoms will   improve  Should the patient have high fevers, intense sinus pressure associated with teeth pain or initially improvement followed by recurrent or worsening symptoms, advised in-person exam[0A]Plan:1  Medication as described below  2  Discussed options   and precautions3  Recommend nasal saline, Neti Pot, plain Mucinex4  Sleeping with head/chest elevated can help with sinus drainage[0A]We are currently in the midst of the COVID-19 epidemic  Treatments prescribed may be outside the normal range of   Telemedicine practice  In light of the current difficulty in obtaining safe, conventional, community medical care, it is felt that the benefits of such treatment outweigh possible risks  A virtual visit does not replace a a regular visit contact with   PCP  Patient understands that we may treat empirically based on constellation of symptoms and reported findings Instructed to follow up with PCP if unchanged  Diagnosis and treatment plan was discussed  Always follow up if there is no improvement in 5-7   days  Seek emergency care or urgent care if symptoms acutely worsen[0A]Antibiotic indication: acute sinusitis Antibiotic choice:[0A]Follow up:1  Follow up in 5-7 days if not improving  Discussed precautions  2  If there are any questions or problems with   the prescription, call [phone] anytime for assistance  3   Please re-connect for another online visit or see an in-person provider should your symptoms worsen or not improving 5-7days  4  Taking a probiotic (either in pill form or by eating yogurt that   contains probiotics) while using antibiotics can help prevent some of the troublesome side effects that antibiotics can sometimes cause  5  Please print a copy of this note and send it to your regular doctor, or take it to your next visit so it may be   included in your medical record  [0A]Patient voiced understanding and agrees to plan  [0A]Please see your PCP on an annual basis[0A]    Electronically signed by:  [id number] Kristine Shukla(NPI [id number])

## 2022-10-28 ENCOUNTER — OFFICE VISIT (OUTPATIENT)
Dept: URGENT CARE | Facility: CLINIC | Age: 37
End: 2022-10-28
Payer: COMMERCIAL

## 2022-10-28 VITALS
HEART RATE: 73 BPM | OXYGEN SATURATION: 100 % | RESPIRATION RATE: 16 BRPM | HEIGHT: 62 IN | TEMPERATURE: 98 F | WEIGHT: 118 LBS | BODY MASS INDEX: 21.71 KG/M2

## 2022-10-28 DIAGNOSIS — J01.40 ACUTE PANSINUSITIS, RECURRENCE NOT SPECIFIED: Primary | ICD-10-CM

## 2022-10-28 PROCEDURE — 99213 OFFICE O/P EST LOW 20 MIN: CPT | Performed by: PHYSICIAN ASSISTANT

## 2022-10-28 RX ORDER — AMOXICILLIN AND CLAVULANATE POTASSIUM 875; 125 MG/1; MG/1
1 TABLET, FILM COATED ORAL EVERY 12 HOURS SCHEDULED
Qty: 14 TABLET | Refills: 0 | Status: SHIPPED | OUTPATIENT
Start: 2022-10-28 | End: 2022-11-04

## 2022-10-28 NOTE — PATIENT INSTRUCTIONS
Take antibiotics as prescribed for sinus infection  Use Flonase or nasal saline spray for symptomatic treatment  Stay hydrated with lots of water/fluids  Declined covid/flu testing  Follow-up with PCP in the next 1-2 days for reexamination and to ensure resolution of symptoms  Go to the ED if any fevers, unable to stay hydrated, abdominal pain, chest pain, shortness of breath, change in voice, pain or difficulty swallowing, new or worsening symptoms or other concerning symptoms

## 2022-10-28 NOTE — PROGRESS NOTES
[facility]        NAME: Jelani Robles is a 44 y o  female  : 1985    MRN: [id number]  DATE: 2022  TIME: 8:53 AM    Assessment and Plan   Acute pansinusitis, recurrence not specified [J01 40]  1  Acute pansinusitis, recurrence not specified  amoxicillin-clavulanate (AUGMENTIN) 875-125 mg per tablet         Patient Instructions     Take antibiotics as prescribed for sinus infection  Use Flonase or nasal saline spray for symptomatic treatment  Stay hydrated with lots of water/fluids  Declined covid/flu testing  Follow-up with PCP in the next 1-2 days for reexamination and to ensure resolution of symptoms  Go to the ED if any fevers, unable to stay hydrated, abdominal pain, chest pain, shortness of breath, change in voice, pain or difficulty swallowing, new or worsening symptoms or other concerning symptoms  Chief Complaint     Chief Complaint   Patient presents with   • Cold Like Symptoms     Pt did virtual visit and is on prednisone- she cannot get her sinuses to drain  She is c/o head pressure/ ear pain and facial pain  OTC meds are not working  History of Present Illness       26-year-old female presents for evaluation “sinus infection” x2 weeks  States she is having the sinus pressure, postnasal drip, runny nose/nasal congestion, bilateral ear pressure  States she has tried multiple over-the-counter medications such as nasal sprays, sinus medication, Mucinex with little improvement  States he did have a virtual visit and was put on prednisone for her asthma  She denies any wheezing, chest pain, chest tightness, cough, shortness of breath  States this feels like her previous sinus infections and states the Augmentin and antibiotics always helps her and is requesting that at this time  She denies any fevers, chills, body aches  States she took a COVID and flu test already that were negative, declines COVID/flu testing at this time    She denies any GI/ symptoms  She denies any pregnancy risk  Review of Systems   Review of Systems   Constitutional: Negative for activity change, appetite change, chills, fatigue and fever  HENT: Positive for congestion, ear pain, postnasal drip, rhinorrhea and sinus pressure  Negative for facial swelling, sore throat, trouble swallowing and voice change  Eyes: Negative for discharge, itching and visual disturbance  Respiratory: Negative for cough, chest tightness, shortness of breath and wheezing  Cardiovascular: Negative for chest pain  Gastrointestinal: Negative for abdominal pain, diarrhea, nausea and vomiting  Musculoskeletal: Negative for back pain and neck pain  Skin: Negative for rash  Neurological: Negative for dizziness, syncope, weakness, numbness and headaches  All other systems reviewed and are negative  Current Medications       Current Outpatient Medications:   •  albuterol (PROVENTIL HFA,VENTOLIN HFA) 90 mcg/act inhaler, Inhale 2 puffs every 6 (six) hours as needed for wheezing, Disp: , Rfl:   •  amoxicillin-clavulanate (AUGMENTIN) 875-125 mg per tablet, Take 1 tablet by mouth every 12 (twelve) hours for 7 days, Disp: 14 tablet, Rfl: 0  •  cetirizine (ZyrTEC) 10 MG chewable tablet, Chew 10 mg daily, Disp: , Rfl:   •  FLUoxetine (PROzac) 10 mg capsule, Take 1 capsule (10 mg total) by mouth daily The week prior and week of menses   (Patient not taking: Reported on 10/28/2022), Disp: 45 capsule, Rfl: 0    Current Allergies     Allergies as of 10/28/2022 - Reviewed 10/28/2022   Allergen Reaction Noted   • Sulfa antibiotics  03/10/2016            The following portions of the patient's history were reviewed and updated as appropriate: allergies, current medications, past family history, past medical history, past social history, past surgical history and problem list      Past Medical History:   Diagnosis Date   • Anxiety    • Asthma    • Chronic kidney disease     stones   • Varicella childhood       Past Surgical History:   Procedure Laterality Date   • NM SURG RX MISSED ABORTN,1ST TRI N/A 3/11/2016    Procedure: DILATATION AND EVACUATION (D&E); Surgeon: Je Ty MD;  Location: [facility];  Service: Gynecology   • RECONSTRUCTIVE REPAIR STERNAL  2005    leg       Family History   Problem Relation Age of Onset   • Hyperlipidemia Mother    • Hyperlipidemia Father    • Hypertension Father    • Heart disease Father    • Cancer Maternal Uncle    • Cancer Paternal Uncle    • Diabetes Paternal Uncle    • Cancer Maternal Grandmother    • Vision loss Maternal Grandfather    • Heart disease Paternal Grandfather    • Diabetes Paternal Uncle          Medications have been verified  Objective   Pulse 73   Temp 98 °F (36 7 °C)   Resp 16   Ht 5' 2" (1 575 m)   Wt 53 5 kg (118 lb)   SpO2 100%   BMI 21 58 kg/m²        Physical Exam     Physical Exam  Vitals and nursing note reviewed  Constitutional:       General: She is not in acute distress  Appearance: Normal appearance  She is well-developed  She is not ill-appearing  HENT:      Head: Normocephalic and atraumatic  Right Ear: Tympanic membrane normal       Left Ear: Tympanic membrane normal       Nose:      Right Sinus: Maxillary sinus tenderness and frontal sinus tenderness present  Left Sinus: Maxillary sinus tenderness and frontal sinus tenderness present  Mouth/Throat:      Mouth: Mucous membranes are moist       Pharynx: Oropharynx is clear  Uvula midline  No oropharyngeal exudate, posterior oropharyngeal erythema or uvula swelling  Comments: Mild postnasal drip  Eyes:      Pupils: Pupils are equal, round, and reactive to light  Cardiovascular:      Rate and Rhythm: Normal rate and regular rhythm  Heart sounds: Normal heart sounds  Pulmonary:      Effort: Pulmonary effort is normal  No respiratory distress  Breath sounds: Normal breath sounds  No wheezing     Musculoskeletal:      Cervical back: Normal range of motion and neck supple  Skin:     Capillary Refill: Capillary refill takes less than 2 seconds  Neurological:      Mental Status: She is alert and oriented to person, place, and time     Psychiatric:         Behavior: Behavior normal

## 2022-11-02 ENCOUNTER — HOSPITAL ENCOUNTER (OUTPATIENT)
Dept: RADIOLOGY | Facility: MEDICAL CENTER | Age: 37
Discharge: HOME | End: 2022-11-02

## 2022-11-02 VITALS — BODY MASS INDEX: 21.71 KG/M2 | WEIGHT: 118 LBS | HEIGHT: 62 IN

## 2022-11-02 DIAGNOSIS — Z80.3 FAMILY HISTORY OF BREAST CANCER: ICD-10-CM

## 2022-11-02 DIAGNOSIS — Z12.31 ENCOUNTER FOR SCREENING MAMMOGRAM FOR MALIGNANT NEOPLASM OF BREAST: ICD-10-CM

## 2022-11-14 ENCOUNTER — PROCEDURE VISIT (OUTPATIENT)
Dept: OBGYN CLINIC | Facility: CLINIC | Age: 37
End: 2022-11-14

## 2022-11-14 VITALS
BODY MASS INDEX: 23 KG/M2 | DIASTOLIC BLOOD PRESSURE: 70 MMHG | HEIGHT: 62 IN | WEIGHT: 125 LBS | SYSTOLIC BLOOD PRESSURE: 110 MMHG

## 2022-11-14 DIAGNOSIS — B97.7 HIGH RISK HPV INFECTION: ICD-10-CM

## 2022-11-14 DIAGNOSIS — N93.9 ABNORMAL UTERINE BLEEDING: Primary | ICD-10-CM

## 2022-11-14 NOTE — PROGRESS NOTES
Assessment/Plan   Diagnoses and all orders for this visit:    Abnormal uterine bleeding  -     IGP, Aptima HPV, Rfx 16/18,45  -     Pathology Report    High risk HPV infection  -     IGP, Aptima HPV, Rfx 16/18,45    Discussion  No intercourse, tampon use, soaking in bath tub, or swimming for the next 2-3 days to avoid risk of infection  Call office with excessive bleeding, cramping, fever, or chills  Will call with results to review  Reviewed options including but not limited to OTC NSAIDs, prescriptions NSAIDs, lysteda, combination OCP, Mirena IUD, endometrial ablation  Patient will research more on Mirena IUD - pamphlet and office handout provided; leaning more towards ablation at this point  Discussed Mirena IUD as an option in detail  The following risks were reviewed: if infected with a sexually transmitted infection such as chlamydia or gonorrhea the risk for pelvic inflammatory disease may be greater, as well as for resulting chronic pelvic pain or infertility  Emphasized need to continue with safe sex practices with condom use  Also reviewed possible dysfunction bleeding for a few months after insertion, the possibility of expulsion, the risk of ectopic pregnancy if patient were to conceive with the IUD in place  Form provided in regards to coverage, ordering, and scheduling  All questions answered at this time  Patient to contact the office with any further questions/concerns  RTO for APE as scheduled or sooner if needed    Subjective     HPI   Aicha Ibarra is a 44 y o  female who presents for irregular bleeding  2/3/21 - CBC/thyroid WNL; 2/3/21 - pelvic ultrasound unremarkable  Menarche - 12; LMP - 10/19/22; Periods are usually reg q 25-28 days and last 5 days; Two days before her period she'll start with intense pelvic cramping - mostly RLQ - can be knife like pain at times - takes 2-3 ibuprofen which doesn't help much   First day of period - spotting; second day regular flow, will stop for 6 hours; then intense knife like cramping with passage of large clots; will stop again for 10-12 hours, followed by another round of intense cramping and passage of large clots; Last day, just spotting; The actual period flow is not excessive - doesn't bleed out of pads or tampons, ok overnight  For the past year, she has been experiencing the cramping and spotting for about 2 days after intercourse  She has also been experiencing it after more intense work-outs - doug if working her pelvic floor or leg exercises; She is not interested in hormonal therapy - was on OCP from about 15 yrs old until about age 33 when she had her first pregnancy- experienced hot flashes and severe vaginal dryness  No menopausal symptoms: No hot flashes/night sweats, problems with intercourse, vaginal dryness; sleeping well  No abd/pelvic pain or HAs; No bowel or bladder issues  Pt is sexually active in a mutually monog/ sexual relationship; No issues with intercourse; She declines sti/hiv/hep testing; Feels safe at home  Current contraception:  vasectomy    Last Pap - 3/2/21 - WNL (+) HPV type 16/18/45 neg; History of abnormal Pap smear: no  Last STI screen - C/G/T neg  Last mammo - 11/2/22 - Birads 1 negative    Review of Systems   Constitutional: Negative for activity change, fatigue, fever and unexpected weight change  HENT: Negative for congestion, dental problem, sinus pressure and sinus pain  Eyes: Negative for visual disturbance  Respiratory: Negative for cough, shortness of breath and wheezing  Cardiovascular: Negative for chest pain and leg swelling  Gastrointestinal: Negative for abdominal distention, abdominal pain, blood in stool, constipation, diarrhea, nausea and vomiting  Endocrine: Negative for polydipsia     Genitourinary: Negative for difficulty urinating, dyspareunia, dysuria, frequency, hematuria, menstrual problem, pelvic pain, urgency, vaginal bleeding, vaginal discharge and vaginal pain    Musculoskeletal: Negative for arthralgias and back pain  Allergic/Immunologic: Negative for environmental allergies  Neurological: Negative for dizziness, seizures and headaches  Psychiatric/Behavioral: Negative for dysphoric mood and sleep disturbance  The patient is not nervous/anxious  The following portions of the patient's history were reviewed and updated as appropriate: allergies, current medications, past family history, past medical history, past social history, past surgical history and problem list          Past Medical History:   Diagnosis Date   • Anxiety    • Asthma    • Chronic kidney disease     stones   • Miscarriage 2016   • Varicella     childhood       Past Surgical History:   Procedure Laterality Date   • MS SURG RX MISSED ABORTN,1ST TRI N/A 3/11/2016    Procedure: DILATATION AND EVACUATION (D&E);   Surgeon: Vicky Rogers MD;  Location: [facility];  Service: Gynecology   • RECONSTRUCTIVE REPAIR STERNAL  2005    leg       Family History   Problem Relation Age of Onset   • Hyperlipidemia Mother    • Hyperlipidemia Father    • Hypertension Father    • Heart disease Father    • Skin cancer Father    • Breast cancer Maternal Grandmother         45s   • Cancer Maternal Grandmother    • Vision loss Maternal Grandfather    • Heart disease Paternal Grandfather    • Endometrial cancer Maternal Uncle    • Cancer Maternal Uncle    • Cancer Paternal Uncle    • Diabetes Paternal Uncle    • Throat cancer Paternal Uncle    • Lung cancer Paternal Uncle    • Diabetes Paternal Uncle    • BRCA1 Positive Maternal Aunt    • BRCA1 Positive Maternal Aunt    • No Known Problems Maternal Aunt    • No Known Problems Maternal Aunt        Social History     Socioeconomic History   • Marital status: /Civil Union     Spouse name: Not on file   • Number of children: Not on file   • Years of education: Not on file   • Highest education level: Not on file   Occupational History   • Not on file   Tobacco Use   • Smoking status: Never Smoker   • Smokeless tobacco: Never Used   Vaping Use   • Vaping Use: Never used   Substance and Sexual Activity   • Alcohol use: Yes     Alcohol/week: 3 0 standard drinks     Types: 3 Glasses of wine per week     Comment: occasional   • Drug use: No   • Sexual activity: Yes     Partners: Male     Birth control/protection: Male Sterilization   Other Topics Concern   • Not on file   Social History Narrative   • Not on file     Social Determinants of Health     Financial Resource Strain: Not on file   Food Insecurity: Not on file   Transportation Needs: Not on file   Physical Activity: Not on file   Stress: Not on file   Social Connections: Not on file   Intimate Partner Violence: Not on file   Housing Stability: Not on file         Current Outpatient Medications:   •  albuterol (PROVENTIL HFA,VENTOLIN HFA) 90 mcg/act inhaler, Inhale 2 puffs every 6 (six) hours as needed for wheezing, Disp: , Rfl:   •  cetirizine (ZyrTEC) 10 MG chewable tablet, Chew 10 mg daily, Disp: , Rfl:     Allergies   Allergen Reactions   • Sulfa Antibiotics      Unknown, childhood alllergy       Objective   Vitals:    11/14/22 0825   BP: 110/70   BP Location: Left arm   Patient Position: Sitting   Cuff Size: Standard   Weight: 56 7 kg (125 lb)   Height: 5' 2" (1 575 m)       Physical Exam  Vitals reviewed  Constitutional:       General: She is awake  She is not in acute distress  Appearance: Normal appearance  She is well-developed, well-groomed and normal weight  She is not ill-appearing, toxic-appearing or diaphoretic  HENT:      Head: Normocephalic and atraumatic  Eyes:      Conjunctiva/sclera: Conjunctivae normal    Abdominal:      General: There is no distension  Palpations: Abdomen is soft  There is no hepatomegaly, splenomegaly or mass  Tenderness: There is no abdominal tenderness  Hernia: No hernia is present  There is no hernia in the left inguinal area or right inguinal area  Genitourinary:     General: Normal vulva  Exam position: Supine  Pubic Area: No rash or pubic lice  Labia:         Right: No rash, tenderness, lesion or injury  Left: No rash, tenderness, lesion or injury  Urethra: No prolapse, urethral pain, urethral swelling or urethral lesion  Vagina: Normal  No signs of injury and foreign body  No vaginal discharge, erythema, tenderness, bleeding, lesions or prolapsed vaginal walls  Cervix: No cervical motion tenderness, discharge, friability, lesion, erythema or cervical bleeding  Uterus: Not deviated, not enlarged, not fixed, not tender and no uterine prolapse  Adnexa:         Right: No mass, tenderness or fullness  Left: No mass, tenderness or fullness  Lymphadenopathy:      Lower Body: No right inguinal adenopathy  No left inguinal adenopathy  Skin:     General: Skin is warm and dry  Neurological:      Mental Status: She is alert and oriented to person, place, and time  Psychiatric:         Mood and Affect: Mood and affect normal          Speech: Speech normal          Behavior: Behavior normal  Behavior is cooperative  Thought Content: Thought content normal          Judgment: Judgment normal        Endometrial biopsy    Date/Time: 11/14/2022 9:25 AM  Performed by: Angelique Whitney PA-C  Authorized by: Shannon Mims MD   Universal Protocol:  Procedure performed by: Oneal Dominguez NP)  Consent: Verbal consent obtained    Risks and benefits: risks, benefits and alternatives were discussed  Consent given by: patient  Patient understanding: patient states understanding of the procedure being performed  Patient identity confirmed: verbally with patient      Indication:     Indications: Post-coital bleeding and Other disorder of menstruation and other abnormal bleeding from female genital tract      Chronicity of post-coital bleeding:  New    Progression of post-coital bleeding:  Unchanged  Pre-procedure: Premeds:  Ibuprofen  Procedure:     Procedure: endometrial biopsy with Pipelle      A bivalve speculum was placed in the vagina: yes      Cervix cleaned and prepped: yes      Uterus sounded: yes      Uterus sound depth (cm):  7    Specimen collected: specimen collected and sent to pathology      Patient tolerated procedure well with no complications: yes    Findings:     Cervix: normal    Comments:     Procedure comments:  Patient denied any allergy to betadine or shellfish  Cervix washed with betadine cotton swabs x 3; Cervix clamped with an Allis  Successfully inserted endometrial pipelle to about 7 cm  Endometrial sample collected x 2  Sample sent to pathology  Office will call with results and appropriate follow up  No intercourse, tampon use, soaking in bath tub, or swimming for the next 2-3 days to avoid risk of infection  Call office with excessive bleeding, cramping, fever, or chills  There are no Patient Instructions on file for this visit

## 2022-11-17 LAB
PATH REPORT.COMMENTS IMP SPEC: NORMAL
PATH REPORT.SITE OF ORIGIN SPEC: NORMAL
PAYMENT PROCEDURE: NORMAL
SL AMB .: NORMAL

## 2022-11-21 DIAGNOSIS — N72: Primary | ICD-10-CM

## 2022-11-21 RX ORDER — DOXYCYCLINE HYCLATE 100 MG/1
100 CAPSULE ORAL EVERY 12 HOURS SCHEDULED
Qty: 20 CAPSULE | Refills: 0 | Status: SHIPPED | OUTPATIENT
Start: 2022-11-21 | End: 2022-12-01

## 2022-11-22 LAB
CYTOLOGIST CVX/VAG CYTO: NORMAL
DX ICD CODE: NORMAL
HPV GENOTYPE REFLEX: NORMAL
HPV I/H RISK 4 DNA CVX QL PROBE+SIG AMP: NEGATIVE
Lab: NORMAL
OTHER STN SPEC: NORMAL
PATH REPORT.FINAL DX SPEC: NORMAL
SL AMB NOTE:: NORMAL
SL AMB SPECIMEN ADEQUACY: NORMAL
SL AMB TEST METHODOLOGY: NORMAL

## 2023-01-09 NOTE — PROGRESS NOTES
Assessment/Plan   Problem List Items Addressed This Visit        Genitourinary    Abnormal uterine bleeding       Other    PMDD (premenstrual dysphoric disorder)   Other Visit Diagnoses     Well woman exam    -  Primary          Discussion  I have discussed the importance of monthly self-breast exams, exercise and healthy diet as well as adequate intake of calcium and vitamin D  Encourage safe sexual practices; STI testing - declines   Contraception - vasectomy     The current ASCCP guidelines were reviewed  Patient's last pap was in 2022 and therefore, a pap with HPV cotesting is not indicated at this time  I emphasized the importance of an annual pelvic and breast exam      Mammogram is UTD  All questions have been answered to her satisfaction  RTO for APE or sooner if needed      Subjective     HPI   Sky Mehta is a 44 y o  female who presents for annual well woman exam      LMP - 01/03/23; Periods are reg q 28 days and last 5 days; No excessive bleeding; Cramps are excessive with clotting and pelvic pain  Recent work-up with pelvic US and endometrial biopsy showed chronic endocervicitis  Appt with Dr Pooja Hamilton to discuss surgery  No vulvar itch/burn; No vaginal itch/burn; No abn discharge or odor; No urinary sx - burning/pain/frequency/hematuria    (+) SBEs - no breast masses, asymmetry, nipple discharge or bleeding, changes in skin of breast, or breast tenderness bilaterally    No menopausal symptoms: No hot flashes/night sweats, problems with intercourse, vaginal dryness; sleeping well;     Pt is sexually active in a mutually monog/ sexual relationship; No issues with intercourse; She declines sti/hiv/hep testing; Feels safe at home  Current contraception: vasectomy    (+) PCP for routine Bw/care; Last Pap - 11/2022 NILM/-HPV  History of abnormal Pap smear: yes    Review of Systems   Constitutional: Negative for fatigue and fever     Eyes: Negative for photophobia and visual disturbance  Respiratory: Negative for cough and shortness of breath  Cardiovascular: Negative for chest pain and palpitations  Gastrointestinal: Negative for abdominal pain, blood in stool, constipation, diarrhea, nausea and rectal pain  Genitourinary: Negative for dyspareunia, dysuria, flank pain, frequency, genital sores, menstrual problem, pelvic pain, urgency, vaginal bleeding, vaginal discharge and vaginal pain  Musculoskeletal: Negative for arthralgias and back pain  Skin: Negative for rash  Neurological: Negative for weakness and headaches  The following portions of the patient's history were reviewed and updated as appropriate: allergies, current medications, past family history, past medical history, past social history and past surgical history  OB History        3    Para   3    Term   2       1    AB   0    Living   2       SAB   0    IAB   0    Ectopic        Multiple   0    Live Births   2           Obstetric Comments   : 2 SVDs; 2 SAB one with D&E and one natural;              Past Medical History:   Diagnosis Date   • Anxiety    • Asthma    • Chronic kidney disease     stones   • Miscarriage    • Varicella     childhood       Past Surgical History:   Procedure Laterality Date   • [place] TX MISSED  FIRST TRIMESTER SURGICAL N/A 3/11/2016    Procedure: DILATATION AND EVACUATION (D&E);   Surgeon: Frank Rodríguez MD;  [facility];  Service: Gynecology   • RECONSTRUCTIVE REPAIR STERNAL  2005    leg       Family History   Problem Relation Age of Onset   • Hyperlipidemia Mother    • Hyperlipidemia Father    • Hypertension Father    • Heart disease Father    • Skin cancer Father    • Breast cancer Maternal Grandmother         45s   • Cancer Maternal Grandmother    • Vision loss Maternal Grandfather    • Heart disease Paternal Grandfather    • Endometrial cancer Maternal Uncle    • Cancer Maternal Uncle    • Cancer Paternal Uncle    • Diabetes Paternal Uncle    • Throat cancer Paternal Uncle    • Lung cancer Paternal Uncle    • Diabetes Paternal Uncle    • BRCA1 Positive Maternal Aunt    • BRCA1 Positive Maternal Aunt    • No Known Problems Maternal Aunt    • No Known Problems Maternal Aunt        Social History     Socioeconomic History   • Marital status: /Civil Union     Spouse name: Not on file   • Number of children: Not on file   • Years of education: Not on file   • Highest education level: Not on file   Occupational History   • Not on file   Tobacco Use   • Smoking status: Never   • Smokeless tobacco: Never   Vaping Use   • Vaping Use: Never used   Substance and Sexual Activity   • Alcohol use: Yes     Alcohol/week: 3 0 standard drinks     Types: 3 Glasses of wine per week     Comment: occasional   • Drug use: No   • Sexual activity: Yes     Partners: Male     Birth control/protection: Male Sterilization   Other Topics Concern   • Not on file   Social History Narrative   • Not on file     Social Determinants of Health     Financial Resource Strain: Not on file   Food Insecurity: Not on file   Transportation Needs: Not on file   Physical Activity: Not on file   Stress: Not on file   Social Connections: Not on file   Intimate Partner Violence: Not on file   Housing Stability: Not on file         Current Outpatient Medications:   •  albuterol (PROVENTIL HFA,VENTOLIN HFA) 90 mcg/act inhaler, Inhale 2 puffs every 6 (six) hours as needed for wheezing, Disp: , Rfl:   •  cetirizine (ZyrTEC) 10 MG chewable tablet, Chew 10 mg daily, Disp: , Rfl:     Allergies   Allergen Reactions   • Sulfa Antibiotics      Unknown, childhood alllergy       Objective   Vitals:    01/16/23 0857   BP: 116/74   BP Location: Left arm   Patient Position: Sitting   Cuff Size: Standard   Weight: 57 2 kg (126 lb)   Height: 5' 2" (1 575 m)     Physical Exam  Vitals and nursing note reviewed  Constitutional:       Appearance: Normal appearance   She is well-developed and normal weight  HENT:      Head: Normocephalic and atraumatic  Eyes:      Conjunctiva/sclera: Conjunctivae normal    Cardiovascular:      Rate and Rhythm: Normal rate and regular rhythm  Heart sounds: Normal heart sounds  Pulmonary:      Effort: Pulmonary effort is normal       Breath sounds: Normal breath sounds  Chest:   Breasts:     Breasts are symmetrical       Right: Normal  No inverted nipple, mass, nipple discharge, skin change or tenderness  Left: Normal  No inverted nipple, mass, nipple discharge, skin change or tenderness  Abdominal:      General: Abdomen is flat  There is no distension  Palpations: Abdomen is soft  There is no mass  Tenderness: There is no abdominal tenderness  There is no right CVA tenderness or left CVA tenderness  Genitourinary:     General: Normal vulva  Exam position: Lithotomy position  Pubic Area: No rash or pubic lice  Labia:         Right: No rash, tenderness, lesion or injury  Left: No rash, tenderness, lesion or injury  Urethra: No prolapse, urethral pain or urethral lesion  Vagina: Normal  No signs of injury and foreign body  No vaginal discharge, erythema, tenderness or bleeding  Cervix: No cervical motion tenderness or erythema  Uterus: Normal  No uterine prolapse  Adnexa: Right adnexa normal and left adnexa normal         Right: No mass or tenderness  Left: No mass or tenderness  Musculoskeletal:         General: No tenderness  Normal range of motion  Cervical back: Normal range of motion  No tenderness  Right lower leg: No edema  Left lower leg: No edema  Lymphadenopathy:      Cervical: No cervical adenopathy  Upper Body:      Right upper body: No supraclavicular, axillary or pectoral adenopathy  Left upper body: No supraclavicular, axillary or pectoral adenopathy  Lower Body: No right inguinal adenopathy  No left inguinal adenopathy     Skin: General: Skin is warm and dry  Neurological:      Mental Status: She is alert and oriented to person, place, and time  Motor: No weakness  Psychiatric:         Mood and Affect: Mood normal          Behavior: Behavior normal          Thought Content: Thought content normal          Judgment: Judgment normal          There are no Patient Instructions on file for this visit

## 2023-01-16 ENCOUNTER — ANNUAL EXAM (OUTPATIENT)
Dept: OBGYN CLINIC | Facility: CLINIC | Age: 38
End: 2023-01-16

## 2023-01-16 VITALS
DIASTOLIC BLOOD PRESSURE: 74 MMHG | WEIGHT: 126 LBS | BODY MASS INDEX: 23.19 KG/M2 | SYSTOLIC BLOOD PRESSURE: 116 MMHG | HEIGHT: 62 IN

## 2023-01-16 DIAGNOSIS — Z01.419 WELL WOMAN EXAM: Primary | ICD-10-CM

## 2023-01-16 DIAGNOSIS — F32.81 PMDD (PREMENSTRUAL DYSPHORIC DISORDER): ICD-10-CM

## 2023-01-16 DIAGNOSIS — N93.9 ABNORMAL UTERINE BLEEDING: ICD-10-CM

## 2023-01-18 ENCOUNTER — OFFICE VISIT (OUTPATIENT)
Dept: OBGYN CLINIC | Facility: CLINIC | Age: 38
End: 2023-01-18

## 2023-01-18 VITALS
HEIGHT: 62 IN | BODY MASS INDEX: 23.04 KG/M2 | DIASTOLIC BLOOD PRESSURE: 78 MMHG | WEIGHT: 125.2 LBS | SYSTOLIC BLOOD PRESSURE: 120 MMHG

## 2023-01-18 DIAGNOSIS — N93.9 ABNORMAL UTERINE BLEEDING: Primary | ICD-10-CM

## 2023-01-18 NOTE — H&P
History & Physical - OB/GYN   Everett  44 y o  female MRN: [id number]  Unit/Bed#:  Encounter: [id number]    HPI:  Everett  is a 44 y o  [id number] with AUB presenting for surgical consultation- patient reports heavy menses with passage of clots that are quite crampy- she has been dealing with worsening episodes for the past 2 years  She reports cramping particularly on the right side  She was taking OCPs years ago to help with pelvic pain and regulate menses, however, secondary to a history of breast cancer in her Mother and Aunt she declines hormonal forms of birth control  Lidia Wade had a recent EMB completed showing possible polyp and chronic endocervicitis and was treated with doxycycline- she has not noted any change in bleeding following biopsy or treatment  Patient has been counseled previously by TORIE Bridges- she is most interested in [facility]  hysteroscopy and endometrial ablation  Last pap: 2022- NILM, HPV neg  EMB: No hyperplasia or malignancy, possible polyp, possible chronic endocervicitis- tx with doxycyclcine  Contraception: Vasectomy    Active Problems:  Patient Active Problem List   Diagnosis   • Asthma   • Anxiety disorder   • Allergic rhinitis   • Abnormal uterine bleeding   • PMDD (premenstrual dysphoric disorder)     PMH:  Past Medical History:   Diagnosis Date   • Anxiety    • Asthma    • Chronic kidney disease     stones   • Miscarriage    • Varicella     childhood     PSH:  Past Surgical History:   Procedure Laterality Date   • [place] TX MISSED  FIRST TRIMESTER SURGICAL N/A 3/11/2016    Procedure: DILATATION AND EVACUATION (D&E);   Surgeon: Lior Montgomery MD;  [facility];  Service: Gynecology   • RECONSTRUCTIVE REPAIR STERNAL  2005    leg     Social Hx:  Denies x3    OB Hx:  OB History    Para Term  AB Living   3 2 1 1 1 2   SAB IAB Ectopic Multiple Live Births   1 0 0 0 2      # Outcome Date GA Lbr Byron/2nd Weight Sex Delivery Anes PTL Lv   3 Term 17 40w2d 04:20 / 00:58 3255 g (7 lb 2 8 oz) M Vag-Spont EPI N ANA LUISA      Name: Siena Nettles: 9  Apgar5: 9   2  14 34w0d  2410 g (5 lb 5 oz) M Vag-Spont EPI Y ANA LUISA      Complications: Abruptio placenta   1 SAB               Obstetric Comments   : 2 SVDs; 2 SAB one with D&E and one natural;        Meds:  Current Outpatient Medications on File Prior to Visit   Medication Sig Dispense Refill   • albuterol (PROVENTIL HFA,VENTOLIN HFA) 90 mcg/act inhaler Inhale 2 puffs every 6 (six) hours as needed for wheezing     • cetirizine (ZyrTEC) 10 MG chewable tablet Chew 10 mg daily       No current facility-administered medications on file prior to visit  Family History   Problem Relation Age of Onset   • Hyperlipidemia Mother    • Hyperlipidemia Father    • Hypertension Father    • Heart disease Father    • Skin cancer Father    • Breast cancer Maternal Grandmother         45s   • Cancer Maternal Grandmother    • Vision loss Maternal Grandfather    • Heart disease Paternal Grandfather    • Endometrial cancer Maternal Uncle    • Cancer Maternal Uncle    • Cancer Paternal Uncle    • Diabetes Paternal Uncle    • Throat cancer Paternal Uncle    • Lung cancer Paternal Uncle    • Diabetes Paternal Uncle    • BRCA1 Positive Maternal Aunt    • BRCA1 Positive Maternal Aunt    • No Known Problems Maternal Aunt    • No Known Problems Maternal Aunt      Allergies: Allergies   Allergen Reactions   • Sulfa Antibiotics      Unknown, childhood alllergy     Review of Systems   Constitutional: Negative for chills and fever  Eyes: Negative for visual disturbance  Respiratory: Negative for chest tightness, shortness of breath and wheezing  Cardiovascular: Negative for chest pain, palpitations and leg swelling  Gastrointestinal: Negative for abdominal pain, constipation, diarrhea, nausea and vomiting  Genitourinary: Positive for menstrual problem and pelvic pain   Negative for dysuria, flank pain, frequency, hematuria, urgency and vaginal discharge  Musculoskeletal: Negative for arthralgias and myalgias  Neurological: Negative for dizziness, weakness, light-headedness and headaches  Physical Exam:  /78   Ht 5' 2" (1 575 m)   Wt 56 8 kg (125 lb 3 2 oz)   LMP 01/03/2023 (Approximate)   BMI 22 90 kg/m²     Physical Exam  Vitals reviewed  Constitutional:       General: She is not in acute distress  Appearance: Normal appearance  She is well-developed  She is not ill-appearing or diaphoretic  HENT:      Head: Normocephalic and atraumatic  Cardiovascular:      Rate and Rhythm: Normal rate and regular rhythm  Heart sounds: Normal heart sounds  No murmur heard  No friction rub  No gallop  Pulmonary:      Effort: Pulmonary effort is normal  No respiratory distress  Breath sounds: Normal breath sounds  No wheezing or rales  Abdominal:      Palpations: Abdomen is soft  Tenderness: There is no abdominal tenderness  There is no guarding or rebound  Genitourinary:     Vagina: Normal    Musculoskeletal:      Cervical back: Normal range of motion and neck supple  Right lower leg: No edema  Left lower leg: No edema  Skin:     General: Skin is warm and dry  Neurological:      Mental Status: She is alert and oriented to person, place, and time  Psychiatric:         Behavior: Behavior normal        Assessment:   44 y o  [id number] with AUB likely secondary to polyp presenting for surgical discussion and evaluation- patient had endometrial biopsy completed showing possible polyp- no hyperplasia or malignancy  We reviewed hormonal forms of contraception particularly touching on Mirena IUD and she would prefer to not have treatment with anything hormonal and declines these therapies- we also reviewed Lysteda as a non hormonal option to assist with AUB, however, given possible polyp I do agree with at least doing hysteroscopy D&C      We reviewed hysteroscopy D&C alone versus hysteroscopy D&C with Rosmery endometrial ablation and definitive management via hysterectomy  Patient at this time is not interested in hysterectomy particularly with the prolonged healing course- she is leaning toward endometrial ablation  We reviewed risks of hysteroscopy D&C and risks of bleeding, infection, uterine perforation and injury to surrounding structures of the pelvis- we reviewed for endometrial ablation that amenorrhea is not the goal but is sometimes achieved by patients we discussed that the goal is normal menses or at least lightened menses  She understands she may have ongoing pelvic pain or worsening pelvic pain following ablation  We reviewed the difficulty of future sampling of the uterus following ablation if necessary  She does have a reliable form of contraception and understands pregnancy is not recommended following endometrial ablation- she also understands she may begin to have AUB later on and the "lifetime" of an ablation is about 5 years  Perla Morris had an opportunity to ask questions which were answered to the best of my ability and informed consent was signed today  Reviewed NPO after midnight day prior to surgery- surgical wash given and instructions reviewed, same day procedure and need for transportation, reviewed healing time and post op pain management plan  Discussed next steps of surgical scheduling and that the office will be reaching out      Nael Fitzpatrick MD  01/18/23

## 2023-01-18 NOTE — PATIENT INSTRUCTIONS
Endometrial Ablation   AMBULATORY CARE:   What you need to know about endometrial ablation:  Endometrial ablation is a procedure to remove the endometrium (lining of your uterus)  You may need this procedure if you have heavy or abnormal vaginal bleeding  How to prepare for the procedure: Your healthcare provider will talk to you about how to prepare for the procedure  You may be told not to eat or drink anything after midnight on the day of your procedure  You will also be told what medicines to take or not take on the day of your procedure  You may need someone to drive you home after the procedure and stay with you to make sure you are okay  What will happen during the procedure: You may be given local anesthesia to numb the area  You may instead be given general anesthesia to keep you asleep and free from pain during the procedure  Your healthcare provider will widen the opening of your cervix with medicine or medical tools  Ice, heated fluid, or electric energy may be used to remove the lining of your uterus  What to expect after the procedure: You may feel some discomfort for a few days  This is normal and should stop soon  Contact your healthcare provider if any of the following becomes severe or continues:  Cramps, similar to menstrual cramps, for 1 to 2 days    Watery, bloody discharge for 2 to 3 days that may become light and last a few weeks    Frequent urination for 24 hours    Nausea    Risks of endometrial ablation:  You may not be able to get pregnant after endometrial ablation  You may bleed more than expected or get an infection in your vagina, urinary tract, or uterus  Your cervix, uterus, or nearby organs may be burned or damaged  You may get a blood clot in your leg or arm  A blockage may form over months to years and cause blood to pool inside your uterus  This blockage may cause severe pain and you may need a hysterectomy   You may also need a hysterectomy if endometrial ablation does not work  Call 911 if: You feel lightheaded, short of breath, and have chest pain  You cough up blood  Seek care immediately if:   Your arm or leg feels warm, tender, and painful  It may look swollen and red  You feel dizzy, weak, and confused  You cannot stop vomiting  You have severe pain  You are not able to urinate  Contact your healthcare provider if:   You have a fever  You have vaginal bleeding and it is not time for your monthly period  The bleeding during your monthly period has not decreased  You have pain when you urinate or see blood in your urine  You have questions or concerns about your condition or care  Medicines:   Medicines  can help decrease pain, calm your stomach, and control vomiting  Take your medicine as directed  Contact your healthcare provider if you think your medicine is not helping or if you have side effects  Tell him or her if you are allergic to any medicine  Keep a list of the medicines, vitamins, and herbs you take  Include the amounts, and when and why you take them  Bring the list or the pill bottles to follow-up visits  Carry your medicine list with you in case of an emergency  Activity:  Ask when you can return to your usual activities  Do not have sex or use tampons or douches for 6 weeks after your procedure, or as directed  Birth control: You may still need to use birth control to prevent pregnancy  Pregnancy risks, such as a miscarriage and tubal pregnancy, are higher after this procedure  Talk to your healthcare provider about birth control or pregnancy after endometrial ablation  Follow up with your doctor as directed:  Write down your questions so you remember to ask them during your visits  © Copyright [employer] 2022 Information is for End User's use only and may not be sold, redistributed or otherwise used for commercial purposes   All illustrations and images included in CareNotes® are the copyrighted property of A D A M , Inc  or [facility] Sharon Flores   The above information is an  only  It is not intended as medical advice for individual conditions or treatments  Talk to your doctor, nurse or pharmacist before following any medical regimen to see if it is safe and effective for you

## 2023-02-09 ENCOUNTER — AMB VIDEO VISIT (OUTPATIENT)
Dept: OTHER | Facility: HOSPITAL | Age: 38
End: 2023-02-09

## 2023-02-12 NOTE — CARE ANYWHERE EVISITS
Visit Summary for JOHN CLAY - Gender: Female - Date of Birth: [id number]  Date: [id number] - Duration: 4 minutes  Patient: Socrates CLAY  Provider: Katina Haley    Patient Contact Information  Address  [address] [place] [address]  [phone]    Visit Topics  Sinuses, face congestion  [Added By: Self - 2023-02-09]    Triage Questions   What is your current physical address in the event of a medical emergency? Answer []  Are you allergic to any medications? Answer []  Are you now or could you be pregnant? Answer []  Do you have any immune system compromise or chronic lung   disease? Answer []  Do you have any vulnerable family members in the home (infant, pregnant, cancer, elderly)? Answer []     Conversation Transcripts  [0A][0A] [Notification] You are connected with Katina Haley, Family Physician [0A][Notification] Ivan Bentley is located in [place]  [0A][Notification] Ivan Bentley has shared health history  Mojgan Higgins  [0A]    Diagnosis    Procedures  Value: [id number] Code: CPT-4 UNLISTED E&M SERVICE    Medications Prescribed    amoxicillin-pot clavulanate  Dose : 1 tablet  Route : oral  Frequency : every 12 hours  Refills : 0  Instructions to the Pharmacist : Dispense as written, brand medically necessary      Provider Notes  [0A][0A] We strongly encourage you to share the following record of[0A]today's visit with your primary care physician [0A]Mode of Communication: Yeimy Hem [0A]HPI: The patient has been having sinus pain and congestion  for more than a week  No fever, no   swollen glands, or sore throat  There[0A]is a slight cough but no shortness of breath  [0A]Allergies:denies any[0A]PE:[0A]Gen: Patient is well-appearing and in no acute distress[0A]Eyes: Normal appearing[0A]Nose: Congested[0A]Sinuses: Sinus tenderness   bilaterally[0A]Resp: No respiratory distress[0A]Assessment: Sinusitis Diagnosis Code: acute sinusitis NOS[0A]J01 90[0A]Plan:[0A]1  Medication as described below   Augmentin 873 milligrams[0A]one tablet twice daily for 10 days[0A]2  Discussed options and   precautions[0A]3  Recommend nasal saline, Neti Pot, plain Mucinex[0A]4  Sleeping with the head/chest elevated can help with sinus[0A]drainage[0A]Antibiotic indication: History[0A]Antibiotic choice: standard[0A] [0A]Follow up:[0A]1  Follow up in 5-7 days   if not improving  Discussed[0A]precautions  [0A]2  If there are any questions or problems with the prescription,[0A]call [phone] anytime for assistance  [0A]3  Please re-connect for another online visit or see an[0A]in-person provider should your   symptoms worsen or not improving 5-7days  [0A]    Electronically signed by: Eva Lima(NPI [id number])

## 2023-02-13 ENCOUNTER — TELEPHONE (OUTPATIENT)
Dept: GYNECOLOGY | Facility: CLINIC | Age: 38
End: 2023-02-13

## 2023-02-14 ENCOUNTER — PREP FOR PROCEDURE (OUTPATIENT)
Dept: GYNECOLOGY | Facility: CLINIC | Age: 38
End: 2023-02-14

## 2023-02-14 DIAGNOSIS — N84.0 UTERINE POLYP: ICD-10-CM

## 2023-02-14 DIAGNOSIS — N93.9 ABNORMAL UTERINE BLEEDING: Primary | ICD-10-CM

## 2023-03-03 ENCOUNTER — CLINICAL SUPPORT (OUTPATIENT)
Dept: URGENT CARE | Facility: MEDICAL CENTER | Age: 38
End: 2023-03-03

## 2023-03-03 ENCOUNTER — APPOINTMENT (OUTPATIENT)
Dept: LAB | Facility: MEDICAL CENTER | Age: 38
End: 2023-03-03

## 2023-03-03 DIAGNOSIS — N93.9 ABNORMAL UTERINE BLEEDING: ICD-10-CM

## 2023-03-03 DIAGNOSIS — Z01.818 PRE-OP TESTING: ICD-10-CM

## 2023-03-03 DIAGNOSIS — N84.0 UTERINE POLYP: ICD-10-CM

## 2023-03-03 LAB
ANION GAP SERPL CALCULATED.3IONS-SCNC: 2 MMOL/L (ref 4–13)
BASOPHILS # BLD AUTO: 0.04 THOUSANDS/ÂΜL (ref 0–0.1)
BASOPHILS NFR BLD AUTO: 1 % (ref 0–1)
BUN SERPL-MCNC: 15 MG/DL (ref 5–25)
CALCIUM SERPL-MCNC: 9 MG/DL (ref 8.3–10.1)
CHLORIDE SERPL-SCNC: 106 MMOL/L (ref 96–108)
CO2 SERPL-SCNC: 30 MMOL/L (ref 21–32)
CREAT SERPL-MCNC: 0.82 MG/DL (ref 0.6–1.3)
EOSINOPHIL # BLD AUTO: 0.13 THOUSAND/ÂΜL (ref 0–0.61)
EOSINOPHIL NFR BLD AUTO: 3 % (ref 0–6)
ERYTHROCYTE [DISTWIDTH] IN BLOOD BY AUTOMATED COUNT: 11.9 % (ref 11.6–15.1)
GFR SERPL CREATININE-BSD FRML MDRD: 90 ML/MIN/1.73SQ M
GLUCOSE P FAST SERPL-MCNC: 94 MG/DL (ref 65–99)
HCT VFR BLD AUTO: 42.1 % (ref 34.8–46.1)
HGB BLD-MCNC: 13.5 G/DL (ref 11.5–15.4)
IMM GRANULOCYTES # BLD AUTO: 0.02 THOUSAND/UL (ref 0–0.2)
IMM GRANULOCYTES NFR BLD AUTO: 0 % (ref 0–2)
LYMPHOCYTES # BLD AUTO: 1.29 THOUSANDS/ÂΜL (ref 0.6–4.47)
LYMPHOCYTES NFR BLD AUTO: 25 % (ref 14–44)
MCH RBC QN AUTO: 29.7 PG (ref 26.8–34.3)
MCHC RBC AUTO-ENTMCNC: 32.1 G/DL (ref 31.4–37.4)
MCV RBC AUTO: 93 FL (ref 82–98)
MONOCYTES # BLD AUTO: 0.34 THOUSAND/ÂΜL (ref 0.17–1.22)
MONOCYTES NFR BLD AUTO: 7 % (ref 4–12)
NEUTROPHILS # BLD AUTO: 3.32 THOUSANDS/ÂΜL (ref 1.85–7.62)
NEUTS SEG NFR BLD AUTO: 64 % (ref 43–75)
NRBC BLD AUTO-RTO: 0 /100 WBCS
PLATELET # BLD AUTO: 305 THOUSANDS/UL (ref 149–390)
PMV BLD AUTO: 10.6 FL (ref 8.9–12.7)
POTASSIUM SERPL-SCNC: 3.9 MMOL/L (ref 3.5–5.3)
RBC # BLD AUTO: 4.54 MILLION/UL (ref 3.81–5.12)
SODIUM SERPL-SCNC: 138 MMOL/L (ref 135–147)
WBC # BLD AUTO: 5.14 THOUSAND/UL (ref 4.31–10.16)

## 2023-03-03 NOTE — PROGRESS NOTES
Thomas Nobles had walk-in EKG completed on 03/03/23 at 8:25 AM by Alyssa Vergara RN  Speaking Coherently

## 2023-03-05 LAB
ATRIAL RATE: 58 BPM
P AXIS: 71 DEGREES
PR INTERVAL: 146 MS
QRS AXIS: 52 DEGREES
QRSD INTERVAL: 82 MS
QT INTERVAL: 426 MS
QTC INTERVAL: 418 MS
T WAVE AXIS: 34 DEGREES
VENTRICULAR RATE: 58 BPM

## 2023-03-07 ENCOUNTER — TELEPHONE (OUTPATIENT)
Dept: CARDIOLOGY CLINIC | Facility: CLINIC | Age: 38
End: 2023-03-07

## 2023-03-07 NOTE — TELEPHONE ENCOUNTER
Called left vm for pt to cb  Questioning if pt still needs this appt, as her surgery is on 3/14 and our appt is for this surgery to have a CC on 3/27  Need to know if to keep or cancel appt?     Pt to call back

## 2023-03-09 ENCOUNTER — TELEPHONE (OUTPATIENT)
Dept: GYNECOLOGY | Facility: CLINIC | Age: 38
End: 2023-03-09

## 2023-03-09 NOTE — TELEPHONE ENCOUNTER
I called and left pt a message that I was cancelling her surgery for 3/14/23 with Dr Megan Delong and that I will get her rescheduled after her Cardiac Clearance consult on 3/27/23 once she is cleared

## 2023-03-17 ENCOUNTER — OFFICE VISIT (OUTPATIENT)
Dept: URGENT CARE | Facility: CLINIC | Age: 38
End: 2023-03-17

## 2023-03-17 VITALS
HEIGHT: 62 IN | TEMPERATURE: 98.4 F | BODY MASS INDEX: 23 KG/M2 | HEART RATE: 58 BPM | DIASTOLIC BLOOD PRESSURE: 73 MMHG | RESPIRATION RATE: 18 BRPM | WEIGHT: 125 LBS | OXYGEN SATURATION: 100 % | SYSTOLIC BLOOD PRESSURE: 129 MMHG

## 2023-03-17 DIAGNOSIS — R07.89 FEELING OF CHEST TIGHTNESS: ICD-10-CM

## 2023-03-17 DIAGNOSIS — J01.00 ACUTE MAXILLARY SINUSITIS, RECURRENCE NOT SPECIFIED: Primary | ICD-10-CM

## 2023-03-17 RX ORDER — DOXYCYCLINE 100 MG/1
100 TABLET ORAL 2 TIMES DAILY
Qty: 14 TABLET | Refills: 0 | Status: SHIPPED | OUTPATIENT
Start: 2023-03-17 | End: 2023-03-24

## 2023-03-17 NOTE — PROGRESS NOTES
[facility]        NAME: Skylar Thomas is a 43 y o  female  : 1985    MRN: [id number]  DATE: 2023  TIME: 8:42 AM    Assessment and Plan   Acute maxillary sinusitis, recurrence not specified [J01 00]  1  Acute maxillary sinusitis, recurrence not specified  doxycycline (ADOXA) 100 MG tablet      2  Feeling of chest tightness        Patient presents with symptoms consistent with bacterial maxillary sinusitis  To be started doxycycline to treat  She is instructed to use her rescue inhaler as needed for feelings of chest tightness  Patient Instructions   Patient Instructions   Take antibiotics as directed  Use rescue inhaler as needed for chest tightness symptoms to avoid asthma attack  Follow-up with PCP in 2 to 3 days if symptoms or not improved on antibiotics  If symptoms worsen report to the emergency room immediately  Chief Complaint     Chief Complaint   Patient presents with   • Cold Like Symptoms     Last week sinus issues and using otc meds , throat felt dry and raw but not having issues with raspy voice/throat  Feeling like her chest is feeling more tight, possibly related to her asthma, using rescue inhaler which may or may not help  History of Present Illness       43year old female presents with complaint of sinus congestion, postnasal drip, chest tightness, and laryngitis  Pt reports that symptoms began with sinuses over a week ago and has been having progressive chest tightness this week  She has a history of asthma, but has not used her rescue inhaler as she doesn't feel like she is having an asthma attack  She also has history of allergies and has been using sinus rinses that help, but she has a lot of sinus pain and pressure especially in the morning  She is doing sinus rinses and taking allergy medications with only mild relief  Review of Systems   Review of Systems   Constitutional: Positive for fatigue  Negative for chills and fever     HENT: Positive for congestion, postnasal drip, rhinorrhea, sinus pressure, sinus pain and voice change  Negative for sore throat and trouble swallowing  Respiratory: Positive for chest tightness  Negative for cough and shortness of breath  Cardiovascular: Negative for chest pain  Gastrointestinal: Negative for diarrhea, nausea and vomiting  Musculoskeletal: Negative for myalgias  Neurological: Positive for headaches  Current Medications       Current Outpatient Medications:   •  albuterol (PROVENTIL HFA,VENTOLIN HFA) 90 mcg/act inhaler, Inhale 2 puffs every 6 (six) hours as needed for wheezing, Disp: , Rfl:   •  cetirizine (ZyrTEC) 10 MG chewable tablet, Chew 10 mg daily, Disp: , Rfl:   •  doxycycline (ADOXA) 100 MG tablet, Take 1 tablet (100 mg total) by mouth 2 (two) times a day for 7 days, Disp: 14 tablet, Rfl: 0    Current Allergies     Allergies as of 2023 - Reviewed 2023   Allergen Reaction Noted   • Sulfa antibiotics  03/10/2016            The following portions of the patient's history were reviewed and updated as appropriate: allergies, current medications, past family history, past medical history, past social history, past surgical history and problem list      Past Medical History:   Diagnosis Date   • Anxiety    • Asthma    • Chronic kidney disease     stones   • Miscarriage 2016   • Varicella     childhood       Past Surgical History:   Procedure Laterality Date   • AR TX MISSED  FIRST TRIMESTER SURGICAL N/A 2016    Procedure: DILATATION AND EVACUATION (D&E);   Surgeon: Rosalva Negrete MD;  Location:  MAIN OR;  Service: Gynecology   • RECONSTRUCTIVE REPAIR STERNAL  2005    leg   • SINUS SURGERY         Family History   Problem Relation Age of Onset   • Hyperlipidemia Mother    • Hyperlipidemia Father    • Hypertension Father    • Heart disease Father    • Skin cancer Father    • Breast cancer Maternal Grandmother         45s   • Cancer Maternal Grandmother    • Vision loss Maternal Grandfather    • Heart disease Paternal Grandfather    • Endometrial cancer Maternal Uncle    • Cancer Maternal Uncle    • Cancer Paternal Uncle    • Diabetes Paternal Uncle    • Throat cancer Paternal Uncle    • Lung cancer Paternal Uncle    • Diabetes Paternal Uncle    • BRCA1 Positive Maternal Aunt    • BRCA1 Positive Maternal Aunt    • No Known Problems Maternal Aunt    • No Known Problems Maternal Aunt          Medications have been verified  Objective   /73   Pulse 58   Temp 98 4 °F (36 9 °C)   Resp 18   Ht 5' 2" (1 575 m)   Wt 56 7 kg (125 lb)   SpO2 100%   BMI 22 86 kg/m²   No LMP recorded  Physical Exam     Physical Exam  Vitals and nursing note reviewed  Constitutional:       General: She is not in acute distress  Appearance: Normal appearance  She is not ill-appearing or toxic-appearing  HENT:      Head: Normocephalic and atraumatic  Jaw: No trismus  Right Ear: Tympanic membrane, ear canal and external ear normal  There is impacted cerumen  No foreign body  Left Ear: Tympanic membrane, ear canal and external ear normal  There is no impacted cerumen  No foreign body  Nose: Mucosal edema, congestion and rhinorrhea present  No nasal deformity  Rhinorrhea is purulent  Right Nostril: No foreign body, epistaxis or occlusion  Left Nostril: No foreign body, epistaxis or occlusion  Right Turbinates: Not enlarged, swollen or pale  Left Turbinates: Not enlarged, swollen or pale  Right Sinus: Maxillary sinus tenderness present  No frontal sinus tenderness  Left Sinus: Maxillary sinus tenderness present  No frontal sinus tenderness  Mouth/Throat:      Lips: Pink  No lesions  Mouth: Mucous membranes are moist  No injury, oral lesions or angioedema  Dentition: Normal dentition  Tongue: No lesions  Tongue does not deviate from midline  Palate: No mass and lesions  Pharynx: Uvula midline  No pharyngeal swelling, oropharyngeal exudate, posterior oropharyngeal erythema or uvula swelling  Tonsils: No tonsillar exudate or tonsillar abscesses  Eyes:      General: Lids are normal  Gaze aligned appropriately  No allergic shiner  Extraocular Movements: Extraocular movements intact  Cardiovascular:      Rate and Rhythm: Normal rate and regular rhythm  Heart sounds: Normal heart sounds, S1 normal and S2 normal  Heart sounds not distant  No murmur heard  No friction rub  No gallop  Pulmonary:      Effort: Pulmonary effort is normal       Breath sounds: Normal breath sounds  No decreased breath sounds, wheezing, rhonchi or rales  Comments: Patient speaking in full sentences with no increased respiratory effort  No audible wheezing or stridor  Lymphadenopathy:      Cervical: Cervical adenopathy present  Right cervical: Superficial cervical adenopathy present  No deep or posterior cervical adenopathy  Left cervical: Superficial cervical adenopathy present  No deep or posterior cervical adenopathy  Skin:     General: Skin is warm and dry  Neurological:      Mental Status: She is alert and oriented to person, place, and time  Coordination: Coordination is intact  Gait: Gait is intact  Psychiatric:         Attention and Perception: Attention and perception normal          Mood and Affect: Mood and affect normal          Speech: Speech normal          Behavior: Behavior is cooperative  Note: Portions of this record may have been created with voice recognition software  Occasional wrong word or "sound a like" substitutions may have occurred due to the inherent limitations of voice recognition software  Please read the chart carefully and recognize, using context, where substitutions have occurred  *

## 2023-03-17 NOTE — PATIENT INSTRUCTIONS
Take antibiotics as directed  Use rescue inhaler as needed for chest tightness symptoms to avoid asthma attack  Follow-up with PCP in 2 to 3 days if symptoms or not improved on antibiotics  If symptoms worsen report to the emergency room immediately

## 2023-03-17 NOTE — LETTER
March 17, 2023     Patient: Ailyn Vasquez   YOB: 1985   Date of Visit: 3/17/2023       To Whom It May Concern: It is my medical opinion that Ailyn Vasquez may return to work on 03/18/2023       If you have any questions or concerns, please don't hesitate to call           Sincerely,        Marlena Ventura PA-C    CC: No Recipients

## 2023-03-27 ENCOUNTER — CONSULT (OUTPATIENT)
Dept: CARDIOLOGY CLINIC | Facility: CLINIC | Age: 38
End: 2023-03-27

## 2023-03-27 VITALS
HEART RATE: 65 BPM | WEIGHT: 126 LBS | BODY MASS INDEX: 23.19 KG/M2 | DIASTOLIC BLOOD PRESSURE: 60 MMHG | SYSTOLIC BLOOD PRESSURE: 128 MMHG | HEIGHT: 62 IN

## 2023-03-27 DIAGNOSIS — R94.31 ABNORMAL EKG: Primary | ICD-10-CM

## 2023-03-27 DIAGNOSIS — Z01.810 PREOP CARDIOVASCULAR EXAM: ICD-10-CM

## 2023-03-27 DIAGNOSIS — R01.1 MURMUR, CARDIAC: ICD-10-CM

## 2023-03-27 NOTE — LETTER
Cardiology Pre Operative Clearance      PRE OPERATIVE CARDIAC RISK ASSESSMENT    03/27/23    Destiney Theodore  1985  [id number]    Procedures:    Berkey Katayama AND CURETTAGE (D&C) WITH HYSTEROSCOPY (Uterus) (canceled)       ABLATION ENDOMETRIAL DALIA (Uterus) (canceled)   Anesthesia type: General   Diagnosis:        Abnormal uterine bleeding [N93 9]       Uterine polyp [N84 0]   Pre-op diagnosis:        Abnormal uterine bleeding [N93 9]       Uterine polyp [N84 0]   Location: [address] [place] [address]   Surgeons: Britany Combs MD         No Cardiac Contraindication for Planned Surgical Procedures    Anticoagulation: not applicable    Physician Comment: Low risk       Electronically Signed: Amanda Dunn MD

## 2023-03-27 NOTE — PROGRESS NOTES
"[facility]  Office Consultation  Ortiz Narvaez 43 y o  female MRN: [id number]        Chief Complaint    Chief Complaint   Patient presents with   • Consult     Referred by OB   • Pre-op Clearance     D &C with Hysterectomy    • Chest Pain     Sharp then dull ache, random    • Rapid Heart Rate     Occurs at night on occasion    • Dizziness     Occurs with positional changes       Referring Provider: No ref  provider found    Impression & Plan:    1  Abnormal EKG  Her EKG is normal      2  Preop cardiovascular exam  Very low risk for cardiac complications of planned procedure  Her Landon Ma perioperative risk score, for MI or SCD up to 30 days postop, is 0 0%  She has excellent exertional capacity without cardiac symptoms  Her EKG is unremarkable  Her cardiac physical exam is notable for an incidental murmur, which we will investigate  Given her lack of cardiac symptoms, the work-up for this incidental finding should not delay scheduling for her surgery, as it is extraordinarily unlikely to have any bearing on her perioperative risk  3  Murmur, cardiac  We will check an echo  - Echo complete w/ contrast if indicated; Future        We will see Ortiz Narvaez back PRN    HPI: Ortiz Narvaez is a 43y o  year old female presenting for preoperative risk evaluation for endometrial ablation  She presented to routine preoperative testing and was found to have a slightly abnormal EKG with \"possible left atrial enlargement\" and was sent to cardiology for clearance  In my interpretation of this EKG, there is low concern for left atrial enlargement  Her preoperative BMP and CBC were totally normal     She is very physically active  She works out 5 days of each week  She is very physically fit  No cardiac symptoms whatsoever  She has a 11year-old son who is being followed for a murmur detected at birth    She is unsure of the exact diagnosis but he gets ultrasounds done every 2 years for " monitoring  Cardiac testing:         EKG reviewed personally:   EKG in the office 3/27/2023 shows normal sinus rhythm, mild sinus arrhythmia, 73 bpm, normal axis, normal intervals  Normal study  EKG in preop 3/3/2023, personally reviewed and independently reinterpreted, sinus bradycardia, 58 bpm, normal axis, normal intervals  My interpretation, no left atrial enlargement  Normal study  Relevant Laboratory Studies:  (Laboratory studies personally reviewed)  BMP 3/3/2023 reviewed--within normal limits  CBC 3/3/2023 reviewed--within normal limits    Review of Systems   Constitutional: Negative for activity change and fatigue  HENT: Negative for facial swelling  Eyes: Negative for visual disturbance  Respiratory: Negative for chest tightness and shortness of breath  Cardiovascular: Negative for chest pain, palpitations and leg swelling  Gastrointestinal: Negative for nausea and vomiting  Musculoskeletal: Negative for myalgias  Skin: Negative for pallor  Allergic/Immunologic: Negative for immunocompromised state  Neurological: Negative for dizziness, syncope and light-headedness  Psychiatric/Behavioral: Negative for agitation and confusion  The patient is not nervous/anxious  Past Medical History:   Diagnosis Date   • Anxiety    • Asthma    • Chronic kidney disease     stones   • Miscarriage    • Varicella     childhood     Past Surgical History:   Procedure Laterality Date   • SD TX MISSED  FIRST TRIMESTER SURGICAL N/A 2016    Procedure: DILATATION AND EVACUATION (D&E);   Surgeon: Zandra Westfall MD;  Location: [facility];  Service: Gynecology   • RECONSTRUCTIVE REPAIR STERNAL  2005    leg   • SINUS SURGERY       Social History     Substance and Sexual Activity   Alcohol Use Yes   • Alcohol/week: 3 0 standard drinks   • Types: 3 Glasses of wine per week    Comment: occasional     Social History     Substance and Sexual Activity   Drug Use No     Social History     Tobacco Use   Smoking Status Never   Smokeless Tobacco Never     Family History   Problem Relation Age of Onset   • Hyperlipidemia Mother    • Hyperlipidemia Father    • Hypertension Father    • Heart disease Father    • Skin cancer Father    • Breast cancer Maternal Grandmother         45s   • Cancer Maternal Grandmother    • Vision loss Maternal Grandfather    • Heart disease Paternal Grandfather    • Endometrial cancer Maternal Uncle    • Cancer Maternal Uncle    • Cancer Paternal Uncle    • Diabetes Paternal Uncle    • Throat cancer Paternal Uncle    • Lung cancer Paternal Uncle    • Diabetes Paternal Uncle    • BRCA1 Positive Maternal Aunt    • BRCA1 Positive Maternal Aunt    • No Known Problems Maternal Aunt    • No Known Problems Maternal Aunt        Allergies: Allergies   Allergen Reactions   • Sulfa Antibiotics      Unknown, childhood alllergy       Medications (as of START of this encounter): Outpatient Medications Prior to Visit   Medication Sig Dispense Refill   • albuterol (PROVENTIL HFA,VENTOLIN HFA) 90 mcg/act inhaler Inhale 2 puffs every 6 (six) hours as needed for wheezing     • cetirizine (ZyrTEC) 10 MG chewable tablet Chew 10 mg daily       No facility-administered medications prior to visit  Vitals:    03/27/23 1321   BP: 128/60   Pulse: 65     Weight (last 2 days)     Date/Time Weight    03/27/23 1321 57 2 (126)          General: Dorothy Segal is a well appearing female, in no acute distress, sitting comfortably  HEENT: moist mucous membranes, EOMI  Neck:  No JVD, supple, trachea midline   Cardiovascular: unremarkable S1/S2, regular rate and rhythm, 3/6 SM LLSB  Pulmonary: normal respiratory effort, CTAB   Abdomen: soft and nondistended  Extremities: No lower extremity edema  Warm and well perfused extremities     Neuro: no focal motor deficits, AAOx3 (person, place, time)  Psych: Normal mood and affect, cooperative        Time Spent:  Total time (face-to-face and "non-face-to-face) spent on today's visit was 40 minutes  This includes preparation for the visits (i e  reviewing test results), performance of a medically appropriate history and examination, and orders for medications, tests or other procedures  This time is exclusive of procedures performed and time spent teaching  Adan Louis MD    This note was completed in part utilizing [employer] [employer] recognition software  Grammatical errors, random word insertion, spelling mistakes, occasional wrong word or \"sound-alike\" substitutions and incomplete sentences may be an occasional consequence of the system secondary to software limitations, ambient noise and hardware issues  At the time of dictation, efforts were made to edit, clarify and /or correct errors  Please read the chart carefully and recognize, using context, where substitutions have occurred  If you have any questions or concerns about the context, text or information contained within the body of this dictation, please contact myself, the provider, for further clarification      "

## 2023-03-28 ENCOUNTER — TELEPHONE (OUTPATIENT)
Dept: GYNECOLOGY | Facility: CLINIC | Age: 38
End: 2023-03-28

## 2023-03-28 NOTE — TELEPHONE ENCOUNTER
I left message for pt to call me back about getting her surgery scheduled with Dr Derick Mahan again

## 2023-03-29 ENCOUNTER — TELEPHONE (OUTPATIENT)
Dept: GYNECOLOGY | Facility: CLINIC | Age: 38
End: 2023-03-29

## 2023-03-29 ENCOUNTER — PREP FOR PROCEDURE (OUTPATIENT)
Dept: GYNECOLOGY | Facility: CLINIC | Age: 38
End: 2023-03-29

## 2023-03-29 ENCOUNTER — HOSPITAL ENCOUNTER (OUTPATIENT)
Dept: NON INVASIVE DIAGNOSTICS | Facility: MEDICAL CENTER | Age: 38
Discharge: HOME | End: 2023-03-29

## 2023-03-29 VITALS
SYSTOLIC BLOOD PRESSURE: 128 MMHG | DIASTOLIC BLOOD PRESSURE: 60 MMHG | BODY MASS INDEX: 23.19 KG/M2 | HEART RATE: 65 BPM | WEIGHT: 126 LBS | HEIGHT: 62 IN

## 2023-03-29 DIAGNOSIS — N84.0 ENDOMETRIAL POLYP: ICD-10-CM

## 2023-03-29 DIAGNOSIS — N93.9 ABNORMAL UTERINE BLEEDING: Primary | ICD-10-CM

## 2023-03-29 DIAGNOSIS — R01.1 MURMUR, CARDIAC: ICD-10-CM

## 2023-03-29 LAB
AORTIC ROOT: 2.8 CM
APICAL FOUR CHAMBER EJECTION FRACTION: 56 %
ASCENDING AORTA: 2.2 CM
E WAVE DECELERATION TIME: 233 MS
FRACTIONAL SHORTENING: 30 (ref 28–44)
INTERVENTRICULAR SEPTUM IN DIASTOLE (PARASTERNAL SHORT AXIS VIEW): 0.6 CM
INTERVENTRICULAR SEPTUM: 0.6 CM (ref 0.6–1.1)
LAAS-AP2: 11.9 CM2
LAAS-AP4: 15.9 CM2
LEFT ATRIUM SIZE: 2.7 CM
LEFT INTERNAL DIMENSION IN SYSTOLE: 3.3 CM (ref 2.1–4)
LEFT VENTRICULAR INTERNAL DIMENSION IN DIASTOLE: 4.7 CM (ref 3.5–6)
LEFT VENTRICULAR POSTERIOR WALL IN END DIASTOLE: 0.6 CM
LEFT VENTRICULAR STROKE VOLUME: 58 ML
LVSV (TEICH): 58 ML
MV E'TISSUE VEL-SEP: 13 CM/S
MV PEAK A VEL: 0.51 M/S
MV PEAK E VEL: 76 CM/S
MV STENOSIS PRESSURE HALF TIME: 68 MS
MV VALVE AREA P 1/2 METHOD: 3.24
RIGHT ATRIUM AREA SYSTOLE A4C: 11.7 CM2
RIGHT VENTRICLE ID DIMENSION: 3 CM
SL CV LEFT ATRIUM LENGTH A2C: 4.4 CM
SL CV LV EF: 60
SL CV PED ECHO LEFT VENTRICLE DIASTOLIC VOLUME (MOD BIPLANE) 2D: 103 ML
SL CV PED ECHO LEFT VENTRICLE SYSTOLIC VOLUME (MOD BIPLANE) 2D: 45 ML
TR MAX PG: 31 MMHG
TR PEAK VELOCITY: 2.8 M/S
TRICUSPID ANNULAR PLANE SYSTOLIC EXCURSION: 1.9 CM
TRICUSPID VALVE PEAK REGURGITATION VELOCITY: 2.77 M/S

## 2023-05-02 ENCOUNTER — OFFICE VISIT (OUTPATIENT)
Dept: OBGYN CLINIC | Facility: CLINIC | Age: 38
End: 2023-05-02

## 2023-05-02 VITALS — BODY MASS INDEX: 22.5 KG/M2 | DIASTOLIC BLOOD PRESSURE: 70 MMHG | SYSTOLIC BLOOD PRESSURE: 120 MMHG | WEIGHT: 123 LBS

## 2023-05-02 DIAGNOSIS — Z98.890 S/P ENDOMETRIAL ABLATION: Primary | ICD-10-CM

## 2023-05-02 NOTE — PROGRESS NOTES
[facility]  Post-Op Visit  Ras Heller MD  05/02/23      Assessment   Patient is a 43 y o  y o  female who is 3 weeks s/p D&C hsyteroscopy and endometrial ablation who is doing well post-operatively  Operative findings again reviewed  Pathology report discussed  Plan    1  Pain management PRN: Motrin/ Tyl PRN  2  Activity restrictions: Increase activity as tolerated  3  Follow up in November for annual exam and pap or sooner if any concerns    Subjective    Anup Felipe is a 43 y o  y o  female who presents to the office 3 weeks status post operative hysteroscopy and endometrial ablation for abnormal uterine bleeding  She is eating a regular diet with difficulty  Urination and bowel movements are normal  She reports some occasional increased cramping with increased activity  Occasional pink in discharge- no bright red bleeding  Denies any malodorous discharge  She has been taking nothing for pain control  Sulfa antibiotics    Current Outpatient Medications:     albuterol (PROVENTIL HFA,VENTOLIN HFA) 90 mcg/act inhaler, Inhale 2 puffs every 6 (six) hours as needed for wheezing, Disp: , Rfl:     cetirizine (ZyrTEC) 10 MG chewable tablet, Chew 10 mg daily, Disp: , Rfl:       Review of Systems  Denies Fevers/chills/N/V/Constipation/Vaginal bleeding/excessive pain/dysuria/frequency of urination  Also as noted in HPI      PMH/ PSH/ SH/ PFH/ allergies and medications were reviewed and updated during this visit  Objective   /70 (BP Location: Left arm, Patient Position: Sitting, Cuff Size: Standard)   Wt 55 8 kg (123 lb)   LMP  (LMP Unknown)   BMI 22 50 kg/m²   Physical Exam  Vitals reviewed  Constitutional:       General: She is not in acute distress  Appearance: She is not ill-appearing or diaphoretic  Cardiovascular:      Rate and Rhythm: Normal rate  Pulmonary:      Effort: Pulmonary effort is normal  No respiratory distress     Abdominal:      Palpations: Abdomen is soft  Tenderness: There is no abdominal tenderness  There is no guarding or rebound  Genitourinary:     General: Normal vulva  Comments: On speculum exam th e vagina and cervix appeared grossly normal, no lesions  Cervix was closed- minimal mucoid discharge on cervix with sorin pink noted  On bimanual exam no CMT or uterine tenderness  Uterus anteverted and mobile with no adnexal masses or tenderness appreciated  Musculoskeletal:      Right lower leg: No edema  Left lower leg: No edema  Skin:     General: Skin is warm and dry  Neurological:      Mental Status: She is alert and oriented to person, place, and time     Psychiatric:         Mood and Affect: Mood normal          Behavior: Behavior normal

## 2023-05-09 ENCOUNTER — OFFICE VISIT (OUTPATIENT)
Dept: URGENT CARE | Facility: CLINIC | Age: 38
End: 2023-05-09

## 2023-05-09 VITALS
SYSTOLIC BLOOD PRESSURE: 144 MMHG | DIASTOLIC BLOOD PRESSURE: 78 MMHG | RESPIRATION RATE: 20 BRPM | OXYGEN SATURATION: 100 % | WEIGHT: 125 LBS | BODY MASS INDEX: 22.86 KG/M2 | TEMPERATURE: 98.9 F | HEART RATE: 74 BPM

## 2023-05-09 DIAGNOSIS — J01.90 ACUTE BACTERIAL SINUSITIS: Primary | ICD-10-CM

## 2023-05-09 DIAGNOSIS — B96.89 ACUTE BACTERIAL SINUSITIS: Primary | ICD-10-CM

## 2023-05-09 RX ORDER — AMOXICILLIN AND CLAVULANATE POTASSIUM 875; 125 MG/1; MG/1
1 TABLET, FILM COATED ORAL EVERY 12 HOURS SCHEDULED
Qty: 14 TABLET | Refills: 0 | Status: SHIPPED | OUTPATIENT
Start: 2023-05-09 | End: 2023-05-16

## 2023-05-09 NOTE — PROGRESS NOTES
[facility]        NAME: Beni Strange is a 43 y o  female  : 1985    MRN: [id number]  DATE: May 9, 2023  TIME: 5:23 PM    Assessment and Plan   Acute bacterial sinusitis [J01 90, B96 89]  1  Acute bacterial sinusitis  amoxicillin-clavulanate (AUGMENTIN) 875-125 mg per tablet        Patient Instructions   Sinusitis  rx augmentin twice daily x 7 days sent via EMR  Recommend flonase nasal spray and sudafed for postnasal drip/cough  Warm salt water gargles  Rest, fluids and supportive care  May benefit from a cool mist humidifier on night stand  Tylenol/ibuprofen as needed for pain/fever    Follow up with PCP in 3-5 days  Proceed to  ER if symptoms worsen  Chief Complaint     Chief Complaint   Patient presents with   • Sinus Problem     C/O sinus pressure  and PND, with use of mucinex sinus,and flonase         History of Present Illness       Stephan Mayfield is a 69-year-old female who presents to clinic complaining of bilateral sinus pain and pressure x4 days  She states she had an upper respiratory infection last week however the last 4 days she is having severe bilateral maxillary sinus pain and pressure  She also notes sore throat, postnasal drip, ear pressure, cough, and myalgias  She states the cough is worse when she lays down and dry  She denies any fever, chills, ear pain, shortness of breath, nausea, vomiting, diarrhea, loss of taste or smell, recent travel, or exposure to anyone known COVID-positive  Review of Systems   Review of Systems   Constitutional: Negative for chills, fatigue and fever  HENT: Positive for postnasal drip, sinus pressure, sinus pain and sore throat  Negative for congestion, ear pain and rhinorrhea  Respiratory: Positive for cough  Negative for shortness of breath  Gastrointestinal: Negative for diarrhea, nausea and vomiting  Musculoskeletal: Positive for myalgias  Neurological: Positive for headaches  Negative for dizziness and light-headedness  Current Medications       Current Outpatient Medications:   •  amoxicillin-clavulanate (AUGMENTIN) 875-125 mg per tablet, Take 1 tablet by mouth every 12 (twelve) hours for 7 days, Disp: 14 tablet, Rfl: 0  •  albuterol (PROVENTIL HFA,VENTOLIN HFA) 90 mcg/act inhaler, Inhale 2 puffs every 6 (six) hours as needed for wheezing, Disp: , Rfl:   •  cetirizine (ZyrTEC) 10 MG chewable tablet, Chew 10 mg daily, Disp: , Rfl:     Current Allergies     Allergies as of 2023 - Reviewed 2023   Allergen Reaction Noted   • Sulfa antibiotics  03/10/2016            The following portions of the patient's history were reviewed and updated as appropriate: allergies, current medications, past family history, past medical history, past social history, past surgical history and problem list      Past Medical History:   Diagnosis Date   • Anxiety    • Asthma    • Chronic kidney disease     stones   • Miscarriage    • Varicella     childhood       Past Surgical History:   Procedure Laterality Date   • [place] HYSTEROSCOPY BX ENDOMETRIUM&/POLYPC W/WO D&C N/A 2023    Procedure: DILATATION AND CURETTAGE (D&C) WITH HYSTEROSCOPY;  Surgeon: Maciej Morgan MD;  Location: [facility];  Service: Gynecology   • [place] HYSTEROSCOPY ENDOMETRIAL ABLATION N/A 2023    Procedure: ABLATION ENDOMETRIAL DALIA;  Surgeon: Maciej Morgan MD;  Location: [facility];  Service: Gynecology   • [place] TX MISSED  FIRST TRIMESTER SURGICAL N/A 2016    Procedure: DILATATION AND EVACUATION (D&E);   Surgeon: Yonatan Espino MD;  Location: [facility];  Service: Gynecology   • RECONSTRUCTIVE REPAIR STERNAL  2005    leg   • SINUS SURGERY         Family History   Problem Relation Age of Onset   • Hyperlipidemia Mother    • Hyperlipidemia Father    • Hypertension Father    • Heart disease Father    • Skin cancer Father    • Breast cancer Maternal Grandmother         45s   • Cancer Maternal Grandmother    • Vision loss Maternal Grandfather    • Heart disease Paternal Grandfather    • Endometrial cancer Maternal Uncle    • Cancer Maternal Uncle    • Cancer Paternal Uncle    • Diabetes Paternal Uncle    • Throat cancer Paternal Uncle    • Lung cancer Paternal Uncle    • Diabetes Paternal Uncle    • BRCA1 Positive Maternal Aunt    • BRCA1 Positive Maternal Aunt    • No Known Problems Maternal Aunt    • No Known Problems Maternal Aunt          Medications have been verified  Objective   /78   Pulse 74   Temp 98 9 °F (37 2 °C)   Resp 20   Wt 56 7 kg (125 lb)   LMP  (Within Months) Comment: recent ablation and d/c 4/11  SpO2 100%   BMI 22 86 kg/m²   No LMP recorded (within months)  Physical Exam     Physical Exam  Vitals and nursing note reviewed  Constitutional:       General: She is not in acute distress  Appearance: Normal appearance  She is not ill-appearing  HENT:      Right Ear: Tympanic membrane, ear canal and external ear normal       Left Ear: Tympanic membrane, ear canal and external ear normal       Nose: Congestion present  Right Sinus: Maxillary sinus tenderness present  No frontal sinus tenderness  Left Sinus: Maxillary sinus tenderness present  No frontal sinus tenderness  Mouth/Throat:      Mouth: Mucous membranes are moist       Pharynx: No oropharyngeal exudate or posterior oropharyngeal erythema  Cardiovascular:      Rate and Rhythm: Normal rate and regular rhythm  Heart sounds: Normal heart sounds  Pulmonary:      Effort: Pulmonary effort is normal       Breath sounds: Normal breath sounds  Lymphadenopathy:      Cervical: Cervical adenopathy present  Neurological:      Mental Status: She is alert and oriented to person, place, and time     Psychiatric:         Mood and Affect: Mood normal          Behavior: Behavior normal

## 2023-07-20 NOTE — PROGRESS NOTES
This patient was seen on 7/21/23. My role is Foot , Ankle, and Wound Specialist    ASSESSMENT     Diagnoses and all orders for this visit:    Right foot pain         Problem List Items Addressed This Visit    None  Visit Diagnoses     Right foot pain    -  Primary            PLAN  -Patient was educated regarding their condition.  -We discussed surgical versus conservative options for patient's pathology. At this point the patient has been in worsening pain for about a year with the past 4 months being the worst.  She has attempted conservative therapy including NSAIDs, activity modification, ice, and changing her footwear. She states that she would like to pursue surgical intervention.  -We discussed what surgical intervention would entail including postoperative course, alternatives, possible complications.  -Recommend purchase of supportive shoes with wide toe box and rockerbottom sole. Recommend purchase of OTC carbon fiber inserts with Swenson's extension.  -Patient will RTC in 3-months for surgical planning  -X-ray from 7/21/2023 of the right foot was interpreted by myself as below:    -X-ray reviewed of right foot. AP, Lateral, and medial oblique views noted. No fractures noted. IM angle 11deg. Hallux abductus angle 20 degrees. Hallux interphalangeus angle within normal limits. Angles angle 22 degrees. No metatarsus primus elevatus noted. 1st MTPJ joint space within normal limits. no cystic changes at the first metatarsal head noted. This x-ray is consistent with a mild hallux valgus deformity. SUBJECTIVE    Chief Complaint:  Right foot pain     Patient ID: Dylan Yeh is a 43 y.o. female. Kandis Pope is a 79-year-old female who presents with pain to right foot, she states that it comes and goes. she states that the pain is worse with heels, better with flats. She states that the pain has been present for a long time but has gotten progressively worse over the last 4-months.  Does not recall injury to the area. She does not note any change in pain level with changes in weather. She states that she has tried modifying her activity, ice, ibuprofen however none of these improve her pain    The following portions of the patient's history were reviewed and updated as appropriate: allergies, current medications, past family history, past medical history, past social history, past surgical history and problem list.    Review of Systems   Constitutional: Negative. HENT: Negative. Respiratory: Negative. Cardiovascular: Negative. Gastrointestinal: Negative. Musculoskeletal: Positive for arthralgias. Skin: Negative. Neurological: Negative. OBJECTIVE      /85   Pulse 71   Resp 18   Ht 5' 2" (1.575 m)   Wt 54 kg (119 lb)   BMI 21.77 kg/m²        Physical Exam  Constitutional:       Appearance: Normal appearance. HENT:      Head: Normocephalic and atraumatic. Eyes:      General:         Right eye: No discharge. Left eye: No discharge. Cardiovascular:      Rate and Rhythm: Normal rate and regular rhythm. Pulmonary:      Effort: Pulmonary effort is normal.      Breath sounds: Normal breath sounds. Vascular:  -Right dorsalis pedis: 2+  -Right posterior tibial: 2+  -Capillary refill time <3sec to bilateral lesser digits  -Skin temp: WNL    MSK right foot:  -Pain on palpation of right first MTPJ dorsally, medially, plantarly  -Hallux IPJ ROM within normal limits  -Active range of motion lesser digits intact  -I note a moderate prominence at the dorsal and medial aspect of the 1st MTPJ of the right foot. Abducted position of hallux with no digital deformity noted  -1st MTPJ ROM is slightly decreased in dorsiflexion at approximately 50 degrees with no crepitus noted. Pain at end range dorsiflexion of the right first MTPJ, no pain throughout the rest of range of motion. -TMTJ hypermobility is not noted    -Standing exam: Rectus heel position.  1st MTPJ ROM unchanged    Neuro:  -Light sensation intact bilaterally  -Pressure sensation intact bilaterally    Derm:  -No lesions, abrasions, or open wounds noted  -erythema not noted  -Calluses are not present

## 2023-07-21 ENCOUNTER — OFFICE VISIT (OUTPATIENT)
Age: 38
End: 2023-07-21

## 2023-07-21 ENCOUNTER — APPOINTMENT (OUTPATIENT)
Dept: RADIOLOGY | Facility: CLINIC | Age: 38
End: 2023-07-21
Payer: COMMERCIAL

## 2023-07-21 VITALS
BODY MASS INDEX: 21.9 KG/M2 | HEART RATE: 71 BPM | HEIGHT: 62 IN | WEIGHT: 119 LBS | SYSTOLIC BLOOD PRESSURE: 133 MMHG | RESPIRATION RATE: 18 BRPM | DIASTOLIC BLOOD PRESSURE: 85 MMHG

## 2023-07-21 DIAGNOSIS — M79.671 RIGHT FOOT PAIN: ICD-10-CM

## 2023-07-21 PROCEDURE — 73630 X-RAY EXAM OF FOOT: CPT

## 2023-07-21 NOTE — PATIENT INSTRUCTIONS
Messi´s Extension Orthotic, 1 Piece, Carbon Fiber Insole, Very Rigid Foot Suppo [phone]  [facility]

## 2023-10-30 ENCOUNTER — APPOINTMENT (OUTPATIENT)
Dept: RADIOLOGY | Facility: CLINIC | Age: 38
End: 2023-10-30
Payer: COMMERCIAL

## 2023-10-30 ENCOUNTER — OFFICE VISIT (OUTPATIENT)
Age: 38
End: 2023-10-30
Payer: COMMERCIAL

## 2023-10-30 VITALS
BODY MASS INDEX: 21.9 KG/M2 | DIASTOLIC BLOOD PRESSURE: 76 MMHG | WEIGHT: 119 LBS | HEIGHT: 62 IN | SYSTOLIC BLOOD PRESSURE: 119 MMHG | HEART RATE: 62 BPM

## 2023-10-30 DIAGNOSIS — M20.11 HALLUX ABDUCTOVALGUS WITH BUNIONS, RIGHT: ICD-10-CM

## 2023-10-30 DIAGNOSIS — M21.611 HALLUX ABDUCTOVALGUS WITH BUNIONS, RIGHT: ICD-10-CM

## 2023-10-30 DIAGNOSIS — Z01.818 PREOPERATIVE CLEARANCE: ICD-10-CM

## 2023-10-30 DIAGNOSIS — M79.671 RIGHT FOOT PAIN: Primary | ICD-10-CM

## 2023-10-30 DIAGNOSIS — M79.671 RIGHT FOOT PAIN: ICD-10-CM

## 2023-10-30 PROCEDURE — 99213 OFFICE O/P EST LOW 20 MIN: CPT | Performed by: STUDENT IN AN ORGANIZED HEALTH CARE EDUCATION/TRAINING PROGRAM

## 2023-10-30 PROCEDURE — 73630 X-RAY EXAM OF FOOT: CPT

## 2023-10-30 RX ORDER — CHLORHEXIDINE GLUCONATE ORAL RINSE 1.2 MG/ML
15 SOLUTION DENTAL ONCE
OUTPATIENT
Start: 2023-10-30 | End: 2023-10-30

## 2023-10-30 RX ORDER — CHLORHEXIDINE GLUCONATE 4 G/100ML
SOLUTION TOPICAL DAILY PRN
OUTPATIENT
Start: 2023-10-30

## 2023-10-31 NOTE — PROGRESS NOTES
This patient was seen on 10/30/23. My role is Foot , Ankle, and Wound Specialist    ASSESSMENT     Diagnoses and all orders for this visit:    Right foot pain  -     XR foot 3+ vw right; Future  -     Case request operating room: Víctor Lopez; Standing  -     Case request operating room: BUNIONECTOMY PEDRO, STEPHANIONECTOMY YASMEEN    Hallux abductovalgus with bunions, right  -     Case request operating room: V[facility]ctor Lopez; Standing  -     Ambulatory referral to [facility]; Future  -     EKG 12 lead; Future  -     Case request operating room: BUNIONECTOMY Frutoso Fulling    Preoperative clearance  -     Ambulatory referral to [facility]; Future  -     EKG 12 lead; Future    Other orders  -     Nursing Communication Warmimg Interventions Implemented; Standing  -     Nursing Communication CHG bath, have staff wash entire body (neck down) per pre-op bathing protocol. Routine, evening prior to, and day of surgery.; Standing  -     Nursing Communication Swab both nares with Povidone-Iodine solution, EXCLUDE if patient has shellfish/Iodine allergy, and replace with nasal alcohol swabstick. Routine, day of surgery, on call to OR; Standing  -     chlorhexidine (PERIDEX) 0.12 % oral rinse 15 mL  -     Void on call to OR; Standing  -     Insert peripheral IV;  Standing  -     Diet NPO; Sips with meds; Standing  -     ceFAZolin (ANCEF) 1,000 mg in dextrose 5 % 100 mL IVPB  -     chlorhexidine (HIBICLENS) 4 % topical liquid         Problem List Items Addressed This Visit    None  Visit Diagnoses       Right foot pain    -  Primary    Relevant Orders    XR foot 3+ vw right    Case request operating room: ALEXANDERCTVíctor GUERRA (Completed)    Hallux abductovalgus with bunions, right        Relevant Orders    Case request operating room: Víctor Lopez (Completed)    Ambulatory referral to [facility]    EKG 12 lead    Preoperative clearance        Relevant Orders    Ambulatory referral to Family Practice    EKG 12 lead          PLAN  -Patient was educated regarding their condition.  -We discussed conservative treatment vs surgical intervention. The patient has been in pain for a long time with this pain affecting their activity daily. They have tried conservative modalities such as shoe inserts, changes in footwear, padding with no relief. At this point, I recommend surgical intervention as below:  -Patient will RTC as scheduled post-operatively  -I was very clear at the beginning of the discussion about alternatives to this surgery including benign neglect, bracing, and alternative surgical opinions. I spent time to discuss with the patient the surgical procedure(s) as Lapidus bunionectomy with possible akin osteotomy and treatment of other deformities as needed. pre-op testing, and post-op course (nonweightbearing 3 to 4 weeks) required to properly heal the surgery. I discussed risks as infection, scar, swelling, chronic pain, painful or prominent hardware, possible need to remove hardware, poor healing of incision or bone that could require more surgery, incomplete correction of deformities or recurrence of deformities, change in shape of foot, toe, or walking and function, numbness, neuritis/RSD, blood clots in the leg or lung, and even death from anesthesia complications. No guarantees were given and the possibility of recurrent deformity or incomplete correction were discussed before patient signed the consent form. We also discussed the need for possible anticoagulation. The offloading device necessary after the surgery will be a cam boot. The surgery, history and physical and PATS will be scheduled. -X-ray from 10/30/2023 of the right foot was interpreted by myself: No acute osseous abnormality to suggest fracture noted. No abnormalities noted within the soft tissues.   I do note moderate hallux abductovalgus deformity with hallux abductus angle approximately 20 degrees, intermetatarsal angle approximately 13 degrees, tibial sesamoid position 3, no metatarsus adductus noted, hallux abductus interphalangeus angle within normal limits. On lateral view I do note metatarsus primus elevatus    SUBJECTIVE    Chief Complaint:  Right foot pain     Patient ID: Eladio Pabon is a 43 y.o. female. 7/21/23: Alec Fountain is a 27-year-old female who presents with pain to right foot, she states that it comes and goes. she states that the pain is worse with heels, better with flats. She states that the pain has been present for a long time but has gotten progressively worse over the last 4-months. Does not recall injury to the area. She does not note any change in pain level with changes in weather. She states that she has tried modifying her activity, ice, ibuprofen however none of these improve her pain    10/30/2023: Alec Fountain states that she has attempted the recommended shoe inserts. She states that there is still pain to her right foot especially during physical activity. She is concerned about joint stiffness should she undergo operation but states that overall she is able to adjust her physical activity and is more than able to deal with the postoperative recovery process. The following portions of the patient's history were reviewed and updated as appropriate: allergies, current medications, past family history, past medical history, past social history, past surgical history and problem list.    Review of Systems   Constitutional: Negative. HENT: Negative. Respiratory: Negative. Cardiovascular: Negative. Gastrointestinal: Negative. Musculoskeletal:  Positive for arthralgias and joint swelling. Skin: Negative. Neurological: Negative.           OBJECTIVE      /76   Pulse 62   Ht 5' 2" (1.575 m)   Wt 54 kg (119 lb)   BMI 21.77 kg/m²        Physical Exam  Constitutional: Appearance: Normal appearance. HENT:      Head: Normocephalic and atraumatic. Eyes:      General:         Right eye: No discharge. Left eye: No discharge. Cardiovascular:      Rate and Rhythm: Normal rate and regular rhythm. Pulses:           Dorsalis pedis pulses are 2+ on the right side and 2+ on the left side. Posterior tibial pulses are 2+ on the right side and 2+ on the left side. Pulmonary:      Effort: Pulmonary effort is normal.      Breath sounds: Normal breath sounds. Skin:     General: Skin is warm. Capillary Refill: Capillary refill takes less than 2 seconds. Neurological:      Sensory: Sensation is intact. No sensory deficit. MSK right foot:  -Pain on palpation medial aspect of 1st MTPJ at the medial eminence  -Hallux IPJ ROM WNL  -Active range of motion lesser digits intact  -MMT 5/5 to all muscle compartments of the lower extremity  -I note a large prominence at the medial aspect of the 1st MTPJ of the right foot.  The hallux is in an abducted position with abutment of the second toe.   -Hallux abductovalgus deformity is tracking  -1st MTPJ ROM is WNL with no crepitus noted  -TMTJ hypermobility is noted    Derm:  -No lesions, abrasions, or open wounds noted  -erythema noted at medial aspect of R foot  -Calluses are not present    Neuro:  -Light sensation intact bilaterally  -Protective sensation intact bilaterally

## 2023-11-02 ENCOUNTER — TELEPHONE (OUTPATIENT)
Dept: OTHER | Facility: OTHER | Age: 38
End: 2023-11-02

## 2023-11-02 NOTE — TELEPHONE ENCOUNTER
Patient called and is asking if he office can give her a call to schedule an appointment because she need a letter for medical clearance for surgery and she need to get an EKG.

## 2023-11-20 ENCOUNTER — CONSULT (OUTPATIENT)
Dept: FAMILY MEDICINE CLINIC | Facility: CLINIC | Age: 38
End: 2023-11-20
Payer: COMMERCIAL

## 2023-11-20 VITALS
HEART RATE: 65 BPM | DIASTOLIC BLOOD PRESSURE: 78 MMHG | OXYGEN SATURATION: 98 % | SYSTOLIC BLOOD PRESSURE: 116 MMHG | WEIGHT: 124 LBS | BODY MASS INDEX: 22.82 KG/M2 | HEIGHT: 62 IN

## 2023-11-20 DIAGNOSIS — Z98.890 S/P ENDOMETRIAL ABLATION: ICD-10-CM

## 2023-11-20 DIAGNOSIS — N93.9 ABNORMAL UTERINE BLEEDING (AUB): ICD-10-CM

## 2023-11-20 DIAGNOSIS — Z01.818 PREOP EXAMINATION: ICD-10-CM

## 2023-11-20 DIAGNOSIS — J30.1 ALLERGIC RHINITIS DUE TO POLLEN, UNSPECIFIED SEASONALITY: ICD-10-CM

## 2023-11-20 DIAGNOSIS — M21.611 BUNION OF RIGHT FOOT: ICD-10-CM

## 2023-11-20 DIAGNOSIS — J45.20 MILD INTERMITTENT ASTHMA WITHOUT COMPLICATION: ICD-10-CM

## 2023-11-20 DIAGNOSIS — Z00.00 ENCOUNTER FOR SCREENING AND PREVENTATIVE CARE: Primary | ICD-10-CM

## 2023-11-20 PROCEDURE — 93000 ELECTROCARDIOGRAM COMPLETE: CPT | Performed by: FAMILY MEDICINE

## 2023-11-20 PROCEDURE — 99204 OFFICE O/P NEW MOD 45 MIN: CPT | Performed by: FAMILY MEDICINE

## 2023-11-20 NOTE — H&P (VIEW-ONLY)
FAMILY MEDICINE PRE-OPERATIVE EVALUATION  [facility] PHYSICIAN GROUP - [facility]    NAME: Sara Knott  AGE: 43 y.o. SEX: female  : 1985     DATE: 2023     Internal Medicine Pre-Operative Evaluation:     Chief Complaint: Pre-operative Evaluation     Surgery: bunionectomy  Anticipated Date of Surgery: 2023  Referring Provider: Dr. Flor Angeles      History of Present Illness:     Sara Knott is a 43 y.o. female who presents to the office today for a preoperative consultation at the request of surgeon, Dr. Flor Angeles, who plans on performing right bunionectomy on 2023. Planned anesthesia is general. Patient has a bleeding risk of: no recent abnormal bleeding. Patient does not have objections to receiving blood products if needed. Current anti-platelet/anti-coagulation medications that the patient is prescribed includes:  not currently on any medication/ no diagnosed issues with bleeding disorder . Patient has been having increasing pain and discomfort in the right foot. Is been going over for about 1 year. She has a bunion on the right foot, there may be 1 forming on the left foot as well. She is attempting to implement preventative measures for the left, but the right is severe enough for surgery at this time. The podiatrist recommended evaluation for possible surgery, it is not absolutely necessary at this time, but the recovery will likely be less time than later when symptoms may be more severe. Assessment of Chronic Conditions:   - Mild intermittent asthma-will usually get symptoms with URI.      Assessment of Cardiac Risk:  Denies unstable or severe angina or MI in the last 6 weeks or history of stent placement in the last year   Denies decompensated heart failure (e.g. New onset heart failure, NYHA functional class IV heart failure, or worsening existing heart failure)  Denies significant arrhythmias such as high grade AV block, symptomatic ventricular arrhythmia, newly recognized ventricular tachycardia, supraventricular tachycardia with resting heart rate >100, or symptomatic bradycardia  Denies severe heart valve disease including aortic stenosis or symptomatic mitral stenosis     Exercise Capacity:  Able to walk 4 blocks without symptoms?: Yes  Able to walk 2 flights without symptoms?: Yes    Prior Anesthesia Reactions: No     Personal history of venous thromboembolic disease? No    History of steroid use for >2 weeks within last year? No    STOP-BANG Sleep Apnea Screening Questionnaire:      Do you SNORE loudly (louder than talking or loud enough to be heard through closed doors)? No = 0 point   Do you often feel TIRED, fatigued, or sleepy during daytime? No = 0 point   Has anyone OBSERVED you stop breathing during your sleep? No = 0 point   Do you have or are you being treated for high blood pressure? No = 0 point   BMI more than 35 kg/m2? No = 0 point   AGE over 48years old? No = 0 point   NECK circumference > 16 inches (40 cm)? No = 0 point   Male GENDER? No = 0 point   TOTAL SCORE 0 = LOW risk of JESSICA       Review of Systems:     Review of Systems  No fever  Constitutional:  Negative for chills, fever and unexpected weight change. HENT:  Negative for congestion, ear discharge, ear pain, hearing loss, postnasal drip, rhinorrhea, sinus pressure, sinus pain and sore throat. Eyes:  Positive for visual disturbance (contacts). Respiratory:  Negative for cough, chest tightness and shortness of breath. Cardiovascular:  Negative for chest pain and palpitations. Gastrointestinal:  Negative for abdominal pain, blood in stool, constipation, diarrhea, nausea and vomiting. Endocrine: Negative for polydipsia, polyphagia and polyuria. Genitourinary:  Negative for dysuria and frequency. Skin:  Negative for rash and wound. Allergic/Immunologic: Positive for environmental allergies. Neurological:  Negative for dizziness, light-headedness and headaches. Psychiatric/Behavioral:  Negative for self-injury and suicidal ideas. Problem List:     Patient Active Problem List   Diagnosis    Mild intermittent asthma without complication    Allergic rhinitis    PMDD (premenstrual dysphoric disorder)    S/P endometrial ablation        Allergies: Allergies   Allergen Reactions    Sulfa Antibiotics      Unknown, childhood alllergy        Current Medications:       Current Outpatient Medications:     cetirizine (ZyrTEC) 10 MG chewable tablet, Chew 10 mg daily, Disp: , Rfl:     albuterol (PROVENTIL HFA,VENTOLIN HFA) 90 mcg/act inhaler, Inhale 2 puffs every 6 (six) hours as needed for wheezing (Patient not taking: Reported on 11/20/2023), Disp: , Rfl:      Past History:     Past Medical History:   Diagnosis Date    Abnormal uterine bleeding 02/01/2021    Anxiety     Anxiety disorder 09/04/2012    Procedure/Onset: 11/19/2007    Asthma     Chronic kidney disease     stones    Miscarriage 2016    Varicella     childhood        Past Surgical History:   Procedure Laterality Date    MD HYSTEROSCOPY BX ENDOMETRIUM&/POLYPC W/WO D&C N/A 04/11/2023    Procedure: DILATATION AND CURETTAGE (D&C) WITH HYSTEROSCOPY;  Surgeon: Aayush Trent MD;  [facility];  Service: Gynecology    MD HYSTEROSCOPY ENDOMETRIAL ABLATION N/A 04/11/2023    Procedure: ABLATION ENDOMETRIAL DALIA;  Surgeon: Aayush Trent MD;  [facility];  Service: Gynecology    MD [address] [facility] N/A 03/11/2016    Procedure: DILATATION AND EVACUATION (D&E);   Surgeon: Carlo Solomon MD;  [facility];  Service: Gynecology    REPAIR QUADRICEPS / HAMSTRING MUSCLE      SINUS SURGERY          Family History   Problem Relation Age of Onset    Hyperlipidemia Mother     Hyperlipidemia Father     Hypertension Father     Heart disease Father     Melanoma Father     Breast cancer Maternal Grandmother         45s    Cancer Maternal Grandmother     Vision loss Maternal Grandfather     Heart disease Paternal Grandfather     BRCA1 Positive Maternal Aunt     Breast cancer Maternal Aunt     BRCA1 Positive Maternal Aunt     No Known Problems Maternal Aunt     No Known Problems Maternal Aunt     Colon cancer Maternal Uncle 48 - 61    Cancer Paternal Uncle     Diabetes Paternal Uncle     Throat cancer Paternal Uncle     Lung cancer Paternal Uncle     Diabetes type I Paternal Uncle         Social History     Socioeconomic History    Marital status: /Civil Union     Spouse name: Not on file    Number of children: Not on file    Years of education: Not on file    Highest education level: Not on file   Occupational History    Not on file   Tobacco Use    Smoking status: Never    Smokeless tobacco: Never   Vaping Use    Vaping Use: Never used   Substance and Sexual Activity    Alcohol use: Yes     Alcohol/week: 3.0 standard drinks of alcohol     Types: 3 Glasses of wine per week     Comment: occasional    Drug use: No    Sexual activity: Yes     Partners: Male     Birth control/protection: Male Sterilization   Other Topics Concern    Not on file   Social History Narrative    Not on file     Social Determinants of Health     Financial Resource Strain: Not on file   Food Insecurity: Not on file   Transportation Needs: Not on file   Physical Activity: Not on file   Stress: Not on file   Social Connections: Not on file   Intimate Partner Violence: Not on file   Housing Stability: Not on file        Physical Exam:      /78   Pulse 65   Ht 5' 2" (1.575 m)   Wt 56.2 kg (124 lb)   SpO2 98%   BMI 22.68 kg/m²     Physical Exam  Constitutional:       General: She is not in acute distress. Appearance: Normal appearance. She is normal weight. She is not ill-appearing, toxic-appearing or diaphoretic. HENT:      Head: Normocephalic and atraumatic. Right Ear: Tympanic membrane, ear canal and external ear normal. There is no impacted cerumen.       Left Ear: Tympanic membrane, ear canal and external ear normal. There is no impacted cerumen. Nose: Nose normal. No congestion or rhinorrhea. Mouth/Throat:      Mouth: Mucous membranes are moist.      Pharynx: Oropharynx is clear. No oropharyngeal exudate or posterior oropharyngeal erythema. Eyes:      General:         Right eye: No discharge. Left eye: No discharge. Extraocular Movements: Extraocular movements intact. Pupils: Pupils are equal, round, and reactive to light. Cardiovascular:      Rate and Rhythm: Normal rate and regular rhythm. Heart sounds: Murmur (very soft, aortic, systolic) heard. No friction rub. No gallop. Pulmonary:      Effort: Pulmonary effort is normal. No respiratory distress. Breath sounds: Normal breath sounds. No stridor. No wheezing, rhonchi or rales. Abdominal:      General: Bowel sounds are normal. There is no distension. Palpations: Abdomen is soft. Tenderness: There is no abdominal tenderness. There is no guarding. Hernia: A hernia (small umbilical hernia) is present. Musculoskeletal:      Cervical back: Neck supple. No tenderness. Lymphadenopathy:      Cervical: No cervical adenopathy. Skin:     General: Skin is warm. Capillary Refill: Capillary refill takes less than 2 seconds. Neurological:      Mental Status: She is alert. Sensory: No sensory deficit (Light touch in all 4 extremities). Motor: No weakness (5/5 in all 4 extremities). Deep Tendon Reflexes: Reflexes normal (2/4, intact, C5, C6, L4, S1). Data:     Pre-operative work-up    Laboratory Results: I have personally reviewed the pertinent laboratory results/reports . Labs were performed in March 2023. Overall grossly normal.    EKG: I have personally reviewed pertinent reports.     Normal sinus rhythm, normal axis, no ST elevations or depressions/concerning findings    Chest x-ray:  Not indicated    Previous cardiopulmonary studies within the past year:  Echocardiogram: 3/2023-left ventricular cavity size normal, normal wall thickness, left ventricular ejection fraction is 58%, normal systolic and diastolic function. There is trace regurgitation on multiple valves, but no moderate to severe present  Cardiac Catheterization: None  Stress Test: None  Pulmonary Function Testing: None       Assessment:     1. Encounter for screening and preventative care        2. Preop examination        3. Mild intermittent asthma without complication        4. Allergic rhinitis due to pollen, unspecified seasonality        5. Abnormal uterine bleeding (AUB)        6. S/P endometrial ablation             Plan:     43 y.o. female with planned surgery for right bunionectomy. The patient does not have significant risk factors, and was recently cardiac cleared earlier this year on 3/ 2023. Her recent echo did not demonstrate any significant abnormalities. I personally reviewed her EKG today, within normal limits/normal sinus rhythm. Reviewed echo and recent labs in March 2023. No significant abnormalities that would prevent her from going to surgery. According to patient the surgeon has reviewed her labs and does not feel that they need to be repeated. .      Cardiac Risk Estimation: per the Revised Cardiac Risk Index (Circ. 100:1043, 1999), the patient's risk factors for cardiac complications include  None , putting her in: RCI RISK CLASS I (0 risk factors, risk of major cardiac compl. appr. 0.5%). 1. Further preoperative workup as follows:   - None; no further preoperative work-up is required    2. Medication Management/Recommendations:   - None, continue medication regimen including morning of surgery, with sip of water    3. Prophylaxis for cardiac events with perioperative beta-blockers: not indicated. 4. Patient requires further consultation with: None    Clearance  Patient is CLEARED for surgery without any additional cardiac testing.      Fausto Pineda Yvette Sands, [address] [address]  Phone#  [phone]  Fax#  [phone]

## 2023-11-20 NOTE — PROGRESS NOTES
FAMILY MEDICINE PRE-OPERATIVE EVALUATION  [facility] PHYSICIAN GROUP - [facility]    NAME: Ely Marmolejo  AGE: 43 y.o. SEX: female  : 1985     DATE: 2023     Internal Medicine Pre-Operative Evaluation:     Chief Complaint: Pre-operative Evaluation     Surgery: bunionectomy  Anticipated Date of Surgery: 2023  Referring Provider: Dr. Lawrence Boston      History of Present Illness:     Ely Marmolejo is a 43 y.o. female who presents to the office today for a preoperative consultation at the request of surgeon, Dr. Lawrence Boston, who plans on performing right bunionectomy on 2023. Planned anesthesia is general. Patient has a bleeding risk of: no recent abnormal bleeding. Patient does not have objections to receiving blood products if needed. Current anti-platelet/anti-coagulation medications that the patient is prescribed includes:  not currently on any medication/ no diagnosed issues with bleeding disorder . Patient has been having increasing pain and discomfort in the right foot. Is been going over for about 1 year. She has a bunion on the right foot, there may be 1 forming on the left foot as well. She is attempting to implement preventative measures for the left, but the right is severe enough for surgery at this time. The podiatrist recommended evaluation for possible surgery, it is not absolutely necessary at this time, but the recovery will likely be less time than later when symptoms may be more severe. Assessment of Chronic Conditions:   - Mild intermittent asthma-will usually get symptoms with URI.      Assessment of Cardiac Risk:  Denies unstable or severe angina or MI in the last 6 weeks or history of stent placement in the last year   Denies decompensated heart failure (e.g. New onset heart failure, NYHA functional class IV heart failure, or worsening existing heart failure)  Denies significant arrhythmias such as high grade AV block, symptomatic ventricular arrhythmia, newly recognized ventricular tachycardia, supraventricular tachycardia with resting heart rate >100, or symptomatic bradycardia  Denies severe heart valve disease including aortic stenosis or symptomatic mitral stenosis     Exercise Capacity:  Able to walk 4 blocks without symptoms?: Yes  Able to walk 2 flights without symptoms?: Yes    Prior Anesthesia Reactions: No     Personal history of venous thromboembolic disease? No    History of steroid use for >2 weeks within last year? No    STOP-BANG Sleep Apnea Screening Questionnaire:      Do you SNORE loudly (louder than talking or loud enough to be heard through closed doors)? No = 0 point   Do you often feel TIRED, fatigued, or sleepy during daytime? No = 0 point   Has anyone OBSERVED you stop breathing during your sleep? No = 0 point   Do you have or are you being treated for high blood pressure? No = 0 point   BMI more than 35 kg/m2? No = 0 point   AGE over 48years old? No = 0 point   NECK circumference > 16 inches (40 cm)? No = 0 point   Male GENDER? No = 0 point   TOTAL SCORE 0 = LOW risk of JESSICA       Review of Systems:     Review of Systems  No fever  Constitutional:  Negative for chills, fever and unexpected weight change. HENT:  Negative for congestion, ear discharge, ear pain, hearing loss, postnasal drip, rhinorrhea, sinus pressure, sinus pain and sore throat. Eyes:  Positive for visual disturbance (contacts). Respiratory:  Negative for cough, chest tightness and shortness of breath. Cardiovascular:  Negative for chest pain and palpitations. Gastrointestinal:  Negative for abdominal pain, blood in stool, constipation, diarrhea, nausea and vomiting. Endocrine: Negative for polydipsia, polyphagia and polyuria. Genitourinary:  Negative for dysuria and frequency. Skin:  Negative for rash and wound. Allergic/Immunologic: Positive for environmental allergies. Neurological:  Negative for dizziness, light-headedness and headaches. Psychiatric/Behavioral:  Negative for self-injury and suicidal ideas. Problem List:     Patient Active Problem List   Diagnosis    Mild intermittent asthma without complication    Allergic rhinitis    PMDD (premenstrual dysphoric disorder)    S/P endometrial ablation        Allergies: Allergies   Allergen Reactions    Sulfa Antibiotics      Unknown, childhood alllergy        Current Medications:       Current Outpatient Medications:     cetirizine (ZyrTEC) 10 MG chewable tablet, Chew 10 mg daily, Disp: , Rfl:     albuterol (PROVENTIL HFA,VENTOLIN HFA) 90 mcg/act inhaler, Inhale 2 puffs every 6 (six) hours as needed for wheezing (Patient not taking: Reported on 11/20/2023), Disp: , Rfl:      Past History:     Past Medical History:   Diagnosis Date    Abnormal uterine bleeding 02/01/2021    Anxiety     Anxiety disorder 09/04/2012    Procedure/Onset: 11/19/2007    Asthma     Chronic kidney disease     stones    Miscarriage 2016    Varicella     childhood        Past Surgical History:   Procedure Laterality Date    MS HYSTEROSCOPY BX ENDOMETRIUM&/POLYPC W/WO D&C N/A 04/11/2023    Procedure: DILATATION AND CURETTAGE (D&C) WITH HYSTEROSCOPY;  Surgeon: Tess Harada, MD;  [facility];  Service: Gynecology    MS HYSTEROSCOPY ENDOMETRIAL ABLATION N/A 04/11/2023    Procedure: ABLATION ENDOMETRIAL DALIA;  Surgeon: Tess Harada, MD;  [facility];  Service: Gynecology    MS [address] [facility] N/A 03/11/2016    Procedure: DILATATION AND EVACUATION (D&E);   Surgeon: Shanelle Correia MD;  [facility];  Service: Gynecology    REPAIR QUADRICEPS / HAMSTRING MUSCLE      SINUS SURGERY          Family History   Problem Relation Age of Onset    Hyperlipidemia Mother     Hyperlipidemia Father     Hypertension Father     Heart disease Father     Melanoma Father     Breast cancer Maternal Grandmother         45s    Cancer Maternal Grandmother     Vision loss Maternal Grandfather     Heart disease Paternal Grandfather     BRCA1 Positive Maternal Aunt     Breast cancer Maternal Aunt     BRCA1 Positive Maternal Aunt     No Known Problems Maternal Aunt     No Known Problems Maternal Aunt     Colon cancer Maternal Uncle 48 - 61    Cancer Paternal Uncle     Diabetes Paternal Uncle     Throat cancer Paternal Uncle     Lung cancer Paternal Uncle     Diabetes type I Paternal Uncle         Social History     Socioeconomic History    Marital status: /Civil Union     Spouse name: Not on file    Number of children: Not on file    Years of education: Not on file    Highest education level: Not on file   Occupational History    Not on file   Tobacco Use    Smoking status: Never    Smokeless tobacco: Never   Vaping Use    Vaping Use: Never used   Substance and Sexual Activity    Alcohol use: Yes     Alcohol/week: 3.0 standard drinks of alcohol     Types: 3 Glasses of wine per week     Comment: occasional    Drug use: No    Sexual activity: Yes     Partners: Male     Birth control/protection: Male Sterilization   Other Topics Concern    Not on file   Social History Narrative    Not on file     Social Determinants of Health     Financial Resource Strain: Not on file   Food Insecurity: Not on file   Transportation Needs: Not on file   Physical Activity: Not on file   Stress: Not on file   Social Connections: Not on file   Intimate Partner Violence: Not on file   Housing Stability: Not on file        Physical Exam:      /78   Pulse 65   Ht 5' 2" (1.575 m)   Wt 56.2 kg (124 lb)   SpO2 98%   BMI 22.68 kg/m²     Physical Exam  Constitutional:       General: She is not in acute distress. Appearance: Normal appearance. She is normal weight. She is not ill-appearing, toxic-appearing or diaphoretic. HENT:      Head: Normocephalic and atraumatic. Right Ear: Tympanic membrane, ear canal and external ear normal. There is no impacted cerumen.       Left Ear: Tympanic membrane, ear canal and external ear normal. There is no impacted cerumen. Nose: Nose normal. No congestion or rhinorrhea. Mouth/Throat:      Mouth: Mucous membranes are moist.      Pharynx: Oropharynx is clear. No oropharyngeal exudate or posterior oropharyngeal erythema. Eyes:      General:         Right eye: No discharge. Left eye: No discharge. Extraocular Movements: Extraocular movements intact. Pupils: Pupils are equal, round, and reactive to light. Cardiovascular:      Rate and Rhythm: Normal rate and regular rhythm. Heart sounds: Murmur (very soft, aortic, systolic) heard. No friction rub. No gallop. Pulmonary:      Effort: Pulmonary effort is normal. No respiratory distress. Breath sounds: Normal breath sounds. No stridor. No wheezing, rhonchi or rales. Abdominal:      General: Bowel sounds are normal. There is no distension. Palpations: Abdomen is soft. Tenderness: There is no abdominal tenderness. There is no guarding. Hernia: A hernia (small umbilical hernia) is present. Musculoskeletal:      Cervical back: Neck supple. No tenderness. Lymphadenopathy:      Cervical: No cervical adenopathy. Skin:     General: Skin is warm. Capillary Refill: Capillary refill takes less than 2 seconds. Neurological:      Mental Status: She is alert. Sensory: No sensory deficit (Light touch in all 4 extremities). Motor: No weakness (5/5 in all 4 extremities). Deep Tendon Reflexes: Reflexes normal (2/4, intact, C5, C6, L4, S1). Data:     Pre-operative work-up    Laboratory Results: I have personally reviewed the pertinent laboratory results/reports . Labs were performed in March 2023. Overall grossly normal.    EKG: I have personally reviewed pertinent reports.     Normal sinus rhythm, normal axis, no ST elevations or depressions/concerning findings    Chest x-ray:  Not indicated    Previous cardiopulmonary studies within the past year:  Echocardiogram: 3/2023-left ventricular cavity size normal, normal wall thickness, left ventricular ejection fraction is 07%, normal systolic and diastolic function. There is trace regurgitation on multiple valves, but no moderate to severe present  Cardiac Catheterization: None  Stress Test: None  Pulmonary Function Testing: None       Assessment:     1. Encounter for screening and preventative care        2. Preop examination        3. Mild intermittent asthma without complication        4. Allergic rhinitis due to pollen, unspecified seasonality        5. Abnormal uterine bleeding (AUB)        6. S/P endometrial ablation             Plan:     43 y.o. female with planned surgery for right bunionectomy. The patient does not have significant risk factors, and was recently cardiac cleared earlier this year on 3/ 2023. Her recent echo did not demonstrate any significant abnormalities. I personally reviewed her EKG today, within normal limits/normal sinus rhythm. Reviewed echo and recent labs in March 2023. No significant abnormalities that would prevent her from going to surgery. According to patient the surgeon has reviewed her labs and does not feel that they need to be repeated. .      Cardiac Risk Estimation: per the Revised Cardiac Risk Index (Circ. 100:1043, 1999), the patient's risk factors for cardiac complications include  None , putting her in: RCI RISK CLASS I (0 risk factors, risk of major cardiac compl. appr. 0.5%). 1. Further preoperative workup as follows:   - None; no further preoperative work-up is required    2. Medication Management/Recommendations:   - None, continue medication regimen including morning of surgery, with sip of water    3. Prophylaxis for cardiac events with perioperative beta-blockers: not indicated. 4. Patient requires further consultation with: None    Clearance  Patient is CLEARED for surgery without any additional cardiac testing.      Guille Salgado [facility], [address] [address]  Phone#  [phone]  Fax#  [phone]

## 2023-11-20 NOTE — PROGRESS NOTES
New Patient Outpatient Note    HPI:     Bella Sue, 43 y.o. female  presents today as a new patient, to establish care, and to obtain a clearance for surgery. The patient has been following with podiatry for a right bunion that has been progressively getting worse over the last year. She is interested in treatment so that it does not continue to get worse and have a more extensive surgery later. She has a history of asthma and seasonal allergies. Previously she did have issues with abnormal uterine bleeding, but she is status post D&C and endometrial ablation. Thankfully her symptoms have resolved since then. She only requires albuterol as needed in times of URI more significant wheeze. Family Hx  UTD in chart    Past Medical History:   Diagnosis Date    Abnormal uterine bleeding 02/01/2021    Anxiety     Anxiety disorder 09/04/2012    Procedure/Onset: 11/19/2007    Asthma     Chronic kidney disease     stones    Miscarriage 2016    Varicella     childhood        Past Surgical History:   Procedure Laterality Date    LA HYSTEROSCOPY BX ENDOMETRIUM&/POLYPC W/WO D&C N/A 04/11/2023    Procedure: DILATATION AND CURETTAGE (D&C) WITH HYSTEROSCOPY;  Surgeon: Marina Velez MD;  [facility];  Service: Gynecology    LA HYSTEROSCOPY ENDOMETRIAL ABLATION N/A 04/11/2023    Procedure: ABLATION ENDOMETRIAL DALIA;  Surgeon: Marina Velez MD;  [facility];  Service: Gynecology    LA [address] [place] [address] N/A 03/11/2016    Procedure: DILATATION AND EVACUATION (D&E);   Surgeon: Meng Colunga MD;  Location[facility];  Service: Gynecology    REPAIR QUADRICEPS / HAMSTRING MUSCLE      SINUS SURGERY            Current Outpatient Medications:     cetirizine (ZyrTEC) 10 MG chewable tablet, Chew 10 mg daily, Disp: , Rfl:     albuterol (PROVENTIL HFA,VENTOLIN HFA) 90 mcg/act inhaler, Inhale 2 puffs every 6 (six) hours as needed for wheezing (Patient not taking: Reported on 11/20/2023), Disp: , Rfl:      SOCIAL:   Rent/Own: Own  Currently living with: 2 children  Stable food: Yes  Safe At Home: Yes  Hobbies:  running, exercise, ride bike,  soccer/track- [employer]  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 HENT:  Negative for congestion, ear discharge, ear pain, hearing loss, postnasal drip, rhinorrhea, sinus pressure, sinus pain and sore throat. Eyes:  Positive for visual disturbance (contacts). Respiratory:  Negative for cough, chest tightness and shortness of breath. Cardiovascular:  Negative for chest pain and palpitations. Gastrointestinal:  Negative for abdominal pain, blood in stool, constipation, diarrhea, nausea and vomiting. Endocrine: Negative for polydipsia, polyphagia and polyuria. Genitourinary:  Negative for dysuria and frequency. Skin:  Negative for rash and wound. Allergic/Immunologic: Positive for environmental allergies. Neurological:  Negative for dizziness, light-headedness and headaches. Psychiatric/Behavioral:  Negative for self-injury and suicidal ideas. OBJECTIVE  Vitals:    11/20/23 0823   BP: 116/78   Pulse: 65   SpO2: 98%          Physical Exam  Constitutional:       General: She is not in acute distress. Appearance: Normal appearance. She is normal weight. She is not ill-appearing, toxic-appearing or diaphoretic. HENT:      Head: Normocephalic and atraumatic.       Right Ear: Tympanic membrane, ear canal and external ear normal. There is no impacted cerumen. Left Ear: Tympanic membrane, ear canal and external ear normal. There is no impacted cerumen. Nose: Nose normal. No congestion or rhinorrhea. Mouth/Throat:      Mouth: Mucous membranes are moist.      Pharynx: Oropharynx is clear. No oropharyngeal exudate or posterior oropharyngeal erythema. Eyes:      General:         Right eye: No discharge. Left eye: No discharge. Extraocular Movements: Extraocular movements intact. Pupils: Pupils are equal, round, and reactive to light. Cardiovascular:      Rate and Rhythm: Normal rate and regular rhythm. Heart sounds: Murmur (very soft, aortic, systolic) heard. No friction rub. No gallop. Pulmonary:      Effort: Pulmonary effort is normal. No respiratory distress. Breath sounds: Normal breath sounds. No stridor. No wheezing, rhonchi or rales. Abdominal:      General: Bowel sounds are normal. There is no distension. Palpations: Abdomen is soft. Tenderness: There is no abdominal tenderness. There is no guarding. Hernia: A hernia (small umbilical hernia) is present. Musculoskeletal:      Cervical back: Neck supple. No tenderness. Lymphadenopathy:      Cervical: No cervical adenopathy. Skin:     General: Skin is warm. Capillary Refill: Capillary refill takes less than 2 seconds. Neurological:      Mental Status: She is alert. Sensory: No sensory deficit (Light touch in all 4 extremities). Motor: No weakness (5/5 in all 4 extremities). Deep Tendon Reflexes: Reflexes normal (2/4, intact, C5, C6, L4, S1). ASSESSMENT AND PLAN   Patient presents for preop [place] [facility] was seen today for pre-op exam.  Diagnoses and all orders for this visit:    Encounter for screening and preventative care  Preop examination   evaluation and to establish care.   She previously had labs that demonstrated normal kidney function, electrolytes, glucose, cell lines.  May want to consider follow-up CMP in 6 months when she follows up for physical.    Mild intermittent asthma without complication  Allergic rhinitis due to pollen, unspecified seasonality  Well-controlled currently with zyrtec. Recommended that she continue to have albuterol at home as needed for exacerbations. She is to return to the office if not improving with albuterol every 4-6 hours. Abnormal uterine bleeding (AUB)  S/P endometrial ablation  Resolved. Patient no longer has significant bleeding after ablation and D&C.            Nick Campbell DO  [facility]  11/20/2023 11:04 AM

## 2023-12-04 NOTE — PRE-PROCEDURE INSTRUCTIONS
Pre-Surgery Instructions:   Medication Instructions    albuterol (PROVENTIL HFA,VENTOLIN HFA) 90 mcg/act inhaler Uses PRN- OK to take day of surgery    cetirizine (ZyrTEC) 10 MG chewable tablet Hold day of surgery. Medication instructions for day surgery reviewed. Please use only a sip of water to take your instructed medications. Avoid all over the counter vitamins, supplements and NSAIDS for one week prior to surgery per anesthesia guidelines. Tylenol is ok to take as needed. You will receive a call one business day prior to surgery with an arrival time and hospital directions. If your surgery is scheduled on a Monday, the hospital will be calling you on the Friday prior to your surgery. If you have not heard from anyone by 8pm, please call the hospital supervisor through the hospital  at [phone]. Marylou Jefferson [phone]). Do not eat or drink anything after midnight the night before your surgery, including candy, mints, lifesavers, or chewing gum. Do not drink alcohol 24hrs before your surgery. Try not to smoke at least 24hrs before your surgery. Follow the pre surgery showering instructions as listed in the [facility] Surgical Experience Booklet” or otherwise provided by your surgeon's office. Do not use a blade to shave the surgical area 1 week before surgery. It is okay to use a clean electric clippers up to 24 hours before surgery. Do not apply any lotions, creams, including makeup, cologne, deodorant, or perfumes after showering on the day of your surgery. Do not use dry shampoo, hair spray, hair gel, or any type of hair products. No contact lenses, eye make-up, or artificial eyelashes. Remove nail polish, including gel polish, and any artificial, gel, or acrylic nails if possible. Remove all jewelry including rings and body piercing jewelry. Wear causal clothing that is easy to take on and off. Consider your type of surgery. Keep any valuables, jewelry, piercings at home. Please bring any specially ordered equipment (sling, braces) if indicated. Arrange for a responsible person to drive you to and from the hospital on the day of your surgery. Visitor Guidelines discussed. Call the surgeon's office with any new illnesses, exposures, or additional questions prior to surgery. Please reference your [facility] Surgical Experience Booklet” for additional information to prepare for your upcoming surgery.

## 2023-12-12 ENCOUNTER — ANESTHESIA EVENT (OUTPATIENT)
Dept: PERIOP | Facility: HOSPITAL | Age: 38
End: 2023-12-12
Payer: COMMERCIAL

## 2023-12-12 ENCOUNTER — APPOINTMENT (OUTPATIENT)
Dept: RADIOLOGY | Facility: HOSPITAL | Age: 38
End: 2023-12-12
Payer: COMMERCIAL

## 2023-12-12 ENCOUNTER — HOSPITAL ENCOUNTER (OUTPATIENT)
Facility: HOSPITAL | Age: 38
Setting detail: OUTPATIENT SURGERY
Discharge: HOME | End: 2023-12-12
Attending: STUDENT IN AN ORGANIZED HEALTH CARE EDUCATION/TRAINING PROGRAM | Admitting: STUDENT IN AN ORGANIZED HEALTH CARE EDUCATION/TRAINING PROGRAM
Payer: COMMERCIAL

## 2023-12-12 ENCOUNTER — ANESTHESIA (OUTPATIENT)
Dept: PERIOP | Facility: HOSPITAL | Age: 38
End: 2023-12-12
Payer: COMMERCIAL

## 2023-12-12 VITALS
BODY MASS INDEX: 22.6 KG/M2 | HEART RATE: 82 BPM | SYSTOLIC BLOOD PRESSURE: 117 MMHG | WEIGHT: 122.8 LBS | TEMPERATURE: 98.2 F | HEIGHT: 62 IN | RESPIRATION RATE: 20 BRPM | DIASTOLIC BLOOD PRESSURE: 58 MMHG | OXYGEN SATURATION: 100 %

## 2023-12-12 DIAGNOSIS — Z98.890 POSTOPERATIVE STATE: Primary | ICD-10-CM

## 2023-12-12 LAB
EXT PREGNANCY TEST URINE: NEGATIVE
EXT. CONTROL: NORMAL

## 2023-12-12 PROCEDURE — C1713 ANCHOR/SCREW BN/BN,TIS/BN: HCPCS | Performed by: STUDENT IN AN ORGANIZED HEALTH CARE EDUCATION/TRAINING PROGRAM

## 2023-12-12 PROCEDURE — 28297 COR HLX VLGS JT ARTHRD: CPT | Performed by: STUDENT IN AN ORGANIZED HEALTH CARE EDUCATION/TRAINING PROGRAM

## 2023-12-12 PROCEDURE — C9290 INJ, BUPIVACAINE LIPOSOME: HCPCS | Performed by: STUDENT IN AN ORGANIZED HEALTH CARE EDUCATION/TRAINING PROGRAM

## 2023-12-12 PROCEDURE — 99024 POSTOP FOLLOW-UP VISIT: CPT | Performed by: STUDENT IN AN ORGANIZED HEALTH CARE EDUCATION/TRAINING PROGRAM

## 2023-12-12 PROCEDURE — 73620 X-RAY EXAM OF FOOT: CPT

## 2023-12-12 PROCEDURE — C1781 MESH (IMPLANTABLE): HCPCS | Performed by: STUDENT IN AN ORGANIZED HEALTH CARE EDUCATION/TRAINING PROGRAM

## 2023-12-12 PROCEDURE — 81025 URINE PREGNANCY TEST: CPT | Performed by: STUDENT IN AN ORGANIZED HEALTH CARE EDUCATION/TRAINING PROGRAM

## 2023-12-12 DEVICE — LOCKING SCREWS
Type: IMPLANTABLE DEVICE | Site: FOOT | Status: FUNCTIONAL
Brand: FASTPITCH 2.7MM HIGH PITCH LOCKING SCREW

## 2023-12-12 DEVICE — GRAFT BONE PUTTY 1ML: Type: IMPLANTABLE DEVICE | Site: FOOT | Status: FUNCTIONAL

## 2023-12-12 DEVICE — NITINOL STAPLE,10MM X 10/10MM
Type: IMPLANTABLE DEVICE | Site: FOOT | Status: FUNCTIONAL
Brand: ELASTIC STAPLES

## 2023-12-12 DEVICE — ANATOMIC BIPLANAR FIXATION
Type: IMPLANTABLE DEVICE | Site: FOOT | Status: FUNCTIONAL
Brand: LAPIPLASTY SYSTEM 1

## 2023-12-12 RX ORDER — PROPOFOL 10 MG/ML
INJECTION, EMULSION INTRAVENOUS AS NEEDED
Status: DISCONTINUED | OUTPATIENT
Start: 2023-12-12 | End: 2023-12-12

## 2023-12-12 RX ORDER — SODIUM CHLORIDE, SODIUM LACTATE, POTASSIUM CHLORIDE, CALCIUM CHLORIDE 600; 310; 30; 20 MG/100ML; MG/100ML; MG/100ML; MG/100ML
125 INJECTION, SOLUTION INTRAVENOUS CONTINUOUS
Status: DISCONTINUED | OUTPATIENT
Start: 2023-12-12 | End: 2023-12-12 | Stop reason: HOSPADM

## 2023-12-12 RX ORDER — DEXAMETHASONE SODIUM PHOSPHATE 10 MG/ML
INJECTION, SOLUTION INTRAMUSCULAR; INTRAVENOUS AS NEEDED
Status: DISCONTINUED | OUTPATIENT
Start: 2023-12-12 | End: 2023-12-12

## 2023-12-12 RX ORDER — ONDANSETRON 2 MG/ML
4 INJECTION INTRAMUSCULAR; INTRAVENOUS ONCE AS NEEDED
Status: DISCONTINUED | OUTPATIENT
Start: 2023-12-12 | End: 2023-12-12 | Stop reason: HOSPADM

## 2023-12-12 RX ORDER — CHLORHEXIDINE GLUCONATE ORAL RINSE 1.2 MG/ML
15 SOLUTION DENTAL ONCE
Status: COMPLETED | OUTPATIENT
Start: 2023-12-12 | End: 2023-12-12

## 2023-12-12 RX ORDER — IBUPROFEN 600 MG/1
600 TABLET ORAL EVERY 6 HOURS PRN
Qty: 30 TABLET | Refills: 0 | Status: SHIPPED | OUTPATIENT
Start: 2023-12-12

## 2023-12-12 RX ORDER — GLYCOPYRROLATE 0.2 MG/ML
INJECTION INTRAMUSCULAR; INTRAVENOUS AS NEEDED
Status: DISCONTINUED | OUTPATIENT
Start: 2023-12-12 | End: 2023-12-12

## 2023-12-12 RX ORDER — OXYCODONE HYDROCHLORIDE AND ACETAMINOPHEN 5; 325 MG/1; MG/1
1 TABLET ORAL EVERY 6 HOURS PRN
Qty: 24 TABLET | Refills: 0 | Status: SHIPPED | OUTPATIENT
Start: 2023-12-12 | End: 2023-12-22

## 2023-12-12 RX ORDER — MAGNESIUM HYDROXIDE 1200 MG/15ML
LIQUID ORAL AS NEEDED
Status: DISCONTINUED | OUTPATIENT
Start: 2023-12-12 | End: 2023-12-12 | Stop reason: HOSPADM

## 2023-12-12 RX ORDER — FENTANYL CITRATE 50 UG/ML
INJECTION, SOLUTION INTRAMUSCULAR; INTRAVENOUS AS NEEDED
Status: DISCONTINUED | OUTPATIENT
Start: 2023-12-12 | End: 2023-12-12

## 2023-12-12 RX ORDER — CEFAZOLIN SODIUM 1 G/50ML
1000 SOLUTION INTRAVENOUS ONCE
Status: DISCONTINUED | OUTPATIENT
Start: 2023-12-12 | End: 2023-12-12 | Stop reason: HOSPADM

## 2023-12-12 RX ORDER — FENTANYL CITRATE/PF 50 MCG/ML
25 SYRINGE (ML) INJECTION
Status: DISCONTINUED | OUTPATIENT
Start: 2023-12-12 | End: 2023-12-12 | Stop reason: HOSPADM

## 2023-12-12 RX ORDER — CEFAZOLIN SODIUM 1 G/50ML
SOLUTION INTRAVENOUS AS NEEDED
Status: DISCONTINUED | OUTPATIENT
Start: 2023-12-12 | End: 2023-12-12

## 2023-12-12 RX ORDER — ONDANSETRON 2 MG/ML
INJECTION INTRAMUSCULAR; INTRAVENOUS AS NEEDED
Status: DISCONTINUED | OUTPATIENT
Start: 2023-12-12 | End: 2023-12-12

## 2023-12-12 RX ORDER — EPHEDRINE SULFATE 50 MG/ML
INJECTION INTRAVENOUS AS NEEDED
Status: DISCONTINUED | OUTPATIENT
Start: 2023-12-12 | End: 2023-12-12

## 2023-12-12 RX ORDER — ACETAMINOPHEN 325 MG/1
650 TABLET ORAL EVERY 6 HOURS PRN
Status: DISCONTINUED | OUTPATIENT
Start: 2023-12-12 | End: 2023-12-12 | Stop reason: HOSPADM

## 2023-12-12 RX ORDER — MIDAZOLAM HYDROCHLORIDE 2 MG/2ML
INJECTION, SOLUTION INTRAMUSCULAR; INTRAVENOUS AS NEEDED
Status: DISCONTINUED | OUTPATIENT
Start: 2023-12-12 | End: 2023-12-12

## 2023-12-12 RX ORDER — SODIUM CHLORIDE, SODIUM LACTATE, POTASSIUM CHLORIDE, CALCIUM CHLORIDE 600; 310; 30; 20 MG/100ML; MG/100ML; MG/100ML; MG/100ML
INJECTION, SOLUTION INTRAVENOUS CONTINUOUS PRN
Status: DISCONTINUED | OUTPATIENT
Start: 2023-12-12 | End: 2023-12-12

## 2023-12-12 RX ORDER — LIDOCAINE HYDROCHLORIDE 10 MG/ML
INJECTION, SOLUTION EPIDURAL; INFILTRATION; INTRACAUDAL; PERINEURAL AS NEEDED
Status: DISCONTINUED | OUTPATIENT
Start: 2023-12-12 | End: 2023-12-12

## 2023-12-12 RX ORDER — CHLORHEXIDINE GLUCONATE 4 G/100ML
SOLUTION TOPICAL DAILY PRN
Status: DISCONTINUED | OUTPATIENT
Start: 2023-12-12 | End: 2023-12-12 | Stop reason: HOSPADM

## 2023-12-12 RX ORDER — OXYCODONE HYDROCHLORIDE 5 MG/1
5 TABLET ORAL EVERY 4 HOURS PRN
Status: DISCONTINUED | OUTPATIENT
Start: 2023-12-12 | End: 2023-12-12 | Stop reason: HOSPADM

## 2023-12-12 RX ADMIN — CHLORHEXIDINE GLUCONATE 15 ML: 1.2 RINSE ORAL at 08:14

## 2023-12-12 RX ADMIN — MIDAZOLAM 2 MG: 1 INJECTION INTRAMUSCULAR; INTRAVENOUS at 09:42

## 2023-12-12 RX ADMIN — FENTANYL CITRATE 25 MCG: 50 INJECTION INTRAMUSCULAR; INTRAVENOUS at 12:34

## 2023-12-12 RX ADMIN — GLYCOPYRROLATE 0.2 MG: 0.2 INJECTION, SOLUTION INTRAMUSCULAR; INTRAVENOUS at 10:24

## 2023-12-12 RX ADMIN — PROPOFOL 150 MG: 10 INJECTION, EMULSION INTRAVENOUS at 09:45

## 2023-12-12 RX ADMIN — CEFAZOLIN SODIUM 1000 MG: 1 SOLUTION INTRAVENOUS at 09:42

## 2023-12-12 RX ADMIN — FENTANYL CITRATE 50 MCG: 50 INJECTION INTRAMUSCULAR; INTRAVENOUS at 11:32

## 2023-12-12 RX ADMIN — FENTANYL CITRATE 50 MCG: 50 INJECTION INTRAMUSCULAR; INTRAVENOUS at 09:45

## 2023-12-12 RX ADMIN — ONDANSETRON 4 MG: 2 INJECTION INTRAMUSCULAR; INTRAVENOUS at 09:54

## 2023-12-12 RX ADMIN — EPHEDRINE SULFATE 10 MG: 50 INJECTION, SOLUTION INTRAVENOUS at 10:13

## 2023-12-12 RX ADMIN — SODIUM CHLORIDE, SODIUM LACTATE, POTASSIUM CHLORIDE, AND CALCIUM CHLORIDE 125 ML/HR: .6; .31; .03; .02 INJECTION, SOLUTION INTRAVENOUS at 08:14

## 2023-12-12 RX ADMIN — DEXAMETHASONE SODIUM PHOSPHATE 10 MG: 10 INJECTION, SOLUTION INTRAMUSCULAR; INTRAVENOUS at 09:54

## 2023-12-12 RX ADMIN — PROPOFOL 50 MG: 10 INJECTION, EMULSION INTRAVENOUS at 09:46

## 2023-12-12 RX ADMIN — SODIUM CHLORIDE, SODIUM LACTATE, POTASSIUM CHLORIDE, AND CALCIUM CHLORIDE: .6; .31; .03; .02 INJECTION, SOLUTION INTRAVENOUS at 09:41

## 2023-12-12 RX ADMIN — LIDOCAINE HYDROCHLORIDE 50 MG: 10 INJECTION, SOLUTION EPIDURAL; INFILTRATION; INTRACAUDAL at 09:45

## 2023-12-12 RX ADMIN — BUPIVACAINE 20 ML: 13.3 INJECTION, SUSPENSION, LIPOSOMAL INFILTRATION at 11:22

## 2023-12-12 NOTE — PERIOPERATIVE NURSING NOTE
Patient received into [facility] via stretcher from [facility]. Patient awake and alert, states pain is a 6/10 throbbing in big toe. Right foot dressing with splint and ace bandage clean, dry and intact. Patient tolerating po fluids well.

## 2023-12-12 NOTE — QUICK NOTE
Patient seen and examined in same-day surgery center. Patient was clinically evaluated and all of paper chart was reviewed. Patient was not found to have any sort of changes in the last 30 days since they were seen and evaluated by their primary care provider. Patient is still consistent with findings of being medically cleared for procedure.     /76   Pulse 56   Temp 98.1 °F (36.7 °C) (Temporal)   Resp 18   Ht 5' 2" (1.575 m)   Wt 55.7 kg (122 lb 12.7 oz)   SpO2 100%   BMI 22.46 kg/m²

## 2023-12-12 NOTE — INTERVAL H&P NOTE
H&P reviewed. After examining the patient I find no changes in the patients condition since the H&P had been written.     Vitals:    12/12/23 0802   BP: 140/76   Pulse: 56   Resp: 18   Temp: 98.1 °F (36.7 °C)   SpO2: 100%

## 2023-12-12 NOTE — DISCHARGE SUMMARY
Discharge Summary Outpatient Procedure Podiatry -   Eladio Pabon 43 y.o. female MRN: [id number]  Unit/Bed#: OR POOL Encounter: [id number]    Admission Date: 12/12/2023     Admitting Diagnosis: Right foot pain [M79.671]  Hallux abductovalgus with bunions, right [M20.11, M21.611]    Discharge Diagnosis: same    Procedures Performed: Emma Faustino:   BUNIONECTOMY AKIN: 17578 (CPT®)    Complications: none    Condition at Discharge: stable    Discharge instructions/Information to patient and family:   See after visit summary for information provided to patient and family. Provisions for Follow-Up Care/Important appointments:  See after visit summary for information related to follow-up care and any pertinent home health orders. Discharge Medications:  See after visit summary for reconciled discharge medications provided to patient and family.

## 2023-12-12 NOTE — DISCHARGE INSTR - AVS FIRST PAGE
Dr. Miranda Perkins    1. Take your prescribed medication as directed. 2. Upon arrival at home, lie down and elevate your surgical foot on 2 pillows. 3. Stay off your feet as much as possible for the first 24-48 hours. This is when your feet first swell and may become painful. After 48 hours you may begin limited walking following these restrictions:   Nonweightbearing to surgical foot  4. Drink large quantities of water. Consume no alcohol. Continue a well-balanced diet. 5. Report any unusual discomfort or fever to this office. 6. A limited amount of discomfort and swelling is to be expected. In some cases the skin may take on a bruised appearance. The surgical cleansing solution that was applied to your foot prior to the operation is dark in color and the operation site may appear to be oozing when it actually is not. 7. A slight amount of blood is to be expected, and is no cause for alarm. Do not remove the dressings. If there is active bleeding and if the bleeding persists, add additional gauze to the bandage, apply direct pressure, elevate your feet and call this office. 8. Do not get the dressings wet. As regular bathing may be inconvenient, sponge baths are recommended. 9. When anesthesia wears off and if any discomfort should be present, apply an ice pack directly over the operated area for 15 minute intervals for several hours or until the pain leaves. (USE IN EXCESS OF 15 MINUTES COULD CAUSE FROSTBITE). Do not use hot water bags or electric pads. A convenient icepack can be made by placing ice cubes in a plastic bag and covering this with a towel. 10. Take over-the-counter laxative for constipation, this is common with use of narcotic medications.

## 2023-12-12 NOTE — ANESTHESIA PREPROCEDURE EVALUATION
Procedure:  BUNIONECTOMY PEDRO (Right: Foot)  BUNIONECTOMY Gingersaurabh Eliceo (Right: Foot)    Relevant Problems   NEURO/PSYCH   (+) PMDD (premenstrual dysphoric disorder)      PULMONARY   (+) Mild intermittent asthma without complication        Physical Exam    Airway    Mallampati score: II  TM Distance: >3 FB  Neck ROM: full     Dental   No notable dental hx     Cardiovascular  Cardiovascular exam normal    Pulmonary  Pulmonary exam normal     Other Findings  post-pubertal.      Anesthesia Plan  ASA Score- 2     Anesthesia Type- general with ASA Monitors. Additional Monitors:     Airway Plan: LMA. Plan Factors-Exercise tolerance (METS): >4 METS. Chart reviewed. Existing labs reviewed. Patient summary reviewed. Patient is not a current smoker. Induction-     Postoperative Plan-     Informed Consent- Anesthetic plan and risks discussed with patient. I personally reviewed this patient with the CRNA. Discussed and agreed on the Anesthesia Plan with the CRNA. Rosmery Rich

## 2023-12-12 NOTE — PERIOPERATIVE NURSING NOTE
Discharge instructions reviewed verbally and written with patient and mother, both receptive. Patient escorted to car via wheelchair.

## 2023-12-12 NOTE — OP NOTE
OPERATIVE REPORT - Podiatry  PATIENT NAME: Minerva Huynh    :  1985  MRN: [id number]  Pt Location: [facility] OR ROOM 02    SURGERY DATE: 2023    Surgeon(s) and Role:     Cassidy Zurita DPM - Primary    Pre-op Diagnosis:  Right foot pain [M79.671]  Hallux abductovalgus with bunions, right [M20.11, M21.611]    Post-Op Diagnosis Codes:     * Right foot pain [M79.671]     * Hallux abductovalgus with bunions, right [M20.11, M21.611]    Procedure(s) (LRB):  LAPIDIS BUNIONECTOMY (Right)  BUNIONECTOMY ELIDIA (Right)    Specimen(s):  * No specimens in log *    Estimated Blood Loss:   Minimal    Drains:  * No LDAs found *    Anesthesia Type:   General with 18 ml of 1% Lidocaine and 0.5% Bupivacaine in a 1:1 mixture    Hemostasis:  Pneumatic ankle tourniquet set at 250 mmHg for 94 mins  Direct compression, electrocautery    Materials:  Implant Name Type Inv. Item Serial No.  Lot No. LRB No. Used Action   IMPLANT ANATOMIC BIPLANAR LAPIPLASTY SYS 1 - [id number]  IMPLANT ANATOMIC BIPLANAR LAPIPLASTY SYS 1  [id number] Right 1 Implanted   SCREW LCK 2.7 X 16MM HIGH PITCH FASTPITCH - [id number]  SCREW LCK 2.7 X 16MM HIGH PITCH ALTAGRACIA [facility] MEDICAL "[employer] INC [id number] Right 1 Implanted   GRAFT BONE PUTTY 1ML - [id number]  GRAFT BONE PUTTY 1ML [id number] [id number]  Right 1 Implanted   STAPLE NITINOL SYMMETRICAL 10 X 10MM - [id number]  STAPLE NITINOL SYMMETRICAL 10 X 10MM  [employer] [id number] Right 1 Implanted         Injectables:  none    Operative Findings:  Adequate reduction of the intermetatarsal angle and correction of hallux abductovalgus. Complications:   None    Procedure and Technique:     Under mild sedation, the patient was brought into the operating room and placed on the operating table in supine position.  IV sedation was achieved by anesthesia team and a universal timeout was performed where all parties are in agreement of correct patient, correct procedure and correct site. A pneumatic tourniquet was then placed over the patient's right lower extremity with ample padding. A regional block was performed with local anesthetic. The foot was then prepped and draped in the usual aseptic manner. An esmarch bandage was used to exsangunate the foot and the pneumatic tourniquet was then inflated to 250 mmHg. Dorsal medial incision was made over the medial column of the foot. Protecting neurovasculature and extensor hallucis longus tendon tarsometatarsal joint was exposed and periosteum was retracted laterally and medially. Sagittal saw was placed free of joint. Rotation pin was placed in the metatarsal.  Attention was drawn distally where percutaneous lateral release of the metatarsophalangeal joint was achieved with #15 blade. Metatarsal was able to freely rotate. Under fluoroscopy guidance cut guide was placed and reduction clamp with adequate correction of intermetatarsal angle with the great toe in maximal dorsiflexion. Cut guide was then held in place with 3 pins. Sagittal saw was used to remove tarsometatarsal cartilaginous surfaces of the joint. Site was irrigated with sterile saline. Joint prep was achieved with drill bit. Compression was achieved with device and provisional fixation with threaded K wire was achieved. Medial and dorsal plates were then placed according to  guidelines. Provisional threaded K wire was then removed. Skin incision was extended and first metatarsal phalangeal joint was then exposed. Arthritic changes are noted to the medial and dorsal aspect of the metatarsophalangeal joint. Bony prominences were resected with a sagittal saw. Base of the proximal phalanx was then exposed and medial based wedge was taken with the use of sagittal saw and fixated with a stable. Good clinical result was achieved with ability to dorsiflex toe with loading of the foot.   Surgical sites were irrigated with sterile saline and soft tissues were closed deep to superficial with Vicryl and nylon suture. 20 mL of Exparel was then placed around the surgical site. Foot was cleaned and dried and dressed with Adaptic, 4 x 4 gauze, Micha and Ace wrap. A posterior splint was then applied. The tourniquet was deflated and normal hyperemic response was noted to all digits. The patient tolerated the procedure and anesthesia well without immediate complications and transferred to PACU with vital signs stable. Dr. Mahnaz Hall was present during the entire procedure and participated in all key aspects. SIGNATURE: Tye Sylvester DPM  DATE: December 12, 2023  TIME: 11:55 AM      Portions of the record may have been created with voice recognition software. Occasional wrong word or "sound a like" substitutions may have occurred due to the inherent limitations of voice recognition software. Read the chart carefully and recognize, using context, where substitutions have occurred.

## 2023-12-12 NOTE — ANESTHESIA POSTPROCEDURE EVALUATION
Post-Op Assessment Note    CV Status:  Stable  Pain Score: 0    Pain management: adequate    Multimodal analgesia used between 6 hours prior to anesthesia start to PACU discharge    Mental Status:  Sleepy   Hydration Status:  Stable   PONV Controlled:  None   Airway Patency:  Patent  Two or more mitigation strategies used for obstructive sleep apnea   Post Op Vitals Reviewed: Yes      Staff: CRNA               BP   106/57   Temp 97   Pulse 75   Resp 16   SpO2 99

## 2023-12-12 NOTE — PHYSICAL THERAPY NOTE
PT evaluation ordered for crutch training NWB however pt already knows how to use crutches so evaluation deferred. Pt fitted for and issued crutches Pt. demonstrated independence with their use NWB R LE. Pt declined practicing stair climbing but this PT reviewed how to negotiate the steps on her buttocks vs using a rail and the crutch NWB R LE. No additional skilled PT needs noted at this time.      Lilli Kelly PT  [phone]

## 2023-12-18 ENCOUNTER — APPOINTMENT (OUTPATIENT)
Dept: RADIOLOGY | Facility: CLINIC | Age: 38
End: 2023-12-18
Payer: COMMERCIAL

## 2023-12-18 ENCOUNTER — OFFICE VISIT (OUTPATIENT)
Age: 38
End: 2023-12-18

## 2023-12-18 VITALS
HEART RATE: 59 BPM | HEIGHT: 62 IN | SYSTOLIC BLOOD PRESSURE: 128 MMHG | WEIGHT: 122 LBS | BODY MASS INDEX: 22.45 KG/M2 | DIASTOLIC BLOOD PRESSURE: 84 MMHG

## 2023-12-18 DIAGNOSIS — M79.671 RIGHT FOOT PAIN: Primary | ICD-10-CM

## 2023-12-18 DIAGNOSIS — M21.611 HALLUX ABDUCTOVALGUS WITH BUNIONS, RIGHT: ICD-10-CM

## 2023-12-18 DIAGNOSIS — M20.11 HALLUX ABDUCTOVALGUS WITH BUNIONS, RIGHT: ICD-10-CM

## 2023-12-18 DIAGNOSIS — Z98.890 POST-OPERATIVE STATE: ICD-10-CM

## 2023-12-18 PROCEDURE — 73630 X-RAY EXAM OF FOOT: CPT

## 2023-12-18 PROCEDURE — 99024 POSTOP FOLLOW-UP VISIT: CPT | Performed by: STUDENT IN AN ORGANIZED HEALTH CARE EDUCATION/TRAINING PROGRAM

## 2023-12-18 NOTE — PROGRESS NOTES
Post-Op Visit    Cherry Apodaca  1985      Subjective: Patient here for post-op appointment following right foot lapidus bunionectomy with akin osteotomy. Patient denies significant pain at the surgical site, active strikethrough drainage, fevers, chills, nightsweats, SOB, chest pain, nor calf pain. The patient is in good spirits. Patient relates compliance with post-op instructions.    Objective: The patient appears in NAD / non-toxic. Primary dressing and splint/cast taken down for wound inspection.VSS. No signs of infection. No active drainage. Normal post-op edema. No necrosis, dehiscence.     Assessment/Plan:  Patient is stable post-op  Discussed compliance with weight bearing instructions, incision care, and rest.   Sutures left intact  DSD reapplied to be kept clean, dry, intact  Continue nonweightbearing with use of crutches for assistance  Postoperative x-ray from 12/18/2023 interpreted independently: Hardware to first TMT J remains intact with adequate bone apposition.  Stable to proximal phalanx remains intact without complication.  Difficult to comment on positional deformity given nonweightbearing nature of the films.    Call if any increase in pain, fevers, calf pain, shortness of breath, or general distress is noted. Patient instructed to go to ER if call is not returned immediately.

## 2023-12-29 ENCOUNTER — OFFICE VISIT (OUTPATIENT)
Age: 38
End: 2023-12-29

## 2023-12-29 VITALS
WEIGHT: 122 LBS | DIASTOLIC BLOOD PRESSURE: 86 MMHG | SYSTOLIC BLOOD PRESSURE: 133 MMHG | BODY MASS INDEX: 22.45 KG/M2 | HEIGHT: 62 IN | HEART RATE: 60 BPM

## 2023-12-29 DIAGNOSIS — M21.611 HALLUX ABDUCTOVALGUS WITH BUNIONS, RIGHT: Primary | ICD-10-CM

## 2023-12-29 DIAGNOSIS — Z98.890 POST-OPERATIVE STATE: ICD-10-CM

## 2023-12-29 DIAGNOSIS — M20.11 HALLUX ABDUCTOVALGUS WITH BUNIONS, RIGHT: Primary | ICD-10-CM

## 2023-12-29 PROCEDURE — 99024 POSTOP FOLLOW-UP VISIT: CPT | Performed by: STUDENT IN AN ORGANIZED HEALTH CARE EDUCATION/TRAINING PROGRAM

## 2023-12-29 NOTE — PROGRESS NOTES
Post-Op Visit    Cherry Apodaca  1985      Subjective: Patient here for post-op appointment following R foot lapidus bunionectomy with Akin osteotomy. Patient denies significant pain at the surgical site, active strikethrough drainage, fevers, chills, nightsweats, SOB, chest pain, nor calf pain. The patient is in good spirits. Patient relates compliance with post-op instructions.    Objective: The patient appears in NAD / non-toxic. Primary dressing and splint/cast taken down for wound inspection.VSS. No signs of infection. No active drainage. Normal post-op edema. No necrosis, dehiscence.     Assessment/Plan:  Patient is stable post-op  Discussed compliance with weight bearing instructions, incision care, and rest.   Sutures removed at bedside, Steri-Strips applied  Patient provided with cam boot, she may be weightbearing as tolerated in cam boot at this time  Patient may begin showering at 3 weeks from the time of surgery  Return to clinic 2 weeks, will take repeat x-rays at this time.    Call if any increase in pain, fevers, calf pain, shortness of breath, or general distress is noted. Patient instructed to go to ER if call is not returned immediately.

## 2024-01-11 ENCOUNTER — APPOINTMENT (OUTPATIENT)
Dept: RADIOLOGY | Facility: CLINIC | Age: 39
End: 2024-01-11
Payer: COMMERCIAL

## 2024-01-11 ENCOUNTER — OFFICE VISIT (OUTPATIENT)
Age: 39
End: 2024-01-11

## 2024-01-11 VITALS
HEART RATE: 80 BPM | WEIGHT: 122 LBS | HEIGHT: 62 IN | DIASTOLIC BLOOD PRESSURE: 85 MMHG | SYSTOLIC BLOOD PRESSURE: 133 MMHG | BODY MASS INDEX: 22.45 KG/M2

## 2024-01-11 DIAGNOSIS — M21.611 HALLUX ABDUCTOVALGUS WITH BUNIONS, RIGHT: ICD-10-CM

## 2024-01-11 DIAGNOSIS — M20.11 HALLUX ABDUCTOVALGUS WITH BUNIONS, RIGHT: Primary | ICD-10-CM

## 2024-01-11 DIAGNOSIS — Z98.890 POST-OPERATIVE STATE: ICD-10-CM

## 2024-01-11 DIAGNOSIS — M21.611 HALLUX ABDUCTOVALGUS WITH BUNIONS, RIGHT: Primary | ICD-10-CM

## 2024-01-11 DIAGNOSIS — M20.11 HALLUX ABDUCTOVALGUS WITH BUNIONS, RIGHT: ICD-10-CM

## 2024-01-11 PROCEDURE — 99024 POSTOP FOLLOW-UP VISIT: CPT | Performed by: STUDENT IN AN ORGANIZED HEALTH CARE EDUCATION/TRAINING PROGRAM

## 2024-01-11 PROCEDURE — 73630 X-RAY EXAM OF FOOT: CPT

## 2024-01-12 NOTE — PROGRESS NOTES
"This patient was seen on 1/11/2024.    My role is Foot , Ankle, and Wound Specialist    ASSESSMENT     Diagnoses and all orders for this visit:    Hallux abductovalgus with bunions, right  -     X-ray foot right 3+ views; Future    Post-operative state         Problem List Items Addressed This Visit          Musculoskeletal and Integument    Hallux abductovalgus with bunions, right - Primary    Relevant Orders    X-ray foot right 3+ views (Completed)       Other    Post-operative state     PLAN  -Cherry and I discussed her right foot  -Continue weightbearing as tolerated in cam boot  -Cherry is to refrain from driving at this time  -Return to clinic in 2 weeks for repeat x-rays, possible discussion on weightbearing with normal sneakers    Right foot x-ray from 1/11/2024 reviewed: Maintained alignment of hallux abductovalgus correction with all hardware intact and in proper place with no radiolucencies surrounding any of the hardware. 1st metatarsal elevatus notes.     SUBJECTIVE    Chief Complaint:  S/p right foot Lapidus bunionectomy, DOS 12/12/2023     Patient ID: Cherry Apodaca     1/11/2024: Cherry is doing well today and states that she has less pain and swelling to her right foot despite being back at work.  She does still endorse minor soreness and pain when she stands for too long.  She is looking forward to getting out of her boot.        The following portions of the patient's history were reviewed and updated as appropriate: allergies, current medications, past family history, past medical history, past social history, past surgical history and problem list.    Review of Systems   Constitutional: Negative.    HENT: Negative.     Respiratory: Negative.     Cardiovascular: Negative.    Gastrointestinal: Negative.    Musculoskeletal:  Positive for arthralgias.   Skin: Negative.    Neurological: Negative.          OBJECTIVE      /85   Pulse 80   Ht 5' 2\" (1.575 m)   Wt 55.3 kg (122 lb)   BMI 22.31 " kg/m²        Physical Exam  Constitutional:       Appearance: Normal appearance.   HENT:      Head: Normocephalic and atraumatic.   Eyes:      General:         Right eye: No discharge.         Left eye: No discharge.   Cardiovascular:      Rate and Rhythm: Normal rate and regular rhythm.      Pulses:           Dorsalis pedis pulses are 2+ on the right side and 2+ on the left side.        Posterior tibial pulses are 2+ on the right side and 2+ on the left side.   Pulmonary:      Effort: Pulmonary effort is normal.      Breath sounds: Normal breath sounds.   Skin:     General: Skin is warm.      Capillary Refill: Capillary refill takes less than 2 seconds.   Neurological:      Sensory: Sensation is intact. No sensory deficit.             MSK:  -Pain on palpation negative  -No gross deformities noted   -MMT is 5/5 to all muscle compartments of the lower extremity  -Ankle dorsiflexion >10 degrees with knee extended and knee flexed b/l  -Decreased ROM noted at the 1st MTPJ    Neuro:  -Light sensation intact bilaterally  -Protective sensation intact bilaterally with 10/10 points felt with [place] Garett monofilament    Derm:  -No lesions, abrasions, or open wounds noted  -No noted interdigital maceration, peeling, malodor  -No callus formation noted on exam

## 2024-01-25 ENCOUNTER — APPOINTMENT (OUTPATIENT)
Dept: RADIOLOGY | Facility: CLINIC | Age: 39
End: 2024-01-25
Payer: COMMERCIAL

## 2024-01-25 ENCOUNTER — OFFICE VISIT (OUTPATIENT)
Age: 39
End: 2024-01-25

## 2024-01-25 VITALS
HEIGHT: 62 IN | BODY MASS INDEX: 22.45 KG/M2 | HEART RATE: 62 BPM | WEIGHT: 122 LBS | SYSTOLIC BLOOD PRESSURE: 131 MMHG | DIASTOLIC BLOOD PRESSURE: 84 MMHG

## 2024-01-25 DIAGNOSIS — M21.611 HALLUX ABDUCTOVALGUS WITH BUNIONS, RIGHT: ICD-10-CM

## 2024-01-25 DIAGNOSIS — M21.611 HALLUX ABDUCTOVALGUS WITH BUNIONS, RIGHT: Primary | ICD-10-CM

## 2024-01-25 DIAGNOSIS — M20.11 HALLUX ABDUCTOVALGUS WITH BUNIONS, RIGHT: Primary | ICD-10-CM

## 2024-01-25 DIAGNOSIS — M20.11 HALLUX ABDUCTOVALGUS WITH BUNIONS, RIGHT: ICD-10-CM

## 2024-01-25 DIAGNOSIS — Z98.890 POST-OPERATIVE STATE: ICD-10-CM

## 2024-01-25 PROCEDURE — 73630 X-RAY EXAM OF FOOT: CPT

## 2024-01-25 PROCEDURE — 99024 POSTOP FOLLOW-UP VISIT: CPT | Performed by: STUDENT IN AN ORGANIZED HEALTH CARE EDUCATION/TRAINING PROGRAM

## 2024-01-26 NOTE — PROGRESS NOTES
"This patient was seen on  1/25/24 .    My role is Foot , Ankle, and Wound Specialist    ASSESSMENT     Diagnoses and all orders for this visit:    Hallux abductovalgus with bunions, right  -     X-ray foot right 3+ views; Future    Post-operative state         Problem List Items Addressed This Visit          Musculoskeletal and Integument    Hallux abductovalgus with bunions, right - Primary    Relevant Orders    X-ray foot right 3+ views (Completed)       Other    Post-operative state     PLAN  -Cherry and I discussed her right foot  -Cherry may begin slowly transitioning to weightbearing with sneakers, she may begin driving as well.  -Return to clinic 4 weeks    X-rays from 1/25/2024 of the right foot interpreted independently: Maintained alignment of hallux abductovalgus correction with all hardware intact and in proper place with no radiolucencies surrounding any of the hardware. 1st metatarsal elevatus notes.     SUBJECTIVE    Chief Complaint:  S/p right foot Lapidus bunionectomy, date of surgery 12/12/2023     Patient ID: Cherry Apodaca     1/25/2024: Cherry is doing well today and states that she has less pain and swelling to her right foot.  She states that she has been consistent with only walking in her boot however she is looking forward to returning to walking in a sneaker.        The following portions of the patient's history were reviewed and updated as appropriate: allergies, current medications, past family history, past medical history, past social history, past surgical history and problem list.    Review of Systems   Constitutional: Negative.    HENT: Negative.     Respiratory: Negative.     Cardiovascular: Negative.    Gastrointestinal: Negative.    Musculoskeletal:  Negative for arthralgias.   Skin: Negative.    Neurological: Negative.          OBJECTIVE      /84   Pulse 62   Ht 5' 2\" (1.575 m) Comment: verbal  Wt 55.3 kg (122 lb) Comment: verbal  BMI 22.31 kg/m²        Physical " Exam  Constitutional:       Appearance: Normal appearance.   HENT:      Head: Normocephalic and atraumatic.   Eyes:      General:         Right eye: No discharge.         Left eye: No discharge.   Cardiovascular:      Rate and Rhythm: Normal rate and regular rhythm.      Pulses:           Dorsalis pedis pulses are 2+ on the right side and 2+ on the left side.        Posterior tibial pulses are 2+ on the right side and 2+ on the left side.   Pulmonary:      Effort: Pulmonary effort is normal.      Breath sounds: Normal breath sounds.   Skin:     General: Skin is warm.      Capillary Refill: Capillary refill takes less than 2 seconds.   Neurological:      Sensory: Sensation is intact. No sensory deficit.         MSK:  -Pain on palpation negative  -No gross deformities noted   -MMT is 5/5 to all muscle compartments of the lower extremity  -Ankle dorsiflexion >10 degrees with knee extended and knee flexed b/l  -Decreased ROM noted at the 1st MTPJ     Neuro:  -Light sensation intact bilaterally  -Protective sensation intact bilaterally with 10/10 points felt with [place] Garett monofilament     Derm:  -No lesions, abrasions, or open wounds noted  -No noted interdigital maceration, peeling, malodor  -No callus formation noted on exam

## 2024-02-22 ENCOUNTER — OFFICE VISIT (OUTPATIENT)
Age: 39
End: 2024-02-22

## 2024-02-22 VITALS
WEIGHT: 122 LBS | DIASTOLIC BLOOD PRESSURE: 81 MMHG | HEIGHT: 62 IN | BODY MASS INDEX: 22.45 KG/M2 | SYSTOLIC BLOOD PRESSURE: 131 MMHG | HEART RATE: 73 BPM

## 2024-02-22 DIAGNOSIS — Z98.890 POST-OPERATIVE STATE: ICD-10-CM

## 2024-02-22 DIAGNOSIS — M20.11 HALLUX ABDUCTOVALGUS WITH BUNIONS, RIGHT: Primary | ICD-10-CM

## 2024-02-22 DIAGNOSIS — M21.611 HALLUX ABDUCTOVALGUS WITH BUNIONS, RIGHT: Primary | ICD-10-CM

## 2024-02-22 PROCEDURE — 99024 POSTOP FOLLOW-UP VISIT: CPT | Performed by: STUDENT IN AN ORGANIZED HEALTH CARE EDUCATION/TRAINING PROGRAM

## 2024-02-22 NOTE — PROGRESS NOTES
This patient was seen on 2/22/2024.    My role is Foot , Ankle, and Wound Specialist    ASSESSMENT     Diagnoses and all orders for this visit:    Hallux abductovalgus with bunions, right    Post-operative state         Problem List Items Addressed This Visit          Musculoskeletal and Integument    Hallux abductovalgus with bunions, right - Primary       Other    Post-operative state     PLAN  -Cherry and I discussed her right foot  -Cherry may stick to her current activity level and nothing more.  Over the past 4 weeks she has been riding her stationary bike including putting weight to her forefoot as well as doing some light yoga activity.  I did let her know that if she wants she may begin walking on her treadmill however no more than 1 mile.  -Return to clinic 4 weeks, we will obtain final postoperative x-rays at this time.    SUBJECTIVE    Chief Complaint:  S/p right foot Lapidus bunionectomy with Meir osteotomy date of surgery 12/12/2023     Patient ID: Cherry Apodaca     2/22/2024: Cherry is doing well today and states that she has less pain and swelling to her right foot.  She does endorse 1 spot of rubbing to the dorsal foot overlying her first TMT J.  She states that she has been riding her stationary bike for exercise including standing pedaling.  She states that she does not do more than 30 minutes on the bike and that there is little to no pain after exercise.  She also states that she has been working on increasing her first MTPJ range of motion which she feels has improved since her last visit.        The following portions of the patient's history were reviewed and updated as appropriate: allergies, current medications, past family history, past medical history, past social history, past surgical history and problem list.    Review of Systems   Constitutional: Negative.    HENT: Negative.     Respiratory: Negative.     Cardiovascular: Negative.    Gastrointestinal: Negative.    Musculoskeletal:   "Negative for arthralgias.   Skin: Negative.    Neurological: Negative.          OBJECTIVE      /81   Pulse 73   Ht 5' 2\" (1.575 m)   Wt 55.3 kg (122 lb)   BMI 22.31 kg/m²        Physical Exam  Constitutional:       Appearance: Normal appearance.   HENT:      Head: Normocephalic and atraumatic.   Eyes:      General:         Right eye: No discharge.         Left eye: No discharge.   Cardiovascular:      Rate and Rhythm: Normal rate and regular rhythm.      Pulses:           Dorsalis pedis pulses are 2+ on the right side and 2+ on the left side.        Posterior tibial pulses are 2+ on the right side and 2+ on the left side.   Pulmonary:      Effort: Pulmonary effort is normal.      Breath sounds: Normal breath sounds.   Skin:     General: Skin is warm.      Capillary Refill: Capillary refill takes less than 2 seconds.   Neurological:      Sensory: Sensation is intact. No sensory deficit.         MSK:  -Pain on palpation noted to the dorsal aspect of the right foot overlying the first TMT J at the proximal aspect of her scar.  -No gross deformities noted   -MMT is 5/5 to all muscle compartments of the lower extremity  -Ankle dorsiflexion >10 degrees with knee extended and knee flexed b/l  -Decreased ROM noted at the 1st MTPJ     Neuro:  -Light sensation intact bilaterally  -Protective sensation intact bilaterally     Derm:  -No lesions, abrasions, or open wounds noted  -No noted interdigital maceration, peeling, malodor  -No callus formation noted on exam  -Mild erythema noted to the dorsal aspect of the right foot overlying the first TMT J.        " 98

## 2024-03-21 ENCOUNTER — APPOINTMENT (OUTPATIENT)
Dept: RADIOLOGY | Facility: CLINIC | Age: 39
End: 2024-03-21
Payer: COMMERCIAL

## 2024-03-21 ENCOUNTER — OFFICE VISIT (OUTPATIENT)
Age: 39
End: 2024-03-21

## 2024-03-21 VITALS
HEART RATE: 64 BPM | BODY MASS INDEX: 22.45 KG/M2 | DIASTOLIC BLOOD PRESSURE: 83 MMHG | SYSTOLIC BLOOD PRESSURE: 121 MMHG | HEIGHT: 62 IN | WEIGHT: 122 LBS

## 2024-03-21 DIAGNOSIS — M20.11 HALLUX ABDUCTOVALGUS WITH BUNIONS, RIGHT: Primary | ICD-10-CM

## 2024-03-21 DIAGNOSIS — Z98.890 POST-OPERATIVE STATE: ICD-10-CM

## 2024-03-21 DIAGNOSIS — M21.611 HALLUX ABDUCTOVALGUS WITH BUNIONS, RIGHT: Primary | ICD-10-CM

## 2024-03-21 PROCEDURE — 99024 POSTOP FOLLOW-UP VISIT: CPT | Performed by: STUDENT IN AN ORGANIZED HEALTH CARE EDUCATION/TRAINING PROGRAM

## 2024-03-21 PROCEDURE — 73630 X-RAY EXAM OF FOOT: CPT

## 2024-03-23 NOTE — PROGRESS NOTES
"This patient was seen on  3/21/24 .    My role is Foot , Ankle, and Wound Specialist    ASSESSMENT     Diagnoses and all orders for this visit:    Hallux abductovalgus with bunions, right    Post-operative state  -     XR foot 3+ vw right; Future         Problem List Items Addressed This Visit          Musculoskeletal and Integument    Hallux abductovalgus with bunions, right - Primary       Other    Post-operative state    Relevant Orders    XR foot 3+ vw right     PLAN  -Cherry is doing well postoperatively  -Continue weightbearing as tolerated to bilateral feet  -Cherry may increase her level of exercise to include walking and jogging for exercise to the right foot  -Return to clinic 8 weeks    X-ray from 3/21/2024 interpreted independently: No acute osseous abnormality noted.  Retained adequate correction of IM and hallux abductus angle.  No definitive osseous fusion of the first TMT J noted.    SUBJECTIVE    Chief Complaint:  S/p right foot Lapidus bunionectomy with Meir osteotomy date of surgery 12/12/2023      Patient ID: Cherry Apodaca     3/21/2024: Cherry is doing well today and states that she has less pain and swelling to her right foot.  She does endorse 1 spot of rubbing to the dorsal foot overlying her first TMT J.  She states that overall she is pain-free.  She is ready to get back to her normal level of preoperation exercise.        The following portions of the patient's history were reviewed and updated as appropriate: allergies, current medications, past family history, past medical history, past social history, past surgical history and problem list.    Review of Systems   Constitutional: Negative.    HENT: Negative.     Respiratory: Negative.     Cardiovascular: Negative.    Gastrointestinal: Negative.    Musculoskeletal:  Negative for arthralgias.   Skin: Negative.    Neurological: Negative.          OBJECTIVE      /83   Pulse 64   Ht 5' 2\" (1.575 m)   Wt 55.3 kg (122 lb)   BMI 22.31 " kg/m²        Physical Exam  Constitutional:       Appearance: Normal appearance.   HENT:      Head: Normocephalic and atraumatic.   Eyes:      General:         Right eye: No discharge.         Left eye: No discharge.   Cardiovascular:      Rate and Rhythm: Normal rate and regular rhythm.      Pulses:           Dorsalis pedis pulses are 2+ on the right side and 2+ on the left side.        Posterior tibial pulses are 2+ on the right side and 2+ on the left side.   Pulmonary:      Effort: Pulmonary effort is normal.      Breath sounds: Normal breath sounds.   Skin:     General: Skin is warm.      Capillary Refill: Capillary refill takes less than 2 seconds.   Neurological:      Sensory: Sensation is intact. No sensory deficit.         MSK:  -Pain on palpation noted to the dorsal aspect of the right foot overlying the first TMT J at the proximal aspect of her scar.  -No gross deformities noted   -MMT is 5/5 to all muscle compartments of the lower extremity  -Ankle dorsiflexion >10 degrees with knee extended and knee flexed b/l  -Decreased ROM noted at the 1st MTPJ     Neuro:  -Light sensation intact bilaterally  -Protective sensation intact bilaterally     Derm:  -No lesions, abrasions, or open wounds noted  -No noted interdigital maceration, peeling, malodor  -No callus formation noted on exam  -Mild erythema noted to the dorsal aspect of the right foot overlying the first TMT J.

## 2024-04-23 NOTE — PATIENT INSTRUCTIONS
Purchase a supportive over-the-counter pair of inserts such as Superfeet, powersteps, tread labs    
Refer to the Assessment tab to view/cancel completed assessment.

## 2024-05-16 ENCOUNTER — OFFICE VISIT (OUTPATIENT)
Age: 39
End: 2024-05-16
Payer: COMMERCIAL

## 2024-05-16 VITALS
HEIGHT: 62 IN | DIASTOLIC BLOOD PRESSURE: 78 MMHG | SYSTOLIC BLOOD PRESSURE: 123 MMHG | WEIGHT: 122 LBS | HEART RATE: 61 BPM | BODY MASS INDEX: 22.45 KG/M2

## 2024-05-16 DIAGNOSIS — M21.611 HALLUX ABDUCTOVALGUS WITH BUNIONS, RIGHT: Primary | ICD-10-CM

## 2024-05-16 DIAGNOSIS — Z98.890 POST-OPERATIVE STATE: ICD-10-CM

## 2024-05-16 DIAGNOSIS — M20.11 HALLUX ABDUCTOVALGUS WITH BUNIONS, RIGHT: Primary | ICD-10-CM

## 2024-05-16 PROCEDURE — 99212 OFFICE O/P EST SF 10 MIN: CPT | Performed by: STUDENT IN AN ORGANIZED HEALTH CARE EDUCATION/TRAINING PROGRAM

## 2024-05-16 NOTE — PROGRESS NOTES
"This patient was seen on  5/16/24 .    My role is Foot , Ankle, and Wound Specialist    ASSESSMENT     Diagnoses and all orders for this visit:    Hallux abductovalgus with bunions, right    Post-operative state           Problem List Items Addressed This Visit          Musculoskeletal and Integument    Hallux abductovalgus with bunions, right - Primary       Other    Post-operative state       PLAN  -Cherry is doing well postoperatively  -Continue weightbearing as tolerated to bilateral feet  -Continue activity as tolerated  -We briefly discussed the need for possible hardware removal if this continues to cause irritation tomorrow his right dorsal foot  -Return to clinic 3 months, we will obtain repeat x-rays at this time    SUBJECTIVE    Chief Complaint:  S/p right foot Lapidus bunionectomy with Meir osteotomy date of surgery 12/12/2023      Patient ID: Cherry Apodaca     5/16/2024: Cherry is doing well today and states that she has less pain and swelling to her right foot.  The dorsal aspect of her plate does continue to on certain pairs of shoes and is slightly red today.  Cherry also states that there is some numbness and tingling to the foot especially after exercise.        The following portions of the patient's history were reviewed and updated as appropriate: allergies, current medications, past family history, past medical history, past social history, past surgical history and problem list.    Review of Systems   Constitutional: Negative.    HENT: Negative.     Respiratory: Negative.     Cardiovascular: Negative.    Gastrointestinal: Negative.    Musculoskeletal:  Negative for arthralgias.   Skin: Negative.    Neurological: Negative.          OBJECTIVE      /78   Pulse 61   Ht 5' 2\" (1.575 m)   Wt 55.3 kg (122 lb)   BMI 22.31 kg/m²        Physical Exam  Constitutional:       Appearance: Normal appearance.   HENT:      Head: Normocephalic and atraumatic.   Eyes:      General:         Right eye: No " discharge.         Left eye: No discharge.   Cardiovascular:      Rate and Rhythm: Normal rate and regular rhythm.      Pulses:           Dorsalis pedis pulses are 2+ on the right side and 2+ on the left side.        Posterior tibial pulses are 2+ on the right side and 2+ on the left side.   Pulmonary:      Effort: Pulmonary effort is normal.      Breath sounds: Normal breath sounds.   Skin:     General: Skin is warm.      Capillary Refill: Capillary refill takes less than 2 seconds.   Neurological:      Sensory: Sensation is intact. No sensory deficit.         MSK:  -Pain on palpation noted to the dorsal aspect of the right foot overlying the first TMT J at the proximal aspect of her scar.  -No gross deformities noted   -MMT is 5/5 to all muscle compartments of the lower extremity  -Ankle dorsiflexion >10 degrees with knee extended and knee flexed b/l  -Decreased ROM noted at the 1st MTPJ, this is improved compared to the patient's previous visit     Neuro:  -Light sensation intact bilaterally  -Protective sensation intact bilaterally  -Positive Tinel's sign to medial dorsal cutaneous nerve overlying dorsal plate     Derm:  -No lesions, abrasions, or open wounds noted  -No noted interdigital maceration, peeling, malodor  -No callus formation noted on exam  -Mild erythema noted to the dorsal aspect of the right foot overlying the first TMTJ.

## 2024-05-28 ENCOUNTER — RA CDI HCC (OUTPATIENT)
Dept: OTHER | Facility: HOSPITAL | Age: 39
End: 2024-05-28

## 2024-05-28 NOTE — PROGRESS NOTES
HCC coding opportunities       Chart reviewed, no opportunity found: CHART REVIEWED, NO OPPORTUNITY FOUND        Patients Insurance        Commercial Insurance: [employer]

## 2024-07-09 ENCOUNTER — OFFICE VISIT (OUTPATIENT)
Dept: FAMILY MEDICINE CLINIC | Facility: CLINIC | Age: 39
End: 2024-07-09
Payer: COMMERCIAL

## 2024-07-09 VITALS
HEIGHT: 62 IN | DIASTOLIC BLOOD PRESSURE: 70 MMHG | WEIGHT: 127 LBS | SYSTOLIC BLOOD PRESSURE: 110 MMHG | OXYGEN SATURATION: 100 % | BODY MASS INDEX: 23.37 KG/M2 | HEART RATE: 67 BPM

## 2024-07-09 DIAGNOSIS — Z13.1 SCREENING FOR DIABETES MELLITUS: ICD-10-CM

## 2024-07-09 DIAGNOSIS — D22.9 MULTIPLE ATYPICAL NEVI: ICD-10-CM

## 2024-07-09 DIAGNOSIS — Z12.31 SCREENING MAMMOGRAM FOR BREAST CANCER: ICD-10-CM

## 2024-07-09 DIAGNOSIS — Z84.81 FAMILY HISTORY OF BRCA GENE POSITIVE: ICD-10-CM

## 2024-07-09 DIAGNOSIS — Z80.8 FAMILY HISTORY OF MELANOMA: ICD-10-CM

## 2024-07-09 DIAGNOSIS — Z13.29 SCREENING FOR THYROID DISORDER: ICD-10-CM
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
## 2024-07-09 DIAGNOSIS — Z13.220 SCREENING CHOLESTEROL LEVEL: ICD-10-CM

## 2024-07-09 DIAGNOSIS — Z80.3 FAMILY HISTORY OF BREAST CANCER: ICD-10-CM

## 2024-07-09 DIAGNOSIS — Z00.00 ENCOUNTER FOR PREVENTATIVE ADULT HEALTH CARE EXAMINATION: Primary | ICD-10-CM

## 2024-07-09 DIAGNOSIS — Z13.0 SCREENING FOR DEFICIENCY ANEMIA: ICD-10-CM

## 2024-07-09 PROCEDURE — 99396 PREV VISIT EST AGE 40-64: CPT | Performed by: FAMILY MEDICINE

## 2024-07-09 RX ORDER — FEXOFENADINE HCL 180 MG/1
180 TABLET ORAL DAILY
COMMUNITY

## 2024-07-09 NOTE — PATIENT INSTRUCTIONS
Please obtain labs fasting.  No food or drink for 8-12 hours before except for water.  I recommend a bottle about 1 hour before.     Please obtain Debrox to help with ear wax.  If needed I can flush.  Primarily on right ear.       Adult Patient Counseling Points     Hydroxyzine   What is this drug used for?   It is used to treat itching.  It is used to treat anxiety.  It is used to put you to sleep for surgery.  It is used to treat mood problems.  It is used to treat upset stomach and throwing up.  It may be given to you for other reasons. Talk with the doctor.  All drugs may cause side effects. However, many people have no side effects or only have minor side effects. Call your doctor or get medical help if any of these side effects or any other side effects bother you or do not go away:   Dry mouth  Fatigue  WARNING/CAUTION: Even though it may be rare, some people may have very bad and sometimes deadly side effects when taking a drug. Tell your doctor or get medical help right away if you have any of the following signs or symptoms that may be related to a very bad side effect:   Abnormal heartbeat  Fast heartbeat  Severe dizziness  Passing out  Abnormal movements  Pinpoint red spots on skin  Severe skin irritation  Injection site burning  Skin discoloration, pain, swelling, or irritation  Confusion  Signs of an allergic reaction, like rash; hives; itching; red, swollen, blistered, or peeling skin with or without fever; wheezing; tightness in the chest or throat; trouble breathing, swallowing, or talking; unusual hoarseness; or swelling of the mouth, face, lips, tongue, or throat.  Note: This is not a comprehensive list of all side effects. Talk to your doctor if you have questions.  Consumer Information Use and Disclaimer: This information should not be used to decide whether or not to take this medicine or any other medicine. Only the healthcare provider has the knowledge and training to decide which medicines  are right for a specific patient. This information does not endorse any medicine as safe, effective, or approved for treating any patient or health condition. This is only a limited summary of general information about the medicine’s uses from the patient education leaflet and is not intended to be comprehensive. This limited summary does NOT include all information available about the possible uses, directions, warnings, precautions, interactions, adverse effects, or risks that may apply to this medicine. This information is not intended to provide medical advice, diagnosis or treatment and does not replace information you receive from the healthcare provider. For a more detailed summary of information about the risks and benefits of using this medicine, please speak with your healthcare provider and review the entire patient education leaflet.  Copyright   © 2024 [employer] and its affiliates and/or licensors. All rights reserved.  Last Reviewed Date   2023-05-31            Patient Education     Buspirone (dana ANNA holley)   Brand Names: [place] APO-BusPIRone; AURO-Buspirone; CO BusPIRone; GMD-Buspirione; JAMP-Buspirone; MINT-Buspirone; PMS-BusPIRone; TEVA-BusPIRone   What is this drug used for?   It is used to treat anxiety.  It may be given to you for other reasons. Talk with the doctor.  What do I need to tell my doctor BEFORE I take this drug?   If you are allergic to this drug; any part of this drug; or any other drugs, foods, or substances. Tell your doctor about the allergy and what signs you had.  If you have taken certain drugs for depression or Parkinson's disease in the last 14 days. This includes isocarboxazid, phenelzine, tranylcypromine, selegiline, or rasagiline. Very high blood pressure may happen.  If you are taking any of these drugs: Linezolid or methylene blue.  If you are taking tryptophan.  If you have any of these health problems: Kidney disease or liver disease.  This is not a list of all  drugs or health problems that interact with this drug.  Tell your doctor and pharmacist about all of your drugs (prescription or OTC, natural products, vitamins) and health problems. You must check to make sure that it is safe for you to take this drug with all of your drugs and health problems. Do not start, stop, or change the dose of any drug without checking with your doctor.  What are some things I need to know or do while I take this drug?   Tell all of your health care providers that you take this drug. This includes your doctors, nurses, pharmacists, and dentists.  Avoid driving and doing other tasks or actions that call for you to be alert until you see how this drug affects you.  Avoid drinking alcohol while taking this drug.  Talk with your doctor before you use marijuana, other forms of cannabis, or prescription or OTC drugs that may slow your actions.  If you drink grapefruit juice or eat grapefruit often, talk with your doctor.  This drug may affect certain lab tests. Tell all of your health care providers and lab workers that you take this drug.  Tell your doctor if you are pregnant, plan on getting pregnant, or are breast-feeding. You will need to talk about the benefits and risks to you and the baby.  What are some side effects that I need to call my doctor about right away?   WARNING/CAUTION: Even though it may be rare, some people may have very bad and sometimes deadly side effects when taking a drug. Tell your doctor or get medical help right away if you have any of the following signs or symptoms that may be related to a very bad side effect:  Signs of an allergic reaction, like rash; hives; itching; red, swollen, blistered, or peeling skin with or without fever; wheezing; tightness in the chest or throat; trouble breathing, swallowing, or talking; unusual hoarseness; or swelling of the mouth, face, lips, tongue, or throat.  Restlessness.  Trouble controlling body movements, twitching, change in  balance, trouble swallowing or speaking.  A severe and sometimes deadly problem called serotonin syndrome may happen. The risk may be greater if you also take certain other drugs. Call your doctor right away if you have agitation; change in balance; confusion; hallucinations; fever; fast or abnormal heartbeat; flushing; muscle twitching or stiffness; seizures; shivering or shaking; sweating a lot; severe diarrhea, upset stomach, or throwing up; or very bad headache.  What are some other side effects of this drug?   All drugs may cause side effects. However, many people have no side effects or only have minor side effects. Call your doctor or get medical help if any of these side effects or any other side effects bother you or do not go away:  Feeling dizzy or sleepy.  Feeling nervous and excitable.  Headache.  Upset stomach.  These are not all of the side effects that may occur. If you have questions about side effects, call your doctor. Call your doctor for medical advice about side effects.  You may report side effects to your national health agency.  You may report side effects to the FDA at 1-575.718.4751. You may also report side effects at https://www.fda.gov/medwatch.  How is this drug best taken?   Use this drug as ordered by your doctor. Read all information given to you. Follow all instructions closely.  Take with or without food but take the same way each time. Always take with food or always take on an empty stomach.  Keep taking this drug as you have been told by your doctor or other health care provider, even if you feel well.  Some tablets may have a score line. If needed, these tablets may be split on the score line.  What do I do if I miss a dose?   Take a missed dose as soon as you think about it.  If it is close to the time for your next dose, skip the missed dose and go back to your normal time.  Do not take 2 doses at the same time or extra doses.  How do I store and/or throw out this drug?    Store at room temperature in a dry place. Do not store in a bathroom.  Keep all drugs in a safe place. Keep all drugs out of the reach of children and pets.  Throw away unused or  drugs. Do not flush down a toilet or pour down a drain unless you are told to do so. Check with your pharmacist if you have questions about the best way to throw out drugs. There may be drug take-back programs in your area.  General drug facts   If your symptoms or health problems do not get better or if they become worse, call your doctor.  Do not share your drugs with others and do not take anyone else's drugs.  Some drugs may have another patient information leaflet. If you have any questions about this drug, please talk with your doctor, nurse, pharmacist, or other health care provider.  Some drugs may have another patient information leaflet. Check with your pharmacist. If you have any questions about this drug, please talk with your doctor, nurse, pharmacist, or other health care provider.  If you think there has been an overdose, call your poison control center or get medical care right away. Be ready to tell or show what was taken, how much, and when it happened.  Consumer Information Use and Disclaimer   This generalized information is a limited summary of diagnosis, treatment, and/or medication information. It is not meant to be comprehensive and should be used as a tool to help the user understand and/or assess potential diagnostic and treatment options. It does NOT include all information about conditions, treatments, medications, side effects, or risks that may apply to a specific patient. It is not intended to be medical advice or a substitute for the medical advice, diagnosis, or treatment of a health care provider based on the health care provider's examination and assessment of a patient's specific and unique circumstances. Patients must speak with a health care provider for complete information about their  health, medical questions, and treatment options, including any risks or benefits regarding use of medications. This information does not endorse any treatments or medications as safe, effective, or approved for treating a specific patient. UpToDate, Inc. and its affiliates disclaim any warranty or liability relating to this information or the use thereof. The use of this information is governed by the Terms of Use, available at https://www.woltersMailMaguwer.com/en/know/clinical-effectiveness-terms.  Last Reviewed Date   2023-05-18  Copyright   © 2024 UpToDate, Inc. and its affiliates and/or licensors. All rights reserved.

## 2024-07-09 NOTE — PROGRESS NOTES
Adult Annual Physical  Name: Cherry Apodaca      : 1985      MRN: [id number]  Encounter Provider: John Bruno DO  Encounter Date: 2024   Encounter department: [facility]    Assessment & Plan   Encounter for preventative adult health care examination  Screening for thyroid disorder  Screening for diabetes mellitus  Screening cholesterol level  Screening for deficiency anemia  Patient presents for annual physical.  She has not obtained labs in some time and is interested in follow-up of her kidney function, liver function, electrolytes, fasting glucose, cell lines, cholesterol, and thyroid.  -     TSH, 3rd generation with Free T4 reflex; Future; Expected date: 2024  -     Comprehensive metabolic panel; Future; Expected date: 2024  -     CBC and differential; Future; Expected date: 2024  -     Lipid panel; Future; Expected date: 2024    Screening mammogram for breast cancer  Family history of breast cancer  Family history of BRCA gene positive  Patient requires a screening mammogram for breast cancer.  She has a strong family history with a maternal grandmother with breast cancer and a maternal aunt with BRCA 1 positive breast cancer.  -     Mammo screening bilateral w 3d & cad; Future    Multiple atypical nevi  Family history of melanoma  Moderate sun exposure, sequela  On physical examination, the patient has tanned skin but has a family history of melanoma.  She has several dark lesions that are being monitored by the patient, but not significantly abnormal.  Moderate sun exposure over the course of her life.  I recommended she follow-up with dermatology for regular screening.  -     Ambulatory Referral to Dermatology; Future    Discussed with the patient her concern about sleep.  I have given her information on her after visit summary to follow-up with me if interested in considering BuSpar or hydroxyzine to help with racing thoughts at night when  she wakes up.  She will need to follow-up with me sooner if she decides to go with medication management for sleep.    Immunizations and preventive care screenings were discussed with patient today. Appropriate education was printed on patient's after visit summary.    Counseling:  Alcohol/drug use: discussed moderation in alcohol intake, the recommendations for healthy alcohol use, and avoidance of illicit drug use.  Dental Health: discussed importance of regular tooth brushing, flossing, and dental visits.  Sexual health: discussed sexually transmitted diseases, partner selection, use of condoms, avoidance of unintended pregnancy, and contraceptive alternatives.  Exercise: the importance of regular exercise/physical activity was discussed. Recommend exercise 3-5 times per week for at least 30 minutes.       Depression Screening and Follow-up Plan: Patient was screened for depression during today's encounter. They screened negative with a PHQ-2 score of 0.        History of Present Illness     Adult Annual Physical:  Patient presents for annual physical. Presents for her annual physical.  She feels that she is overall doing well after recovering from a bunionectomy/multiple podiatry surgeries.  She did feel mildly depressed during the time when she was off her feet since she is a very active individual, but now with surgery being completed she has been running and lifting more which has improved her mood.  She does admit to having numbness and tingling in the right foot associated with the plate that was placed.  This is pressing up against the nerve and causing some numbness.  Despite this she has been able to exercise and has no significant issues with the numbness and tingling outside of the altered sensation.    She admits to mild changes with sleep.  She will wake up at 1-2 in the morning and be unable to go back to sleep without significant grogginess the next day.  She has attempted melatonin at this time  and again she feels out of it were in a brain fog.  We discussed briefly different options to help with sleep, but she is not convinced that she requires any medication at this time. .     Diet and Physical Activity:  - Diet/Nutrition: well balanced diet and portion control.  - Exercise: 5-7 times a week on average and 1-2 hours on average.    Depression Screening:  - PHQ-2 Score: 0    General Health:  - Sleep:. Patient having difficulty with sleeping especially when she wakes up around 1-2 am.  She has no trouble getting to sleep sometimes when she wakes up she is unable to get back to sleep.  - Hearing:. clogged feeling  - Vision: wears contacts.  - Dental: regular dental visits.    /GYN Health:  - Follows with GYN: yes.   - Menopause: perimenopausal.   - History of STDs: no    Review of Systems   Constitutional:  Negative for chills, fever and unexpected weight change.   HENT:  Positive for ear pain (pressure fulliness) and hearing loss (pressure muffled). Negative for congestion, ear discharge, postnasal drip, rhinorrhea, sinus pressure, sinus pain and sore throat.    Eyes:  Positive for visual disturbance (wears contacts).   Respiratory:  Negative for cough, chest tightness and shortness of breath.    Cardiovascular:  Negative for chest pain and palpitations.   Gastrointestinal:  Negative for abdominal pain, blood in stool, constipation, diarrhea, nausea and vomiting.   Musculoskeletal:  Positive for arthralgias (foot on right, plate causing numbness and tingling.).   Skin:  Negative for rash and wound.   Allergic/Immunologic: Positive for environmental allergies.   Neurological:  Negative for dizziness, light-headedness and headaches.   Psychiatric/Behavioral:  Negative for self-injury and suicidal ideas.      Medical History Reviewed by provider this encounter:  Tobacco  Allergies  Meds  Med Hx  Soc Hx      Current Outpatient Medications on File Prior to Visit   Medication Sig Dispense Refill     "albuterol (PROVENTIL HFA,VENTOLIN HFA) 90 mcg/act inhaler Inhale 2 puffs every 6 (six) hours as needed for wheezing      fexofenadine (ALLEGRA) 180 MG tablet Take 180 mg by mouth daily      ibuprofen (MOTRIN) 600 mg tablet Take 1 tablet (600 mg total) by mouth every 6 (six) hours as needed for mild pain 30 tablet 0    [DISCONTINUED] cetirizine (ZyrTEC) 10 MG chewable tablet Chew 10 mg daily       No current facility-administered medications on file prior to visit.      Social History     Tobacco Use    Smoking status: Never     Passive exposure: Never    Smokeless tobacco: Never   Vaping Use    Vaping status: Never Used   Substance and Sexual Activity    Alcohol use: Yes     Alcohol/week: 3.0 standard drinks of alcohol     Types: 3 Glasses of wine per week     Comment: occasional    Drug use: No    Sexual activity: Yes     Partners: Male     Birth control/protection: Male Sterilization       Objective     /70   Pulse 67   Ht 5' 2\" (1.575 m)   Wt 57.6 kg (127 lb)   SpO2 100%   BMI 23.23 kg/m²     Physical Exam  Constitutional:       General: She is not in acute distress.     Appearance: Normal appearance. She is normal weight. She is not ill-appearing, toxic-appearing or diaphoretic.      Comments: Muscular build   HENT:      Head: Normocephalic and atraumatic.      Right Ear: Tympanic membrane, ear canal and external ear normal. There is no impacted cerumen.      Left Ear: Tympanic membrane, ear canal and external ear normal. There is no impacted cerumen.      Nose: Nose normal. No congestion or rhinorrhea.      Mouth/Throat:      Mouth: Mucous membranes are moist.      Pharynx: Oropharynx is clear. No oropharyngeal exudate or posterior oropharyngeal erythema.   Eyes:      General:         Right eye: No discharge.         Left eye: No discharge.      Extraocular Movements: Extraocular movements intact.      Pupils: Pupils are equal, round, and reactive to light.   Cardiovascular:      Rate and Rhythm: " Normal rate and regular rhythm.      Heart sounds: Normal heart sounds. No murmur heard.     No friction rub. No gallop.   Pulmonary:      Effort: Pulmonary effort is normal. No respiratory distress.      Breath sounds: Normal breath sounds. No stridor. No wheezing, rhonchi or rales.   Abdominal:      General: Bowel sounds are normal. There is no distension.      Palpations: Abdomen is soft.      Tenderness: There is no abdominal tenderness.   Musculoskeletal:      Cervical back: Neck supple. No tenderness.   Lymphadenopathy:      Cervical: No cervical adenopathy.   Skin:     Capillary Refill: Capillary refill takes less than 2 seconds.   Neurological:      Mental Status: She is alert.      Sensory: No sensory deficit (Light touch present in all 4 extremities.).      Motor: No weakness (5/5 in all 4 extremities).      Deep Tendon Reflexes: Reflexes abnormal (Mildly decreased DTRs, 1/2, C5, C6, L4, S1).

## 2024-07-12 ENCOUNTER — APPOINTMENT (OUTPATIENT)
Dept: LAB | Facility: CLINIC | Age: 39
End: 2024-07-12
Payer: COMMERCIAL

## 2024-07-12 DIAGNOSIS — Z00.00 ENCOUNTER FOR PREVENTATIVE ADULT HEALTH CARE EXAMINATION: ICD-10-CM

## 2024-07-12 DIAGNOSIS — Z13.1 SCREENING FOR DIABETES MELLITUS: ICD-10-CM

## 2024-07-12 DIAGNOSIS — Z13.0 SCREENING FOR DEFICIENCY ANEMIA: ICD-10-CM

## 2024-07-12 DIAGNOSIS — Z13.29 SCREENING FOR THYROID DISORDER: ICD-10-CM

## 2024-07-12 DIAGNOSIS — Z13.220 SCREENING CHOLESTEROL LEVEL: ICD-10-CM

## 2024-07-12 LAB
ALBUMIN SERPL BCG-MCNC: 4.2 G/DL (ref 3.5–5)
ALP SERPL-CCNC: 59 U/L (ref 34–104)
ALT SERPL W P-5'-P-CCNC: 14 U/L (ref 7–52)
ANION GAP SERPL CALCULATED.3IONS-SCNC: 11 MMOL/L (ref 4–13)
AST SERPL W P-5'-P-CCNC: 21 U/L (ref 13–39)
BASOPHILS # BLD AUTO: 0.05 THOUSANDS/ÂΜL (ref 0–0.1)
BASOPHILS NFR BLD AUTO: 1 % (ref 0–1)
BILIRUB SERPL-MCNC: 0.54 MG/DL (ref 0.2–1)
BUN SERPL-MCNC: 13 MG/DL (ref 5–25)
CALCIUM SERPL-MCNC: 9.1 MG/DL (ref 8.4–10.2)
CHLORIDE SERPL-SCNC: 101 MMOL/L (ref 96–108)
CHOLEST SERPL-MCNC: 186 MG/DL
CO2 SERPL-SCNC: 25 MMOL/L (ref 21–32)
CREAT SERPL-MCNC: 0.94 MG/DL (ref 0.6–1.3)
EOSINOPHIL # BLD AUTO: 0.15 THOUSAND/ÂΜL (ref 0–0.61)
EOSINOPHIL NFR BLD AUTO: 2 % (ref 0–6)
ERYTHROCYTE [DISTWIDTH] IN BLOOD BY AUTOMATED COUNT: 12.2 % (ref 11.6–15.1)
GFR SERPL CREATININE-BSD FRML MDRD: 76 ML/MIN/1.73SQ M
GLUCOSE P FAST SERPL-MCNC: 87 MG/DL (ref 65–99)
HCT VFR BLD AUTO: 42.9 % (ref 34.8–46.1)
HDLC SERPL-MCNC: 78 MG/DL
HGB BLD-MCNC: 13.8 G/DL (ref 11.5–15.4)
IMM GRANULOCYTES # BLD AUTO: 0.02 THOUSAND/UL (ref 0–0.2)
IMM GRANULOCYTES NFR BLD AUTO: 0 % (ref 0–2)
LDLC SERPL CALC-MCNC: 88 MG/DL (ref 0–100)
LYMPHOCYTES # BLD AUTO: 1.92 THOUSANDS/ÂΜL (ref 0.6–4.47)
LYMPHOCYTES NFR BLD AUTO: 26 % (ref 14–44)
MCH RBC QN AUTO: 30 PG (ref 26.8–34.3)
MCHC RBC AUTO-ENTMCNC: 32.2 G/DL (ref 31.4–37.4)
MCV RBC AUTO: 93 FL (ref 82–98)
MONOCYTES # BLD AUTO: 0.42 THOUSAND/ÂΜL (ref 0.17–1.22)
MONOCYTES NFR BLD AUTO: 6 % (ref 4–12)
NEUTROPHILS # BLD AUTO: 4.76 THOUSANDS/ÂΜL (ref 1.85–7.62)
NEUTS SEG NFR BLD AUTO: 65 % (ref 43–75)
NONHDLC SERPL-MCNC: 108 MG/DL
NRBC BLD AUTO-RTO: 0 /100 WBCS
PLATELET # BLD AUTO: 329 THOUSANDS/UL (ref 149–390)
PMV BLD AUTO: 10.5 FL (ref 8.9–12.7)
POTASSIUM SERPL-SCNC: 4.1 MMOL/L (ref 3.5–5.3)
PROT SERPL-MCNC: 7.5 G/DL (ref 6.4–8.4)
RBC # BLD AUTO: 4.6 MILLION/UL (ref 3.81–5.12)
SODIUM SERPL-SCNC: 137 MMOL/L (ref 135–147)
TRIGL SERPL-MCNC: 98 MG/DL
TSH SERPL DL<=0.05 MIU/L-ACNC: 1.4 UIU/ML (ref 0.45–4.5)
WBC # BLD AUTO: 7.32 THOUSAND/UL (ref 4.31–10.16)

## 2024-07-12 PROCEDURE — 80053 COMPREHEN METABOLIC PANEL: CPT

## 2024-07-12 PROCEDURE — 85025 COMPLETE CBC W/AUTO DIFF WBC: CPT

## 2024-07-12 PROCEDURE — 36415 COLL VENOUS BLD VENIPUNCTURE: CPT

## 2024-07-12 PROCEDURE — 80061 LIPID PANEL: CPT

## 2024-07-12 PROCEDURE — 84443 ASSAY THYROID STIM HORMONE: CPT

## 2024-08-12 ENCOUNTER — OFFICE VISIT (OUTPATIENT)
Age: 39
End: 2024-08-12
Payer: COMMERCIAL

## 2024-08-12 ENCOUNTER — APPOINTMENT (OUTPATIENT)
Dept: RADIOLOGY | Facility: CLINIC | Age: 39
End: 2024-08-12
Payer: COMMERCIAL

## 2024-08-12 VITALS
HEART RATE: 64 BPM | DIASTOLIC BLOOD PRESSURE: 74 MMHG | BODY MASS INDEX: 23.37 KG/M2 | WEIGHT: 127 LBS | SYSTOLIC BLOOD PRESSURE: 118 MMHG | HEIGHT: 62 IN

## 2024-08-12 DIAGNOSIS — M20.11 HALLUX ABDUCTOVALGUS WITH BUNIONS, RIGHT: Primary | ICD-10-CM

## 2024-08-12 DIAGNOSIS — Z98.890 POST-OPERATIVE STATE: ICD-10-CM

## 2024-08-12 DIAGNOSIS — M21.611 HALLUX ABDUCTOVALGUS WITH BUNIONS, RIGHT: ICD-10-CM

## 2024-08-12 DIAGNOSIS — M21.611 HALLUX ABDUCTOVALGUS WITH BUNIONS, RIGHT: Primary | ICD-10-CM

## 2024-08-12 DIAGNOSIS — M20.11 HALLUX ABDUCTOVALGUS WITH BUNIONS, RIGHT: ICD-10-CM

## 2024-08-12 PROCEDURE — 73630 X-RAY EXAM OF FOOT: CPT

## 2024-08-12 PROCEDURE — 99212 OFFICE O/P EST SF 10 MIN: CPT | Performed by: STUDENT IN AN ORGANIZED HEALTH CARE EDUCATION/TRAINING PROGRAM

## 2024-08-17 NOTE — PROGRESS NOTES
This patient was seen on  8/12/24 .    My role is Foot , Ankle, and Wound Specialist    ASSESSMENT     Diagnoses and all orders for this visit:    Hallux abductovalgus with bunions, right  -     X-ray foot right 3+ views; Future    Post-operative state  -     X-ray foot right 3+ views; Future           Problem List Items Addressed This Visit          Musculoskeletal and Integument    Hallux abductovalgus with bunions, right - Primary    Relevant Orders    X-ray foot right 3+ views (Completed)       Other    Post-operative state    Relevant Orders    X-ray foot right 3+ views (Completed)       PLAN  -Cherry is doing well postoperatively  -Continue weightbearing as tolerated to bilateral feet  -Continue activity as tolerated  -Return to clinic as needed for new or worsening podiatric concerns    X-ray of the right foot from 8/12/2024 interpreted independently: No shifting of hardware noted, maintained excellent correction of hallux abductovalgus deformity with intermetatarsal angle, as well as sesamoids realigned adequately.  Complete fusion of the first TMT J noted.    SUBJECTIVE    Chief Complaint:  S/p right foot Lapidus bunionectomy with Meir osteotomy date of surgery 12/12/2023      Patient ID: Cherry Apodaca     8/12/2024: Cherry is doing well today.  She states that she is back to exercising with very little pain to her foot.  She does not believe that hardware removal is necessary at this point and is happy with her results.        The following portions of the patient's history were reviewed and updated as appropriate: allergies, current medications, past family history, past medical history, past social history, past surgical history and problem list.    Review of Systems   Constitutional: Negative.    HENT: Negative.     Respiratory: Negative.     Cardiovascular: Negative.    Gastrointestinal: Negative.    Musculoskeletal:  Negative for arthralgias.   Skin: Negative.    Neurological: Negative.       "    OBJECTIVE      /74   Pulse 64   Ht 5' 2\" (1.575 m)   Wt 57.6 kg (127 lb)   BMI 23.23 kg/m²        Physical Exam  Constitutional:       Appearance: Normal appearance.   HENT:      Head: Normocephalic and atraumatic.   Eyes:      General:         Right eye: No discharge.         Left eye: No discharge.   Cardiovascular:      Rate and Rhythm: Normal rate and regular rhythm.      Pulses:           Dorsalis pedis pulses are 2+ on the right side and 2+ on the left side.        Posterior tibial pulses are 2+ on the right side and 2+ on the left side.   Pulmonary:      Effort: Pulmonary effort is normal.      Breath sounds: Normal breath sounds.   Skin:     General: Skin is warm.      Capillary Refill: Capillary refill takes less than 2 seconds.   Neurological:      Sensory: Sensation is intact. No sensory deficit.         MSK:  -No pain on palpation noted today  -No gross deformities noted   -MMT is 5/5 to all muscle compartments of the lower extremity  -Ankle dorsiflexion >10 degrees with knee extended and knee flexed b/l  -Adequate range of motion at the first MTPJ of the right foot noted today.  Much improved compared to the patient's previous visit.     Neuro:  -Light sensation intact bilaterally  -Protective sensation intact bilaterally     Derm:  -No lesions, abrasions, or open wounds noted  -No noted interdigital maceration, peeling, malodor  -No callus formation noted on exam      "

## 2024-08-18 ENCOUNTER — OFFICE VISIT (OUTPATIENT)
Dept: URGENT CARE | Facility: CLINIC | Age: 39
End: 2024-08-18
Payer: COMMERCIAL

## 2024-08-18 VITALS
HEART RATE: 65 BPM | TEMPERATURE: 98 F | SYSTOLIC BLOOD PRESSURE: 122 MMHG | RESPIRATION RATE: 18 BRPM | DIASTOLIC BLOOD PRESSURE: 81 MMHG | OXYGEN SATURATION: 100 %

## 2024-08-18 DIAGNOSIS — B96.89 ACUTE BACTERIAL SINUSITIS: Primary | ICD-10-CM

## 2024-08-18 DIAGNOSIS — J01.90 ACUTE BACTERIAL SINUSITIS: Primary | ICD-10-CM

## 2024-08-18 PROCEDURE — S9083 URGENT CARE CENTER GLOBAL: HCPCS | Performed by: FAMILY MEDICINE

## 2024-08-18 PROCEDURE — G0382 LEV 3 HOSP TYPE B ED VISIT: HCPCS | Performed by: FAMILY MEDICINE

## 2024-08-18 NOTE — PROGRESS NOTES
[facility]        NAME: Cherry Apodaca is a 39 y.o. female  : 1985    MRN: [id number]  DATE: 2024  TIME: 9:29 AM    Assessment and Plan   Acute bacterial sinusitis [J01.90, B96.89]  1. Acute bacterial sinusitis  amoxicillin-clavulanate (AUGMENTIN) 875-125 mg per tablet        Will treat with 5 days of Augmentin.  Patient may consider doing nasal rinse followed by Flonase for additional support.    Patient Instructions     Follow up with PCP in 3-5 days.  Proceed to  ER if symptoms worsen.    If tests have been performed at [facility], our office will contact you with results if changes need to be made to the care plan discussed with you at the visit.  [facility]    Chief Complaint     Chief Complaint   Patient presents with    Sinusitis     Pt reports sinus pain and pressure with drainage mainly on right side x approx 1 week. Tried OTC meds without relief.          History of Present Illness       3-year-old female presents today with right-sided facial pain associated with otalgia which has persisted over the past 1 week despite OTC remedies such as Mucinex and Sudafed.  Recalls doing some home repairs which caused a lot of dust in her home.  Does have environmental allergies which may have triggered her symptoms.    Sinusitis  Associated symptoms include ear pain (Right), headaches and a sore throat (Mild). Pertinent negatives include no chills, congestion, coughing or shortness of breath.       Review of Systems   Review of Systems   Constitutional:  Positive for fatigue. Negative for chills and fever.   HENT:  Positive for ear pain (Right), postnasal drip, rhinorrhea and sore throat (Mild). Negative for congestion.    Respiratory:  Positive for chest tightness. Negative for cough and shortness of breath.    Cardiovascular:  Negative for chest pain.   Gastrointestinal:  Negative for abdominal distention and nausea.   Musculoskeletal:   Positive for myalgias.   Neurological:  Positive for headaches. Negative for dizziness.     Current Medications       Current Outpatient Medications:     amoxicillin-clavulanate (AUGMENTIN) 875-125 mg per tablet, Take 1 tablet by mouth every 12 (twelve) hours for 5 days, Disp: 10 tablet, Rfl: 0    albuterol (PROVENTIL HFA,VENTOLIN HFA) 90 mcg/act inhaler, Inhale 2 puffs every 6 (six) hours as needed for wheezing, Disp: , Rfl:     fexofenadine (ALLEGRA) 180 MG tablet, Take 180 mg by mouth daily, Disp: , Rfl:     ibuprofen (MOTRIN) 600 mg tablet, Take 1 tablet (600 mg total) by mouth every 6 (six) hours as needed for mild pain, Disp: 30 tablet, Rfl: 0    Current Allergies     Allergies as of 08/18/2024 - Reviewed 08/18/2024   Allergen Reaction Noted    Sulfa antibiotics Other (See Comments) 03/10/2016            The following portions of the patient's history were reviewed and updated as appropriate: allergies, current medications, past family history, past medical history, past social history, past surgical history and problem list.     Past Medical History:   Diagnosis Date    Abnormal ECG 3/3/23    Sinus bradycardia, possible left atrial enlargement, borderline ECG    Abnormal uterine bleeding 02/01/2021    Anesthesia complication     LOC post anestrhesia, BP dropped    Anxiety     Anxiety disorder 09/04/2012    Procedure/Onset: 11/19/2007    Asthma     Chronic kidney disease     stones    Miscarriage 2016    Shin splints     Stress fracture     Varicella     childhood       Past Surgical History:   Procedure Laterality Date    BUNIONECTOMY LAPIDIS Right 12/12/2023    Procedure: LAPIDIS BUNIONECTOMY;  Surgeon: Klever Lu DPM;  Location: [facility];  Service: Podiatry    DC CORRJ HLX VLGS BNCTY SESMDC PROX PHLX OSTEOT Right 12/12/2023    Procedure: BUNIONECTOMY ELIDIA;  Surgeon: Klever Lu DPM;  Location: [facility];  Service: Podiatry    DC HYSTEROSCOPY BX ENDOMETRIUM&/POLYPC W/WO D&C N/A 04/11/2023     Procedure: DILATATION AND CURETTAGE (D&C) WITH HYSTEROSCOPY;  Surgeon: Bella Lieberman MD;  Location: [facility];  Service: Gynecology    MD HYSTEROSCOPY ENDOMETRIAL ABLATION N/A 2023    Procedure: ABLATION ENDOMETRIAL DALIA;  Surgeon: Bella Lieberman MD;  Location: [facility];  Service: Gynecology    MD TX MISSED  FIRST TRIMESTER SURGICAL N/A 2016    Procedure: DILATATION AND EVACUATION (D&E);  Surgeon: Halle Philip MD;  Location: [facility];  Service: Gynecology    REPAIR QUADRICEPS / HAMSTRING MUSCLE      SINUS SURGERY         Family History   Problem Relation Age of Onset    Hyperlipidemia Mother     Hyperlipidemia Father     Hypertension Father     Heart disease Father     Melanoma Father     Breast cancer Maternal Grandmother         40s    Cancer Maternal Grandmother     Vision loss Maternal Grandfather     Heart disease Paternal Grandfather     BRCA1 Positive Maternal Aunt     Breast cancer Maternal Aunt     BRCA1 Positive Maternal Aunt     No Known Problems Maternal Aunt     No Known Problems Maternal Aunt     Colon cancer Maternal Uncle 50 - 59    Cancer Maternal Uncle     Cancer Paternal Uncle     Diabetes Paternal Uncle     Throat cancer Paternal Uncle     Lung cancer Paternal Uncle     Diabetes type I Paternal Uncle     Diabetes Paternal Uncle          Medications have been verified.        Objective   /81   Pulse 65   Temp 98 °F (36.7 °C)   Resp 18   SpO2 100%   No LMP recorded. Patient has had an ablation.       Physical Exam     Physical Exam  Vitals and nursing note reviewed.   Constitutional:       General: She is in acute distress.      Appearance: Normal appearance. She is normal weight. She is not ill-appearing, toxic-appearing or diaphoretic.   HENT:      Head: Normocephalic and atraumatic.      Comments: Tenderness over the right frontal and maxillary sinuses     Right Ear: Ear canal and external ear normal. There is no impacted cerumen.      Left  Ear: Tympanic membrane, ear canal and external ear normal. There is no impacted cerumen.      Ears:      Comments: Right TM mildly inflamed with purulence behind the eardrum     Nose: Congestion and rhinorrhea present.      Comments: Inflamed nasal mucosa     Mouth/Throat:      Mouth: Mucous membranes are moist.      Pharynx: No posterior oropharyngeal erythema.   Eyes:      General:         Right eye: No discharge.         Left eye: No discharge.      Conjunctiva/sclera: Conjunctivae normal.   Cardiovascular:      Rate and Rhythm: Normal rate and regular rhythm.   Pulmonary:      Effort: Pulmonary effort is normal. No respiratory distress.      Breath sounds: Normal breath sounds. No wheezing, rhonchi or rales.   Skin:     General: Skin is warm.      Findings: No erythema.   Neurological:      General: No focal deficit present.      Mental Status: She is alert and oriented to person, place, and time.   Psychiatric:         Mood and Affect: Mood normal.         Behavior: Behavior normal.         Thought Content: Thought content normal.         Judgment: Judgment normal.

## 2024-12-13 ENCOUNTER — HOSPITAL ENCOUNTER (OUTPATIENT)
Dept: RADIOLOGY | Facility: HOSPITAL | Age: 39
Discharge: HOME | End: 2024-12-13
Attending: FAMILY MEDICINE
Payer: COMMERCIAL

## 2024-12-13 DIAGNOSIS — Z84.81 FAMILY HISTORY OF BRCA GENE POSITIVE: ICD-10-CM

## 2024-12-13 DIAGNOSIS — Z80.3 FAMILY HISTORY OF BREAST CANCER: ICD-10-CM

## 2024-12-13 DIAGNOSIS — Z12.31 SCREENING MAMMOGRAM FOR BREAST CANCER: ICD-10-CM

## 2024-12-13 PROCEDURE — 77067 SCR MAMMO BI INCL CAD: CPT

## 2024-12-13 PROCEDURE — 77063 BREAST TOMOSYNTHESIS BI: CPT

## 2024-12-22 ENCOUNTER — RESULTS FOLLOW-UP (OUTPATIENT)
Dept: FAMILY MEDICINE CLINIC | Facility: CLINIC | Age: 39
End: 2024-12-22

## 2025-03-13 ENCOUNTER — TELEPHONE (OUTPATIENT)
Dept: DERMATOLOGY | Facility: CLINIC | Age: 40
End: 2025-03-13

## 2025-07-10 ENCOUNTER — OFFICE VISIT (OUTPATIENT)
Dept: FAMILY MEDICINE CLINIC | Facility: CLINIC | Age: 40
End: 2025-07-10
Payer: COMMERCIAL

## 2025-07-10 VITALS
SYSTOLIC BLOOD PRESSURE: 110 MMHG | DIASTOLIC BLOOD PRESSURE: 72 MMHG | HEART RATE: 50 BPM | HEIGHT: 62 IN | BODY MASS INDEX: 23.37 KG/M2 | OXYGEN SATURATION: 100 % | WEIGHT: 127 LBS

## 2025-07-10 DIAGNOSIS — Z13.1 SCREENING FOR DIABETES MELLITUS: ICD-10-CM

## 2025-07-10 DIAGNOSIS — Z84.81 FAMILY HISTORY OF BRCA GENE POSITIVE: ICD-10-CM

## 2025-07-10 DIAGNOSIS — Z13.0 SCREENING FOR DEFICIENCY ANEMIA: ICD-10-CM

## 2025-07-10 DIAGNOSIS — Z12.31 SCREENING MAMMOGRAM FOR BREAST CANCER: ICD-10-CM

## 2025-07-10 DIAGNOSIS — J45.20 MILD INTERMITTENT ASTHMA WITHOUT COMPLICATION: ICD-10-CM

## 2025-07-10 DIAGNOSIS — Z13.220 SCREENING CHOLESTEROL LEVEL: ICD-10-CM

## 2025-07-10 DIAGNOSIS — Z13.29 SCREENING FOR THYROID DISORDER: ICD-10-CM

## 2025-07-10 DIAGNOSIS — Z00.00 ENCOUNTER FOR PREVENTATIVE ADULT HEALTH CARE EXAMINATION: Primary | ICD-10-CM

## 2025-07-10 DIAGNOSIS — Z80.3 FAMILY HISTORY OF BREAST CANCER: ICD-10-CM

## 2025-07-10 PROBLEM — Z98.890 POST-OPERATIVE STATE: Status: RESOLVED | Noted: 2023-12-29 | Resolved: 2025-07-10

## 2025-07-10 PROBLEM — Z98.890 S/P ENDOMETRIAL ABLATION: Status: RESOLVED | Noted: 2023-05-02 | Resolved: 2025-07-10

## 2025-07-10 PROCEDURE — 99396 PREV VISIT EST AGE 40-64: CPT | Performed by: FAMILY MEDICINE

## 2025-07-10 RX ORDER — FLUTICASONE PROPIONATE 50 MCG
2 SPRAY, SUSPENSION (ML) NASAL DAILY
COMMUNITY

## 2025-07-10 NOTE — PROGRESS NOTES
Adult Annual Physical  Name: Cherry Apodaca      : 1985      MRN: [id number]  Encounter Provider: John Bruno DO  Encounter Date: 7/10/2025   Encounter department: [facility]    :  Assessment & Plan  Encounter for preventative adult health care examination  Screening for thyroid disorder  Screening for diabetes mellitus  Screening cholesterol level  Screening for deficiency anemia  Patient is to obtain labs to follow-up for yearly evaluation.  This includes labs reviewing kidney function, liver function, electrolytes, fasting sugar, cholesterol, blood counts, and thyroid function.  Orders:    Comprehensive metabolic panel; Future    Lipid panel; Future    CBC and differential; Future    TSH, 3rd generation with Free T4 reflex; Future    Screening mammogram for breast cancer  Family history of breast cancer  Family history of BRCA gene positive  Patient is not interested in BRCA testing, but has a strong family history of BRCA gene being positive.  There is also a history of early breast cancer.  I recommend regular screenings yearly for the patient to avoid complications and improve early detection of any breast cancer.  Orders:    Mammo screening bilateral w 3d and cad; Future    Mild intermittent asthma without complication  History of intermittent asthma.  No major concerns on presentation for this issue and it is only typically occurs with illness or significant changes in weather.           Preventive Screenings:    - Cervical cancer screening: screening up-to-date   - Breast cancer screening: screening up-to-date     Immunizations:  - Immunizations due: Prevnar 20         History of Present Illness     Adult Annual Physical:  Patient presents for annual physical. Patient presents today for annual physical.  This past week has been fairly stressful with a sick mom at a hospital down [facility] in [place] that is about an hour and 45 minutes away.  This has been fairly  stressful with getting her kids to camps and getting down there and back.  She is advocating for her mom as well so that they take good care of her and keep her where she needs to be.  Outside of this she has been doing fairly well..     Diet and Physical Activity:  - Diet/Nutrition: well balanced diet, limited junk food, consuming 3-5 servings of fruits/vegetables daily, adequate fiber intake and adequate whole grain intake.  - Exercise: vigorous cardiovascular exercise, strength training exercises, 5-7 times a week on average and 30-60 minutes on average.    Depression Screening:  - PHQ-2 Score: 0    General Health:  - Sleep: 4-6 hours of sleep on average.  - Hearing: normal hearing right ear and normal hearing left ear.  - Vision: most recent eye exam > 1 year ago, wears glasses and wears contacts.  - Dental: regular dental visits, brushes teeth twice daily and floss regularly.    /GYN Health:  - Follows with GYN: yes.   - Last menstrual cycle: 6/20/2028.   - History of STDs: no  - Contraception: male partner had vasectomy.      Advanced Care Planning:  - Has an advanced directive?: no    - Has a durable medical POA?: no      Review of Systems   Constitutional:  Negative for chills, fever and unexpected weight change.   HENT:  Negative for congestion, ear discharge, ear pain, hearing loss, postnasal drip, rhinorrhea, sinus pressure, sinus pain and sore throat.    Eyes:  Negative for visual disturbance.   Respiratory:  Negative for cough, chest tightness and shortness of breath.    Cardiovascular:  Negative for chest pain and palpitations.   Gastrointestinal:  Negative for abdominal pain, constipation, diarrhea, nausea and vomiting.   Genitourinary:  Negative for dysuria and frequency.   Neurological:  Negative for dizziness, light-headedness and headaches.   Psychiatric/Behavioral:  Negative for self-injury and suicidal ideas. The patient is nervous/anxious.      Medical History Reviewed by provider this  "encounter:  Allergies  Meds  Problems  Fam Hx     .  Medications Ordered Prior to Encounter[1]   Social History[2]    Objective   /72   Pulse (!) 50   Ht 5' 2\" (1.575 m)   Wt 57.6 kg (127 lb)   LMP 06/20/2028   SpO2 100%   BMI 23.23 kg/m²     Physical Exam  Constitutional:       General: She is not in acute distress.     Appearance: Normal appearance. She is normal weight. She is not ill-appearing, toxic-appearing or diaphoretic.   HENT:      Head: Normocephalic and atraumatic.      Right Ear: Tympanic membrane, ear canal and external ear normal. There is no impacted cerumen.      Left Ear: Tympanic membrane, ear canal and external ear normal. There is no impacted cerumen.      Nose: Nose normal. No congestion or rhinorrhea.      Mouth/Throat:      Mouth: Mucous membranes are moist.      Pharynx: Oropharynx is clear. No oropharyngeal exudate or posterior oropharyngeal erythema.     Eyes:      General:         Right eye: No discharge.         Left eye: No discharge.      Pupils: Pupils are equal, round, and reactive to light.       Cardiovascular:      Rate and Rhythm: Normal rate and regular rhythm.      Heart sounds: Normal heart sounds. No murmur heard.     No friction rub. No gallop.   Pulmonary:      Effort: Pulmonary effort is normal. No respiratory distress.      Breath sounds: Normal breath sounds. No stridor. No wheezing, rhonchi or rales.   Abdominal:      General: There is no distension.      Palpations: Abdomen is soft.      Tenderness: There is no abdominal tenderness.     Musculoskeletal:      Cervical back: Neck supple. No tenderness.   Lymphadenopathy:      Cervical: No cervical adenopathy.     Skin:     General: Skin is warm.      Capillary Refill: Capillary refill takes less than 2 seconds.     Neurological:      Mental Status: She is alert.      Sensory: No sensory deficit (Light touch present all 4 extremities).      Motor: No weakness (5/5 in all 4 extremities).      Deep Tendon " Reflexes: Reflexes normal (2/4, intact, C5, C6, L4, S1).              [1]   Current Outpatient Medications on File Prior to Visit   Medication Sig Dispense Refill    albuterol (PROVENTIL HFA,VENTOLIN HFA) 90 mcg/act inhaler Inhale 2 puffs every 6 (six) hours as needed for wheezing      fexofenadine (ALLEGRA) 180 MG tablet Take 180 mg by mouth in the morning.      fluticasone (FLONASE) 50 mcg/act nasal spray 2 sprays into each nostril daily      ibuprofen (MOTRIN) 600 mg tablet Take 1 tablet (600 mg total) by mouth every 6 (six) hours as needed for mild pain 30 tablet 0     No current facility-administered medications on file prior to visit.   [2]   Social History  Tobacco Use    Smoking status: Never     Passive exposure: Never    Smokeless tobacco: Never   Vaping Use    Vaping status: Never Used   Substance and Sexual Activity    Alcohol use: Yes     Alcohol/week: 3.0 standard drinks of alcohol     Types: 3 Glasses of wine per week     Comment: occasional    Drug use: No    Sexual activity: Yes     Partners: Male     Birth control/protection: Male Sterilization

## 2025-07-10 NOTE — ASSESSMENT & PLAN NOTE
History of intermittent asthma.  No major concerns on presentation for this issue and it is only typically occurs with illness or significant changes in weather.

## (undated) DEVICE — CAST PADDING 4 IN SYNTHETIC NON-STRL

## (undated) DEVICE — PLUMEPEN PRO 10FT

## (undated) DEVICE — Device

## (undated) DEVICE — BETHLEHEM UNIVERSAL  MIONR EXT: Brand: CARDINAL HEALTH

## (undated) DEVICE — INTENDED FOR TISSUE SEPARATION, AND OTHER PROCEDURES THAT REQUIRE A SHARP SURGICAL BLADE TO PUNCTURE OR CUT.: Brand: BARD-PARKER ® CARBON RIB-BACK BLADES

## (undated) DEVICE — GLOVE INDICATOR PI UNDERGLOVE SZ 7.5 BLUE

## (undated) DEVICE — SUT ETHILON 4-0 PS-2 18 IN 1667G

## (undated) DEVICE — CUFF TOURNIQUET 18 X 4 IN QUICK CONNECT DISP 1 BLADDER

## (undated) DEVICE — PRECISION (9.0 X 0.51 X 25.0MM)

## (undated) DEVICE — PREP PAD BNS: Brand: CONVERTORS

## (undated) DEVICE — TAPE CAST 2IN FIBERGLASS 4YD WHITE

## (undated) DEVICE — BLADE SAW 11 X 40MM LAPIPLASTY STRYKER

## (undated) DEVICE — SCD SEQUENTIAL COMPRESSION COMFORT SLEEVE MEDIUM KNEE LENGTH: Brand: KENDALL SCD

## (undated) DEVICE — ACE WRAP 4 IN UNSTERILE

## (undated) DEVICE — SYRINGE 10ML LL

## (undated) DEVICE — DRILL TWIST 2.0 X 95MM

## (undated) DEVICE — GLOVE SRG BIOGEL 7.5

## (undated) DEVICE — SUT VICRYL 3-0 PS-2 27 IN J427H

## (undated) DEVICE — CHLORAPREP HI-LITE 26ML ORANGE

## (undated) DEVICE — CURITY NON-ADHERENT STRIPS: Brand: CURITY

## (undated) DEVICE — WEBRIL 6 IN UNSTERILE

## (undated) DEVICE — NEEDLE 18 G X 1 1/2

## (undated) DEVICE — DRAPE C-ARMOUR

## (undated) DEVICE — NEEDLE 25G X 1 1/2

## (undated) DEVICE — STRETCH BANDAGE: Brand: CURITY

## (undated) DEVICE — SUT VICRYL 4-0 PS-2 27 IN J426H

## (undated) DEVICE — SPONGE GAUZE 4 X 8 12 PLY STRL LF

## (undated) DEVICE — K-WIRE FIXATION 1.6 X 100MM SS
Type: IMPLANTABLE DEVICE | Site: FOOT | Status: NON-FUNCTIONAL
Removed: 2023-12-12

## (undated) DEVICE — SUT ETHILON 4-0 PS-2 18 IN 1667H

## (undated) DEVICE — ASTOUND STANDARD SURGICAL GOWN, XL: Brand: CONVERTORS